# Patient Record
Sex: MALE | Race: OTHER | HISPANIC OR LATINO
[De-identification: names, ages, dates, MRNs, and addresses within clinical notes are randomized per-mention and may not be internally consistent; named-entity substitution may affect disease eponyms.]

---

## 2017-03-07 ENCOUNTER — APPOINTMENT (OUTPATIENT)
Dept: INTERNAL MEDICINE | Facility: CLINIC | Age: 42
End: 2017-03-07

## 2017-03-07 ENCOUNTER — NON-APPOINTMENT (OUTPATIENT)
Age: 42
End: 2017-03-07

## 2017-03-07 VITALS
SYSTOLIC BLOOD PRESSURE: 128 MMHG | HEIGHT: 71 IN | HEART RATE: 104 BPM | WEIGHT: 288 LBS | DIASTOLIC BLOOD PRESSURE: 82 MMHG | BODY MASS INDEX: 40.32 KG/M2 | OXYGEN SATURATION: 98 %

## 2017-03-07 DIAGNOSIS — Z87.891 PERSONAL HISTORY OF NICOTINE DEPENDENCE: ICD-10-CM

## 2017-03-07 DIAGNOSIS — Z78.9 OTHER SPECIFIED HEALTH STATUS: ICD-10-CM

## 2017-03-07 DIAGNOSIS — Z84.1 FAMILY HISTORY OF DISORDERS OF KIDNEY AND URETER: ICD-10-CM

## 2017-03-16 LAB
ALBUMIN SERPL ELPH-MCNC: 4.6 G/DL
ALP BLD-CCNC: 86 U/L
ALT SERPL-CCNC: 26 U/L
ANION GAP SERPL CALC-SCNC: 16 MMOL/L
AST SERPL-CCNC: 22 U/L
BASOPHILS # BLD AUTO: 0.03 K/UL
BASOPHILS NFR BLD AUTO: 0.4 %
BILIRUB SERPL-MCNC: 0.3 MG/DL
BUN SERPL-MCNC: 13 MG/DL
CALCIUM SERPL-MCNC: 9.4 MG/DL
CHLORIDE SERPL-SCNC: 104 MMOL/L
CHOLEST SERPL-MCNC: 271 MG/DL
CHOLEST/HDLC SERPL: 5.9 RATIO
CO2 SERPL-SCNC: 23 MMOL/L
CREAT SERPL-MCNC: 1.21 MG/DL
EOSINOPHIL # BLD AUTO: 0.19 K/UL
EOSINOPHIL NFR BLD AUTO: 2.7 %
GLUCOSE SERPL-MCNC: 104 MG/DL
HBA1C MFR BLD HPLC: 5.5 %
HCT VFR BLD CALC: 48.1 %
HDLC SERPL-MCNC: 46 MG/DL
HGB BLD-MCNC: 16.6 G/DL
HIV1+2 AB SPEC QL IA.RAPID: NONREACTIVE
IMM GRANULOCYTES NFR BLD AUTO: 0.1 %
LDLC SERPL CALC-MCNC: 188 MG/DL
LYMPHOCYTES # BLD AUTO: 3.01 K/UL
LYMPHOCYTES NFR BLD AUTO: 42.6 %
MAN DIFF?: NORMAL
MCHC RBC-ENTMCNC: 29 PG
MCHC RBC-ENTMCNC: 34.5 GM/DL
MCV RBC AUTO: 83.9 FL
MONOCYTES # BLD AUTO: 0.63 K/UL
MONOCYTES NFR BLD AUTO: 8.9 %
NEUTROPHILS # BLD AUTO: 3.2 K/UL
NEUTROPHILS NFR BLD AUTO: 45.3 %
PLATELET # BLD AUTO: 240 K/UL
POTASSIUM SERPL-SCNC: 4.9 MMOL/L
PROT SERPL-MCNC: 7.4 G/DL
RBC # BLD: 5.73 M/UL
RBC # FLD: 13.2 %
SODIUM SERPL-SCNC: 143 MMOL/L
TRIGL SERPL-MCNC: 187 MG/DL
TSH SERPL-ACNC: 2.49 UIU/ML
WBC # FLD AUTO: 7.07 K/UL

## 2017-03-24 ENCOUNTER — OUTPATIENT (OUTPATIENT)
Dept: OUTPATIENT SERVICES | Facility: HOSPITAL | Age: 42
LOS: 1 days | End: 2017-03-24
Payer: COMMERCIAL

## 2017-03-24 ENCOUNTER — APPOINTMENT (OUTPATIENT)
Dept: CV DIAGNOSTICS | Facility: HOSPITAL | Age: 42
End: 2017-03-24

## 2017-03-24 DIAGNOSIS — R07.9 CHEST PAIN, UNSPECIFIED: ICD-10-CM

## 2017-03-24 PROCEDURE — 93017 CV STRESS TEST TRACING ONLY: CPT

## 2017-03-24 PROCEDURE — 93016 CV STRESS TEST SUPVJ ONLY: CPT

## 2017-03-24 PROCEDURE — 93018 CV STRESS TEST I&R ONLY: CPT

## 2019-01-03 ENCOUNTER — APPOINTMENT (OUTPATIENT)
Dept: INTERNAL MEDICINE | Facility: CLINIC | Age: 44
End: 2019-01-03
Payer: COMMERCIAL

## 2019-01-03 ENCOUNTER — FORM ENCOUNTER (OUTPATIENT)
Age: 44
End: 2019-01-03

## 2019-01-03 VITALS
BODY MASS INDEX: 36.26 KG/M2 | SYSTOLIC BLOOD PRESSURE: 130 MMHG | HEART RATE: 79 BPM | OXYGEN SATURATION: 97 % | DIASTOLIC BLOOD PRESSURE: 80 MMHG | TEMPERATURE: 98.8 F | WEIGHT: 259 LBS | HEIGHT: 71 IN

## 2019-01-03 PROCEDURE — 99214 OFFICE O/P EST MOD 30 MIN: CPT

## 2019-01-04 ENCOUNTER — APPOINTMENT (OUTPATIENT)
Dept: ULTRASOUND IMAGING | Facility: CLINIC | Age: 44
End: 2019-01-04
Payer: COMMERCIAL

## 2019-01-04 ENCOUNTER — OTHER (OUTPATIENT)
Age: 44
End: 2019-01-04

## 2019-01-04 ENCOUNTER — OUTPATIENT (OUTPATIENT)
Dept: OUTPATIENT SERVICES | Facility: HOSPITAL | Age: 44
LOS: 1 days | End: 2019-01-04
Payer: COMMERCIAL

## 2019-01-04 DIAGNOSIS — R31.9 HEMATURIA, UNSPECIFIED: ICD-10-CM

## 2019-01-04 LAB
25(OH)D3 SERPL-MCNC: 15.1 NG/ML
ANION GAP SERPL CALC-SCNC: 13 MMOL/L
APPEARANCE: CLEAR
BACTERIA: ABNORMAL
BASOPHILS # BLD AUTO: 0.03 K/UL
BASOPHILS NFR BLD AUTO: 0.4 %
BILIRUBIN URINE: NEGATIVE
BLOOD URINE: ABNORMAL
BUN SERPL-MCNC: 11 MG/DL
C TRACH RRNA SPEC QL NAA+PROBE: NOT DETECTED
CALCIUM SERPL-MCNC: 10.1 MG/DL
CALCIUM SERPL-MCNC: 9.8 MG/DL
CHLORIDE SERPL-SCNC: 105 MMOL/L
CO2 SERPL-SCNC: 23 MMOL/L
COLOR: YELLOW
CREAT SERPL-MCNC: 1.13 MG/DL
EOSINOPHIL # BLD AUTO: 0.14 K/UL
EOSINOPHIL NFR BLD AUTO: 1.8 %
GLUCOSE QUALITATIVE U: NEGATIVE MG/DL
GLUCOSE SERPL-MCNC: 87 MG/DL
HCT VFR BLD CALC: 45.5 %
HGB BLD-MCNC: 16 G/DL
HYALINE CASTS: 2 /LPF
IMM GRANULOCYTES NFR BLD AUTO: 0.5 %
KETONES URINE: NEGATIVE
LEUKOCYTE ESTERASE URINE: ABNORMAL
LYMPHOCYTES # BLD AUTO: 2.6 K/UL
LYMPHOCYTES NFR BLD AUTO: 32.5 %
MAN DIFF?: NORMAL
MCHC RBC-ENTMCNC: 29.2 PG
MCHC RBC-ENTMCNC: 35.2 GM/DL
MCV RBC AUTO: 83 FL
MICROSCOPIC-UA: NORMAL
MONOCYTES # BLD AUTO: 0.63 K/UL
MONOCYTES NFR BLD AUTO: 7.9 %
N GONORRHOEA RRNA SPEC QL NAA+PROBE: NOT DETECTED
NEUTROPHILS # BLD AUTO: 4.56 K/UL
NEUTROPHILS NFR BLD AUTO: 56.9 %
NITRITE URINE: POSITIVE
PARATHYROID HORMONE INTACT: 44 PG/ML
PH URINE: 7
PLATELET # BLD AUTO: 227 K/UL
POTASSIUM SERPL-SCNC: 4.6 MMOL/L
PROTEIN URINE: NEGATIVE MG/DL
RBC # BLD: 5.48 M/UL
RBC # FLD: 13.5 %
RED BLOOD CELLS URINE: 4 /HPF
SODIUM SERPL-SCNC: 141 MMOL/L
SOURCE AMPLIFICATION: NORMAL
SPECIFIC GRAVITY URINE: 1.02
SQUAMOUS EPITHELIAL CELLS: 1 /HPF
UROBILINOGEN URINE: NEGATIVE MG/DL
WBC # FLD AUTO: 8 K/UL
WHITE BLOOD CELLS URINE: 35 /HPF

## 2019-01-04 PROCEDURE — 76770 US EXAM ABDO BACK WALL COMP: CPT | Mod: 26

## 2019-01-04 PROCEDURE — 76770 US EXAM ABDO BACK WALL COMP: CPT

## 2019-01-04 PROCEDURE — 76775 US EXAM ABDO BACK WALL LIM: CPT

## 2019-01-04 PROCEDURE — 76775 US EXAM ABDO BACK WALL LIM: CPT | Mod: 26

## 2019-01-04 NOTE — PHYSICAL EXAM
[No Acute Distress] : no acute distress [Normal Oropharynx] : the oropharynx was normal [Supple] : supple [No Respiratory Distress] : no respiratory distress  [Clear to Auscultation] : lungs were clear to auscultation bilaterally [No Accessory Muscle Use] : no accessory muscle use [Normal Rate] : normal rate  [Regular Rhythm] : with a regular rhythm [Normal S1, S2] : normal S1 and S2 [No Edema] : there was no peripheral edema [Soft] : abdomen soft [Non Tender] : non-tender [No HSM] : no HSM [No CVA Tenderness] : no CVA  tenderness [No Spinal Tenderness] : no spinal tenderness [No Joint Swelling] : no joint swelling [No Rash] : no rash [Normal Gait] : normal gait [Alert and Oriented x3] : oriented to person, place, and time [de-identified] : protuberant [de-identified] : palpable, smooth prostate, brown stool in vault, non tender

## 2019-01-04 NOTE — PLAN
[FreeTextEntry1] : 44 yo man with urinary symptoms and PMH of kidney stones\par \par Treat empiracally for UTI given U/A\par F-up final Cx\par If + will shayy to see Urology\par \par Past Kidney stones\par Sono\par Lytes\par PTH, Vit D\par \par Will return for CPE with PMD

## 2019-01-04 NOTE — HISTORY OF PRESENT ILLNESS
[FreeTextEntry8] : Not seen since 3/2017. Blood in urine, 2 months ago and did not follow-up. For about one week change in urination, change in color, some sediment. No freq, no dysuria. No n/v. Same partner for 7 yrs, woman. Feeling hot but no fever, bodyaches. Mild back pain. Water 50 ounces a day. No pain. Took Aleve and Ibuprofen this week for back pain. \par \par PMH\par obesity\par kidney stones, 10 yrs ago\par 3 children\par \par FH\par sister- kidney stones\par no prostate issues\par \par SH \par \par no recent travel

## 2019-01-07 ENCOUNTER — OTHER (OUTPATIENT)
Age: 44
End: 2019-01-07

## 2019-01-07 LAB — BACTERIA UR CULT: ABNORMAL

## 2019-01-09 ENCOUNTER — APPOINTMENT (OUTPATIENT)
Dept: INTERNAL MEDICINE | Facility: CLINIC | Age: 44
End: 2019-01-09
Payer: COMMERCIAL

## 2019-01-09 VITALS
OXYGEN SATURATION: 98 % | HEIGHT: 71 IN | WEIGHT: 262 LBS | SYSTOLIC BLOOD PRESSURE: 140 MMHG | BODY MASS INDEX: 36.68 KG/M2 | HEART RATE: 75 BPM | DIASTOLIC BLOOD PRESSURE: 85 MMHG

## 2019-01-09 DIAGNOSIS — Z87.898 PERSONAL HISTORY OF OTHER SPECIFIED CONDITIONS: ICD-10-CM

## 2019-01-09 DIAGNOSIS — Z87.448 PERSONAL HISTORY OF OTHER DISEASES OF URINARY SYSTEM: ICD-10-CM

## 2019-01-09 PROCEDURE — 99213 OFFICE O/P EST LOW 20 MIN: CPT

## 2019-01-09 NOTE — ASSESSMENT
[FreeTextEntry1] : \par UTI symptoms resolving, pt feels well with Abx. Advised to complete course and call back if any recurrent symptoms. Return for CPE as he is overdue, repeat labs at next visit. Started on Vit D as well for insufficiency.

## 2019-01-09 NOTE — HISTORY OF PRESENT ILLNESS
[de-identified] : Follow-up of UTI. Saw my colleague last week, found to have Ecoli in UCx. Started on Levaquin and feeling much better now, back to usual self again. Went for renal sono which showed L cyst, otherwise normal. No flank pain or fevers. He reports urine is now back to normal.

## 2019-03-18 ENCOUNTER — APPOINTMENT (OUTPATIENT)
Dept: INTERNAL MEDICINE | Facility: CLINIC | Age: 44
End: 2019-03-18
Payer: COMMERCIAL

## 2019-03-18 PROCEDURE — 99214 OFFICE O/P EST MOD 30 MIN: CPT

## 2019-03-18 NOTE — ASSESSMENT
[FreeTextEntry1] : The patient is a 44-year-old male with history of obesity and vitamin D deficiency who presents with acute back pain\par \par 1.  Acute back pain\par Most likely musculoskeletal\par Continue Advil 2 tabs every 8 hours\par Add Flexeril 10 mg 3 times daily for 1 week 1 to sedation\par If fails to improve call back discussed stretching and ice/warm soaks\par \par #2 obesity\par Counseled on diet and exercise

## 2019-03-18 NOTE — PHYSICAL EXAM
[No Acute Distress] : no acute distress [Well Nourished] : well nourished [Well Developed] : well developed [Well-Appearing] : well-appearing [Normal Voice/Communication] : normal voice/communication [No Respiratory Distress] : no respiratory distress  [Clear to Auscultation] : lungs were clear to auscultation bilaterally [No Accessory Muscle Use] : no accessory muscle use [Normal Rate] : normal rate  [Regular Rhythm] : with a regular rhythm [Normal S1, S2] : normal S1 and S2 [Soft] : abdomen soft [Non Tender] : non-tender [No HSM] : no HSM [No CVA Tenderness] : no CVA  tenderness [No Spinal Tenderness] : no spinal tenderness [de-identified] : Bilateral negative straight leg regular raise, full range of motion of spine without tenderness strength 5 out of 5 in all extremities reflexes intact

## 2019-03-18 NOTE — HISTORY OF PRESENT ILLNESS
[FreeTextEntry8] : \par \par CC: Back pain for 4 days\par \par HPI: The patient is a 44-year-old male with history of obesity and vitamin D deficiency who did heavy lifting 4 days ago and noticed acute onset of severe back pain nonradiating localized to the lumbar section denies weakness, fatigue, rash, changes in bowel or bladder habits

## 2019-03-18 NOTE — REVIEW OF SYSTEMS
[Back Pain] : back pain [Negative] : Neurological [Joint Pain] : no joint pain [Muscle Pain] : no muscle pain [Muscle Weakness] : no muscle weakness [Joint Swelling] : no joint swelling

## 2019-07-09 ENCOUNTER — APPOINTMENT (OUTPATIENT)
Dept: INTERNAL MEDICINE | Facility: CLINIC | Age: 44
End: 2019-07-09
Payer: COMMERCIAL

## 2019-07-09 VITALS
HEART RATE: 90 BPM | HEIGHT: 71 IN | WEIGHT: 253 LBS | SYSTOLIC BLOOD PRESSURE: 110 MMHG | TEMPERATURE: 98.1 F | BODY MASS INDEX: 35.42 KG/M2 | DIASTOLIC BLOOD PRESSURE: 70 MMHG | OXYGEN SATURATION: 98 %

## 2019-07-09 DIAGNOSIS — Z13.39 ENCOUNTER FOR SCREENING EXAM FOR OTHER MENTAL HEALTH AND BEHAVIORAL DISORDERS: ICD-10-CM

## 2019-07-09 DIAGNOSIS — Z13.31 ENCOUNTER FOR SCREENING FOR DEPRESSION: ICD-10-CM

## 2019-07-09 PROCEDURE — 99396 PREV VISIT EST AGE 40-64: CPT

## 2019-07-09 PROCEDURE — G0447 BEHAVIOR COUNSEL OBESITY 15M: CPT

## 2019-07-09 PROCEDURE — G0444 DEPRESSION SCREEN ANNUAL: CPT

## 2019-07-09 PROCEDURE — 99214 OFFICE O/P EST MOD 30 MIN: CPT | Mod: 25

## 2019-07-09 PROCEDURE — G0442 ANNUAL ALCOHOL SCREEN 15 MIN: CPT

## 2019-07-10 PROBLEM — Z13.31 DEPRESSION SCREENING: Status: ACTIVE | Noted: 2019-07-10

## 2019-07-10 PROBLEM — Z13.39 ALCOHOL SCREENING: Status: ACTIVE | Noted: 2019-07-10

## 2019-07-10 RX ORDER — LEVOFLOXACIN 750 MG/1
750 TABLET, FILM COATED ORAL DAILY
Qty: 7 | Refills: 0 | Status: DISCONTINUED | COMMUNITY
Start: 2019-01-04 | End: 2019-07-10

## 2019-07-10 NOTE — PHYSICAL EXAM
[No Acute Distress] : no acute distress [Well Nourished] : well nourished [Well Developed] : well developed [Well-Appearing] : well-appearing [Normal Sclera/Conjunctiva] : normal sclera/conjunctiva [EOMI] : extraocular movements intact [PERRL] : pupils equal round and reactive to light [Normal Outer Ear/Nose] : the outer ears and nose were normal in appearance [Normal Oropharynx] : the oropharynx was normal [Supple] : supple [No Lymphadenopathy] : no lymphadenopathy [No JVD] : no jugular venous distention [No Respiratory Distress] : no respiratory distress  [Thyroid Normal, No Nodules] : the thyroid was normal and there were no nodules present [Clear to Auscultation] : lungs were clear to auscultation bilaterally [Normal Rate] : normal rate  [No Accessory Muscle Use] : no accessory muscle use [Normal S1, S2] : normal S1 and S2 [Regular Rhythm] : with a regular rhythm [No Carotid Bruits] : no carotid bruits [No Abdominal Bruit] : a ~M bruit was not heard ~T in the abdomen [No Murmur] : no murmur heard [No Varicosities] : no varicosities [Pedal Pulses Present] : the pedal pulses are present [No Edema] : there was no peripheral edema [No Palpable Aorta] : no palpable aorta [Soft] : abdomen soft [Non Tender] : non-tender [No Extremity Clubbing/Cyanosis] : no extremity clubbing/cyanosis [Non-distended] : non-distended [No Masses] : no abdominal mass palpated [No HSM] : no HSM [Normal Bowel Sounds] : normal bowel sounds [Normal Posterior Cervical Nodes] : no posterior cervical lymphadenopathy [Testes Mass (___cm)] : there were no testicular masses [Epididymis] : the epididymides were normal [Testes Tenderness] : no tenderness of the testes [No CVA Tenderness] : no CVA  tenderness [Normal Anterior Cervical Nodes] : no anterior cervical lymphadenopathy [No Spinal Tenderness] : no spinal tenderness [No Joint Swelling] : no joint swelling [Grossly Normal Strength/Tone] : grossly normal strength/tone [No Rash] : no rash [Coordination Grossly Intact] : coordination grossly intact [Deep Tendon Reflexes (DTR)] : deep tendon reflexes were 2+ and symmetric [Normal Affect] : the affect was normal [No Focal Deficits] : no focal deficits [Normal Gait] : normal gait [Normal Insight/Judgement] : insight and judgment were intact

## 2019-07-10 NOTE — HISTORY OF PRESENT ILLNESS
[de-identified] : \par Preventive visit: pt is up to date with vaccines; he does not smoke cigarettes and does not misuse alcohol; he feels safe at home and has smoke/CO detectors in the house; he wears seatbelts when in vehicles\par \par Medical Issue 1: Obesity: unstable and worsening with increase in weight; he admits to poor diet and no exercise; he is motivated to lose weight and would like resources to help achieve his weight loss goals; he denies chest pains and shortness of breath\par \par Medical Issue 2: Vitamin D deficiency: unstable and poorly controlled; pt admits to poor compliance with supplementation and is reporting pain in bones and back pain\par \par Medical Issue 3: Acute back pain: new symptom, started 2 months ago while heavy lifting at work; feels pain in cervical spine but without radiation down arms or legs; also denies paresthesias, fevers and chills; taking Tylenol which helps [FreeTextEntry1] : Presents for preventive visit as well as concerns regarding obesity, vitamin D deficiency and acute back pain.

## 2019-07-10 NOTE — HEALTH RISK ASSESSMENT
[] : No [Good] : ~his/her~  mood as  good [No falls in past year] : Patient reported no falls in the past year [No] : No [Language] : denies difficulty with language [Change in mental status noted] : No change in mental status noted [Handling Complex Tasks] : denies difficulty handling complex tasks [Learning/Retaining New Information] : denies difficulty learning/retaining new information [Behavior] : denies difficulty with behavior [Spatial Ability and Orientation] : denies difficulty with spatial ability and orientation [None] : None [Reasoning] : denies difficulty with reasoning [With Family] : lives with family [Employed] : employed [] :  [Sexually Active] : sexually active [Feels Safe at Home] : Feels safe at home [High Risk Behavior] : no high risk behavior [Fully functional (using the telephone, shopping, preparing meals, housekeeping, doing laundry, using] : Fully functional and needs no help or supervision to perform IADLs (using the telephone, shopping, preparing meals, housekeeping, doing laundry, using transportation, managing medications and managing finances) [Fully functional (bathing, dressing, toileting, transferring, walking, feeding)] : Fully functional (bathing, dressing, toileting, transferring, walking, feeding) [Reports normal functional visual acuity (ie: able to read med bottle)] : Reports normal functional visual acuity [Reports changes in hearing] : Reports no changes in hearing [Reports changes in vision] : Reports no changes in vision [Carbon Monoxide Detector] : carbon monoxide detector [Reports changes in dental health] : Reports no changes in dental health [Smoke Detector] : smoke detector [Safety elements used in home] : safety elements used in home [Guns at Home] : no guns at home [Seat Belt] :  uses seat belt [Travel to Developing Areas] : does not  travel to developing areas [Sunscreen] : uses sunscreen [TB Exposure] : is not being exposed to tuberculosis [Caregiver Concerns] : does not have caregiver concerns [Reviewed no changes] : Reviewed no changes [AdvancecareDate] : 07/19

## 2019-07-10 NOTE — ASSESSMENT
[FreeTextEntry1] : \par Preventive visit: pt is up to date with vaccines; praised pt's tobacco-free lifestyle; encouraged use of smoke/CO detectors in the house and use of seatbelts when in vehicles\par \par Medical Issue 1: Obesity counselling: 15 mins, assessed BMI at 30 and above now in obese range; advised weight loss, exercise routine and diet; pt agreed to start exercise program for target weight loss 20 lbs; assisted patient with resources including nutrition referral as needed and arranged for follow-up to monitor weight loss progress over next several months\par \par Medical Issue 2: Vitamin D deficiency: unstable and poorly controlled; pt admits to poor compliance with supplementation and is reporting pain in bones and back pain; check Vit D level via blood draw today\par \par Medical Issue 3: Acute back pain: new symptom, started 2 months ago while heavy lifting at work; suspect MSK etiology, check X-ray to eval for fractures or dislocations\par \par Depression screen performed today, PHQ2=0\par \par Annual alcohol misuse screen, 15 mins, done; negative

## 2019-07-11 ENCOUNTER — FORM ENCOUNTER (OUTPATIENT)
Age: 44
End: 2019-07-11

## 2019-07-12 ENCOUNTER — OUTPATIENT (OUTPATIENT)
Dept: OUTPATIENT SERVICES | Facility: HOSPITAL | Age: 44
LOS: 1 days | End: 2019-07-12
Payer: COMMERCIAL

## 2019-07-12 ENCOUNTER — APPOINTMENT (OUTPATIENT)
Dept: RADIOLOGY | Facility: CLINIC | Age: 44
End: 2019-07-12
Payer: COMMERCIAL

## 2019-07-12 DIAGNOSIS — M54.9 DORSALGIA, UNSPECIFIED: ICD-10-CM

## 2019-07-12 PROCEDURE — 71046 X-RAY EXAM CHEST 2 VIEWS: CPT

## 2019-07-12 PROCEDURE — 71046 X-RAY EXAM CHEST 2 VIEWS: CPT | Mod: 26

## 2019-07-12 PROCEDURE — 72040 X-RAY EXAM NECK SPINE 2-3 VW: CPT | Mod: 26

## 2019-07-12 PROCEDURE — 72040 X-RAY EXAM NECK SPINE 2-3 VW: CPT

## 2019-07-14 LAB
25(OH)D3 SERPL-MCNC: 39.7 NG/ML
ALBUMIN SERPL ELPH-MCNC: 4.9 G/DL
ALP BLD-CCNC: 67 U/L
ALT SERPL-CCNC: 20 U/L
ANION GAP SERPL CALC-SCNC: 18 MMOL/L
AST SERPL-CCNC: 26 U/L
BASOPHILS # BLD AUTO: 0.05 K/UL
BASOPHILS NFR BLD AUTO: 0.6 %
BILIRUB SERPL-MCNC: 0.5 MG/DL
BUN SERPL-MCNC: 14 MG/DL
CALCIUM SERPL-MCNC: 9.8 MG/DL
CHLORIDE SERPL-SCNC: 105 MMOL/L
CHOLEST SERPL-MCNC: 274 MG/DL
CHOLEST/HDLC SERPL: 5.6 RATIO
CO2 SERPL-SCNC: 22 MMOL/L
CREAT SERPL-MCNC: 1.18 MG/DL
EOSINOPHIL # BLD AUTO: 0.06 K/UL
EOSINOPHIL NFR BLD AUTO: 0.7 %
ESTIMATED AVERAGE GLUCOSE: 105 MG/DL
GLUCOSE SERPL-MCNC: 86 MG/DL
HBA1C MFR BLD HPLC: 5.3 %
HCT VFR BLD CALC: 46.5 %
HDLC SERPL-MCNC: 49 MG/DL
HGB BLD-MCNC: 15.6 G/DL
IMM GRANULOCYTES NFR BLD AUTO: 0.1 %
LDLC SERPL CALC-MCNC: 199 MG/DL
LYMPHOCYTES # BLD AUTO: 2.58 K/UL
LYMPHOCYTES NFR BLD AUTO: 31 %
MAN DIFF?: NORMAL
MCHC RBC-ENTMCNC: 29.2 PG
MCHC RBC-ENTMCNC: 33.5 GM/DL
MCV RBC AUTO: 86.9 FL
MONOCYTES # BLD AUTO: 0.54 K/UL
MONOCYTES NFR BLD AUTO: 6.5 %
NEUTROPHILS # BLD AUTO: 5.07 K/UL
NEUTROPHILS NFR BLD AUTO: 61.1 %
PLATELET # BLD AUTO: 245 K/UL
POTASSIUM SERPL-SCNC: 4.8 MMOL/L
PROT SERPL-MCNC: 7.6 G/DL
RBC # BLD: 5.35 M/UL
RBC # FLD: 12.9 %
SODIUM SERPL-SCNC: 145 MMOL/L
TRIGL SERPL-MCNC: 131 MG/DL
TSH SERPL-ACNC: 1.81 UIU/ML
WBC # FLD AUTO: 8.31 K/UL

## 2020-11-23 ENCOUNTER — APPOINTMENT (OUTPATIENT)
Dept: FAMILY MEDICINE | Facility: CLINIC | Age: 45
End: 2020-11-23
Payer: COMMERCIAL

## 2020-11-23 ENCOUNTER — NON-APPOINTMENT (OUTPATIENT)
Age: 45
End: 2020-11-23

## 2020-11-23 VITALS
OXYGEN SATURATION: 97 % | WEIGHT: 267 LBS | BODY MASS INDEX: 37.38 KG/M2 | DIASTOLIC BLOOD PRESSURE: 82 MMHG | HEIGHT: 71 IN | SYSTOLIC BLOOD PRESSURE: 124 MMHG | HEART RATE: 86 BPM | TEMPERATURE: 97.9 F

## 2020-11-23 DIAGNOSIS — Z23 ENCOUNTER FOR IMMUNIZATION: ICD-10-CM

## 2020-11-23 DIAGNOSIS — Z11.59 ENCOUNTER FOR SCREENING FOR OTHER VIRAL DISEASES: ICD-10-CM

## 2020-11-23 DIAGNOSIS — Z11.3 ENCOUNTER FOR SCREENING FOR INFECTIONS WITH A PREDOMINANTLY SEXUAL MODE OF TRANSMISSION: ICD-10-CM

## 2020-11-23 PROCEDURE — 99386 PREV VISIT NEW AGE 40-64: CPT | Mod: 25

## 2020-11-23 PROCEDURE — G0442 ANNUAL ALCOHOL SCREEN 15 MIN: CPT

## 2020-11-23 RX ORDER — ALBUTEROL SULFATE 90 UG/1
108 (90 BASE) AEROSOL, METERED RESPIRATORY (INHALATION) EVERY 6 HOURS
Qty: 1 | Refills: 3 | Status: COMPLETED | COMMUNITY
Start: 2020-04-06 | End: 2020-11-23

## 2020-11-23 RX ORDER — INHALER,ASSIST DEVICE,LG MASK
SPACER (EA) MISCELLANEOUS
Qty: 1 | Refills: 0 | Status: DISCONTINUED | COMMUNITY
Start: 2020-04-06 | End: 2020-11-23

## 2020-11-23 RX ORDER — ERGOCALCIFEROL 1.25 MG/1
1.25 MG CAPSULE ORAL
Qty: 12 | Refills: 1 | Status: COMPLETED | COMMUNITY
Start: 2019-01-04 | End: 2020-11-23

## 2020-11-23 RX ORDER — CYCLOBENZAPRINE HYDROCHLORIDE 10 MG/1
10 TABLET, FILM COATED ORAL 3 TIMES DAILY
Qty: 21 | Refills: 0 | Status: COMPLETED | COMMUNITY
Start: 2019-03-18 | End: 2020-11-23

## 2020-11-23 NOTE — ASSESSMENT
[FreeTextEntry1] : Annual physical: f/u routine labwork\par Screen for STD: f/u labwork\par Low vit D: f/u vit D level\par HLD: encourage diet/wt loss/exercise/low fat diet, f/u lipid panel\par Atypical chest pain: single episode since resolved, constant for 2.5 days unrelated to exertion likely muscular, currently asymptomatic, vital signs stable, no FIELD, EKG shows NSR, will refer to cardio for exercise stress test and possible TTE, advised pt to go to ER if chest pain recurs\par Hx of neck fracture during childhood: pt would like to establish care with orthopedist, will refer to ortho\par RTC 2 weeks

## 2020-11-23 NOTE — COUNSELING
[AUDIT-C Screening administered and reviewed] : AUDIT-C Screening administered and reviewed [Potential consequences of obesity discussed] : Potential consequences of obesity discussed [Benefits of weight loss discussed] : Benefits of weight loss discussed [Encouraged to maintain food diary] : Encouraged to maintain food diary [Encouraged to increase physical activity] : Encouraged to increase physical activity [Encouraged to use exercise tracking device] : Encouraged to use exercise tracking device

## 2020-11-23 NOTE — REVIEW OF SYSTEMS
[Chest Pain] : chest pain [Negative] : Psychiatric [Palpitations] : no palpitations [Claudication] : no  leg claudication [Lower Ext Edema] : no lower extremity edema [Orthopena] : no orthopnea [Paroxysmal Nocturnal Dyspnea] : no paroxysmal nocturnal dyspnea

## 2020-11-23 NOTE — HEALTH RISK ASSESSMENT
[Very Good] : ~his/her~  mood as very good [Yes] : Yes [Monthly or less (1 pt)] : Monthly or less (1 point) [1 or 2 (0 pts)] : 1 or 2 (0 points) [Never (0 pts)] : Never (0 points) [No] : In the past 12 months have you used drugs other than those required for medical reasons? No [No falls in past year] : Patient reported no falls in the past year [0] : 2) Feeling down, depressed, or hopeless: Not at all (0) [HIV Test offered] : HIV Test offered [Hepatitis C test offered] : Hepatitis C test offered [With Family] : lives with family [Employed] : employed [] :  [Sexually Active] : sexually active [Feels Safe at Home] : Feels safe at home [Fully functional (bathing, dressing, toileting, transferring, walking, feeding)] : Fully functional (bathing, dressing, toileting, transferring, walking, feeding) [Fully functional (using the telephone, shopping, preparing meals, housekeeping, doing laundry, using] : Fully functional and needs no help or supervision to perform IADLs (using the telephone, shopping, preparing meals, housekeeping, doing laundry, using transportation, managing medications and managing finances) [] : No [Audit-CScore] : 1 [de-identified] : needs improvement [de-identified] : needs improvement [[id number]] : 0 [High Risk Behavior] : no high risk behavior [Reports changes in hearing] : Reports no changes in hearing [Reports changes in vision] : Reports no changes in vision [Reports changes in dental health] : Reports no changes in dental health

## 2020-11-23 NOTE — HISTORY OF PRESENT ILLNESS
[FreeTextEntry1] : CPE [de-identified] : 46 yo male with pmhx of HLD, low vit D presents for annual physical. pt says that last weekend he had pain in his chest for the first time. It was 7/10 in severity, left side of chest, pressure, nonradiating, constant for 2.5 days and then resolved on its own. nothing made it better or worse. it was not related to exertion. he says it only happened that time. He had episodes of chest pain that were different in 2017 for which he saw a cardiologist and had a negative stress test. Denies fever, chills, cp, palpitations, sob, n/v, heat/cold intolerance, dizziness, diaphoresis, or calf pain.

## 2020-12-03 LAB
25(OH)D3 SERPL-MCNC: 28.6 NG/ML
ALBUMIN SERPL ELPH-MCNC: 4.8 G/DL
ALP BLD-CCNC: 87 U/L
ALT SERPL-CCNC: 22 U/L
ANION GAP SERPL CALC-SCNC: 12 MMOL/L
APPEARANCE: CLEAR
AST SERPL-CCNC: 25 U/L
BACTERIA: ABNORMAL
BASOPHILS # BLD AUTO: 0.05 K/UL
BASOPHILS NFR BLD AUTO: 0.6 %
BILIRUB SERPL-MCNC: 0.5 MG/DL
BILIRUBIN URINE: NEGATIVE
BLOOD URINE: ABNORMAL
BUN SERPL-MCNC: 16 MG/DL
C TRACH RRNA SPEC QL NAA+PROBE: NOT DETECTED
CALCIUM SERPL-MCNC: 9.9 MG/DL
CHLORIDE SERPL-SCNC: 103 MMOL/L
CHOLEST SERPL-MCNC: 302 MG/DL
CO2 SERPL-SCNC: 24 MMOL/L
COLOR: YELLOW
CREAT SERPL-MCNC: 1.06 MG/DL
EOSINOPHIL # BLD AUTO: 0.09 K/UL
EOSINOPHIL NFR BLD AUTO: 1 %
ESTIMATED AVERAGE GLUCOSE: 111 MG/DL
GLUCOSE QUALITATIVE U: NEGATIVE
GLUCOSE SERPL-MCNC: 82 MG/DL
HBA1C MFR BLD HPLC: 5.5 %
HBV CORE IGG+IGM SER QL: REACTIVE
HBV CORE IGM SER QL: NONREACTIVE
HBV SURFACE AB SER QL: REACTIVE
HBV SURFACE AG SER QL: NONREACTIVE
HCT VFR BLD CALC: 47.6 %
HCV AB SER QL: NONREACTIVE
HCV S/CO RATIO: 0.13 S/CO
HDLC SERPL-MCNC: 48 MG/DL
HGB BLD-MCNC: 16 G/DL
HIV1+2 AB SPEC QL IA.RAPID: NONREACTIVE
HYALINE CASTS: 3 /LPF
IMM GRANULOCYTES NFR BLD AUTO: 0.2 %
KETONES URINE: NEGATIVE
LDLC SERPL CALC-MCNC: 203 MG/DL
LEUKOCYTE ESTERASE URINE: ABNORMAL
LYMPHOCYTES # BLD AUTO: 3.15 K/UL
LYMPHOCYTES NFR BLD AUTO: 36.2 %
MAN DIFF?: NORMAL
MCHC RBC-ENTMCNC: 29.3 PG
MCHC RBC-ENTMCNC: 33.6 GM/DL
MCV RBC AUTO: 87 FL
MICROSCOPIC-UA: NORMAL
MONOCYTES # BLD AUTO: 0.46 K/UL
MONOCYTES NFR BLD AUTO: 5.3 %
N GONORRHOEA RRNA SPEC QL NAA+PROBE: NOT DETECTED
NEUTROPHILS # BLD AUTO: 4.94 K/UL
NEUTROPHILS NFR BLD AUTO: 56.7 %
NITRITE URINE: POSITIVE
NONHDLC SERPL-MCNC: 254 MG/DL
PH URINE: 6
PLATELET # BLD AUTO: 251 K/UL
POTASSIUM SERPL-SCNC: 4.6 MMOL/L
PROT SERPL-MCNC: 7.4 G/DL
PROTEIN URINE: NORMAL
RBC # BLD: 5.47 M/UL
RBC # FLD: 13 %
RED BLOOD CELLS URINE: 1 /HPF
SARS-COV-2 IGG SERPL IA-ACNC: 0.09 INDEX
SARS-COV-2 IGG SERPL QL IA: NEGATIVE
SODIUM SERPL-SCNC: 139 MMOL/L
SOURCE AMPLIFICATION: NORMAL
SPECIFIC GRAVITY URINE: 1.03
SQUAMOUS EPITHELIAL CELLS: 1 /HPF
T PALLIDUM AB SER QL IA: NEGATIVE
TRIGL SERPL-MCNC: 254 MG/DL
TSH SERPL-ACNC: 2 UIU/ML
UROBILINOGEN URINE: NORMAL
WBC # FLD AUTO: 8.71 K/UL
WHITE BLOOD CELLS URINE: 39 /HPF

## 2020-12-08 ENCOUNTER — APPOINTMENT (OUTPATIENT)
Dept: CARDIOLOGY | Facility: CLINIC | Age: 45
End: 2020-12-08
Payer: COMMERCIAL

## 2020-12-08 VITALS
OXYGEN SATURATION: 96 % | HEIGHT: 71 IN | SYSTOLIC BLOOD PRESSURE: 130 MMHG | DIASTOLIC BLOOD PRESSURE: 70 MMHG | BODY MASS INDEX: 37.38 KG/M2 | WEIGHT: 267 LBS | HEART RATE: 68 BPM

## 2020-12-08 PROCEDURE — 99072 ADDL SUPL MATRL&STAF TM PHE: CPT

## 2020-12-08 PROCEDURE — 99244 OFF/OP CNSLTJ NEW/EST MOD 40: CPT

## 2020-12-15 ENCOUNTER — APPOINTMENT (OUTPATIENT)
Dept: FAMILY MEDICINE | Facility: CLINIC | Age: 45
End: 2020-12-15
Payer: COMMERCIAL

## 2020-12-15 VITALS
OXYGEN SATURATION: 98 % | DIASTOLIC BLOOD PRESSURE: 78 MMHG | HEIGHT: 71 IN | BODY MASS INDEX: 37.24 KG/M2 | HEART RATE: 65 BPM | WEIGHT: 266 LBS | TEMPERATURE: 97.8 F | SYSTOLIC BLOOD PRESSURE: 122 MMHG

## 2020-12-15 PROCEDURE — 81003 URINALYSIS AUTO W/O SCOPE: CPT | Mod: QW

## 2020-12-15 PROCEDURE — 99213 OFFICE O/P EST LOW 20 MIN: CPT

## 2020-12-15 PROCEDURE — 99072 ADDL SUPL MATRL&STAF TM PHE: CPT

## 2020-12-15 NOTE — HISTORY OF PRESENT ILLNESS
[FreeTextEntry1] : follow up on hematuria  [de-identified] : 44 yo male presents for follow up of hematuria. pt reports that since that visit he has noticed strange smell to his urine, increased frequency. It is associated with mild right flank discomfort x 1 week. He has a history of kidney stones however says this discomfort feels different. Denies fever, chills, cp, palpitations, sob, n/v, heat/cold intolerance, gross hematuria, melena, hematochezia or calf pain.

## 2020-12-15 NOTE — ASSESSMENT
[FreeTextEntry1] : Acute cystitis with hematuria: no cva tenderness, no fever/chills, appears well, f/u UA/UCx, start levaquin 750 mg po qdaily x 7 days\par RTC 2 weeks

## 2020-12-15 NOTE — DISCUSSION/SUMMARY
[FreeTextEntry1] : Pt is a 44 y/o M PMH HLD, BMI 37 with CP, SOB\par Will check transthoracic echocardiogram to evaluate left ventricular function and assess for any structural abnormalities\par check CCTA to eval for CAD\par Advised pt to go to the nearest ED if symptoms persist or worsen\par Pt will return in 4 mnths or sooner as needed but I encouraged communication via phone or portal if necessary. \par The described plan was discussed with the pt.  All questions and concerns were addressed to the best of my knowledge.

## 2020-12-15 NOTE — HISTORY OF PRESENT ILLNESS
[FreeTextEntry1] : Pt is a 44 y/o M who is referred here today by their PCP for evaluation.  Pt has PMH HLD, BMI 37.  About 2.5 weeks ago he started having CP radiating to LUE which lasted about 1.5 days.  Since then he has had minor episodes but not as strong as the first time.   At times he gets SOB along with palpitations.  He denies diaphoresis, dizziness, syncope, LE edema, PND, orthopnea. \par \par PMH: HLD, BMI 37\par Smoking status: never\par social ETOH\par no drug use\par Current exercise: none\par Daily water intake:\par Daily caffeine intake:\par OTC medications: \par Family hx: parents HTN/HLD/DM\par Previous cardiac testing:\par Previous hospitalizations:\par

## 2020-12-17 ENCOUNTER — OUTPATIENT (OUTPATIENT)
Dept: OUTPATIENT SERVICES | Facility: HOSPITAL | Age: 45
LOS: 1 days | End: 2020-12-17
Payer: COMMERCIAL

## 2020-12-17 ENCOUNTER — APPOINTMENT (OUTPATIENT)
Dept: ULTRASOUND IMAGING | Facility: CLINIC | Age: 45
End: 2020-12-17
Payer: COMMERCIAL

## 2020-12-17 DIAGNOSIS — N30.01 ACUTE CYSTITIS WITH HEMATURIA: ICD-10-CM

## 2020-12-17 PROCEDURE — 76775 US EXAM ABDO BACK WALL LIM: CPT

## 2020-12-17 PROCEDURE — 76775 US EXAM ABDO BACK WALL LIM: CPT | Mod: 26

## 2020-12-20 LAB
APPEARANCE: CLEAR
BACTERIA UR CULT: ABNORMAL
BACTERIA: ABNORMAL
BILIRUBIN URINE: NEGATIVE
BLOOD URINE: NEGATIVE
COLOR: YELLOW
GLUCOSE QUALITATIVE U: NEGATIVE
HYALINE CASTS: 0 /LPF
KETONES URINE: NEGATIVE
LEUKOCYTE ESTERASE URINE: NEGATIVE
MICROSCOPIC-UA: NORMAL
NITRITE URINE: NEGATIVE
PH URINE: 5.5
PROTEIN URINE: NORMAL
RED BLOOD CELLS URINE: 0 /HPF
SPECIFIC GRAVITY URINE: 1.02
SQUAMOUS EPITHELIAL CELLS: 0 /HPF
UROBILINOGEN URINE: NORMAL
WHITE BLOOD CELLS URINE: 3 /HPF

## 2021-01-05 ENCOUNTER — APPOINTMENT (OUTPATIENT)
Dept: ORTHOPEDIC SURGERY | Facility: CLINIC | Age: 46
End: 2021-01-05
Payer: COMMERCIAL

## 2021-01-05 VITALS
HEIGHT: 71 IN | HEART RATE: 64 BPM | DIASTOLIC BLOOD PRESSURE: 93 MMHG | SYSTOLIC BLOOD PRESSURE: 137 MMHG | BODY MASS INDEX: 37.1 KG/M2 | WEIGHT: 265 LBS

## 2021-01-05 PROCEDURE — 99205 OFFICE O/P NEW HI 60 MIN: CPT

## 2021-01-05 PROCEDURE — 99072 ADDL SUPL MATRL&STAF TM PHE: CPT

## 2021-01-05 PROCEDURE — 72040 X-RAY EXAM NECK SPINE 2-3 VW: CPT

## 2021-01-05 NOTE — DISCUSSION/SUMMARY
[de-identified] : A cervical MRI has been ordered and is medically necessary due to persistent pain, LE hyperreflexia, LUE radiculopathy, history of an incomplete spinal cord injury, and to further assess if there is cervical myelopathy. MRI will help guide treatment plan, possible surgical intervention vs injection therapy with pain management. The patient will follow up after the MRI results have been obtained. Flexion-Extension images at next clinical visit.\par \par Patient with multiple medical comorbidity increasing the risk of perioperative and postoperative complications as well as diminished spine outcomes as per the current medical literature. These include but are not limited to obesity, anxiety/depression, cardiac illness, kidney disease, peripheral vascular disease, history of cancer, COPD, dysmetabolic syndrome including but not limited to diabetes, hyperlipidemia, hypertension. Patient is being referred back to primary care provider for medical optimization, as well as other appropriate specialists as needed for optimization prior to spine surgery. \par Based upon chronic incomplete spinal cord injury and suspected chronic cervical myelopathy at this patient be a complex patient with regard to recovery.

## 2021-01-05 NOTE — PHYSICAL EXAM
[Obese] : obese [Poor Appearance] : well-appearing [Acute Distress] : not in acute distress [de-identified] : CONSTITUTIONAL: Patient is a very pleasant individual who is well-nourished and appears stated age. \par PSYCHIATRIC: Alert and oriented times three and in no apparent distress, and participates with orthopedic evaluation well.\par HEAD: Atraumatic and nonsyndromic in appearance.\par EENT: No thyromegaly, EOMI.\par RESPIRATORY: Respiratory rate is regular, not dyspneic on examination.\par LYMPHATICS: There is no cervical or axillary lymphadenopathy.\par INTEGUMENTARY: Skin is clean, dry, and intact about the bilateral upper extremities and cervical spine. \par VASCULAR: There is brisk capillary refill about the bilateral upper extremities and radial pulses are 2/4. \par NEUROLOGIC: Positive L'hirmitte, negative Spurling’s sign. Deep tendon reflexes are well-maintained at +2/4 of the bilateral upper extremities and are symmetric. Radiating pain and paresthesia to the LUE, profound LE hyperreflexia and two beats sustained clonus of the BL LE.\par MUSCULOSKELETAL: There is no visible muscular atrophy. The patient ambulates in a non-myelopathic manner. Normal secondary orthopaedic exam of bilateral shoulders, elbows and hands. Elbow flexion and extension, wrist extension, finger flexion and abduction are well maintained.  [de-identified] : Xray of a cervical spine taken 01/05/2021  demonstrates well maintained cervical lordosis with a auto-fusion between C6-C7.  \par

## 2021-01-05 NOTE — ADDENDUM
[FreeTextEntry1] : Documented by Kam Ybarra acting as a scribe for Dr. Rich De La Torre on 01/05/2021. All medical record entries made by the Scribe were at my, Dr. Rich De La Torre, direction and personally dictated by me on 01/05/2021 . I have reviewed the chart and agree that the record accurately reflects my personal performance of the history, physical exam, assessment and plan. I have also personally directed, reviewed, and agreed with the chart.

## 2021-01-05 NOTE — HISTORY OF PRESENT ILLNESS
[de-identified] : 45 year old M presents for an initial evaluation of worsening paresthesia in his LUE  that gets worse when he lays down. He has complaints of  weakness in his left hand as well.  He does not complain of balance issues. JACKLYN questionnaire positive due to this LUE  weakness.  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 This all occurred in the DR. \par \par  [Ataxia] : no ataxia [Incontinence] : no incontinence [Loss of Dexterity] : good dexterity [Urinary Ret.] : no urinary retention

## 2021-01-15 ENCOUNTER — APPOINTMENT (OUTPATIENT)
Dept: MRI IMAGING | Facility: CLINIC | Age: 46
End: 2021-01-15
Payer: COMMERCIAL

## 2021-01-15 ENCOUNTER — OUTPATIENT (OUTPATIENT)
Dept: OUTPATIENT SERVICES | Facility: HOSPITAL | Age: 46
LOS: 1 days | End: 2021-01-15
Payer: COMMERCIAL

## 2021-01-15 DIAGNOSIS — Z00.8 ENCOUNTER FOR OTHER GENERAL EXAMINATION: ICD-10-CM

## 2021-01-15 DIAGNOSIS — R29.2 ABNORMAL REFLEX: ICD-10-CM

## 2021-01-15 PROCEDURE — 72141 MRI NECK SPINE W/O DYE: CPT | Mod: 26

## 2021-01-15 PROCEDURE — 72141 MRI NECK SPINE W/O DYE: CPT

## 2021-01-28 ENCOUNTER — APPOINTMENT (OUTPATIENT)
Dept: ORTHOPEDIC SURGERY | Facility: CLINIC | Age: 46
End: 2021-01-28
Payer: COMMERCIAL

## 2021-01-28 VITALS
DIASTOLIC BLOOD PRESSURE: 82 MMHG | BODY MASS INDEX: 37.1 KG/M2 | HEIGHT: 71 IN | WEIGHT: 265 LBS | HEART RATE: 74 BPM | SYSTOLIC BLOOD PRESSURE: 141 MMHG

## 2021-01-28 DIAGNOSIS — R29.2 ABNORMAL REFLEX: ICD-10-CM

## 2021-01-28 DIAGNOSIS — Z87.440 PERSONAL HISTORY OF URINARY (TRACT) INFECTIONS: ICD-10-CM

## 2021-01-28 DIAGNOSIS — Z87.448 PERSONAL HISTORY OF OTHER DISEASES OF URINARY SYSTEM: ICD-10-CM

## 2021-01-28 DIAGNOSIS — Z83.3 FAMILY HISTORY OF DIABETES MELLITUS: ICD-10-CM

## 2021-01-28 PROCEDURE — 99072 ADDL SUPL MATRL&STAF TM PHE: CPT

## 2021-01-28 PROCEDURE — 99215 OFFICE O/P EST HI 40 MIN: CPT

## 2021-02-03 ENCOUNTER — APPOINTMENT (OUTPATIENT)
Dept: CARDIOLOGY | Facility: CLINIC | Age: 46
End: 2021-02-03
Payer: COMMERCIAL

## 2021-02-03 PROCEDURE — 99072 ADDL SUPL MATRL&STAF TM PHE: CPT

## 2021-02-03 PROCEDURE — 93306 TTE W/DOPPLER COMPLETE: CPT

## 2021-02-05 ENCOUNTER — APPOINTMENT (OUTPATIENT)
Dept: CT IMAGING | Facility: CLINIC | Age: 46
End: 2021-02-05
Payer: COMMERCIAL

## 2021-02-05 ENCOUNTER — NON-APPOINTMENT (OUTPATIENT)
Age: 46
End: 2021-02-05

## 2021-02-05 ENCOUNTER — OUTPATIENT (OUTPATIENT)
Dept: OUTPATIENT SERVICES | Facility: HOSPITAL | Age: 46
LOS: 1 days | End: 2021-02-05
Payer: COMMERCIAL

## 2021-02-05 DIAGNOSIS — Z00.8 ENCOUNTER FOR OTHER GENERAL EXAMINATION: ICD-10-CM

## 2021-02-05 DIAGNOSIS — R29.2 ABNORMAL REFLEX: ICD-10-CM

## 2021-02-05 PROCEDURE — 72125 CT NECK SPINE W/O DYE: CPT

## 2021-02-05 PROCEDURE — 72125 CT NECK SPINE W/O DYE: CPT | Mod: 26

## 2021-02-05 NOTE — HISTORY OF PRESENT ILLNESS
[de-identified] : 46 year old M Presents for follow up evaluation of  worsening paresthesia in his LUE  that gets worse when he lays down. He has complaints of  weakness in his left hand as well. JACKLYN questionnaire positive due to this LUE  weakness. 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 This all occurred in the DR. His gait and strength continue to decline. Currently gainfully employed as a . \par \par  [Ataxia] : no ataxia [Incontinence] : no incontinence [Loss of Dexterity] : good dexterity [Urinary Ret.] : no urinary retention

## 2021-02-05 NOTE — ADDENDUM
[FreeTextEntry1] : Documented by Kam Ybarra acting as a scribe for Dr. Rich De La Torre on 01/28/2021. All medical record entries made by the Scribe were at my, Dr. Rich De La Torre, direction and personally dictated by me on 01/28/2021 . I have reviewed the chart and agree that the record accurately reflects my personal performance of the history, physical exam, assessment and plan. I have also personally directed, reviewed, and agreed with the chart.

## 2021-02-05 NOTE — PHYSICAL EXAM
[Obese] : obese [Poor Appearance] : well-appearing [Acute Distress] : not in acute distress [de-identified] : CONSTITUTIONAL: Patient is a very pleasant individual who is well-nourished and appears stated age. \par PSYCHIATRIC: Alert and oriented times three and in no apparent distress, and participates with orthopedic evaluation well.\par HEAD: Atraumatic and nonsyndromic in appearance.\par EENT: No thyromegaly, EOMI.\par RESPIRATORY: Respiratory rate is regular, not dyspneic on examination.\par LYMPHATICS: There is no cervical or axillary lymphadenopathy.\par INTEGUMENTARY: Skin is clean, dry, and intact about the bilateral upper extremities and cervical spine. \par VASCULAR: There is brisk capillary refill about the bilateral upper extremities and radial pulses are 2/4. \par NEUROLOGIC: Positive L'hirmitte, negative Spurling’s sign. Positive Lan's and Moris signs, with cervical flexion his neurological signs decline, extension improves these complaints.  Deep tendon reflexes are well-maintained at +2/4 of the bilateral upper extremities and are symmetric. Radiating pain and paresthesia to the LUE, profound LE hyperreflexia and two beats sustained clonus of the BL LE.\par MUSCULOSKELETAL: There is no visible muscular atrophy. The patient ambulates in a non-myelopathic manner. Normal secondary orthopaedic exam of bilateral shoulders, elbows and hands. Elbow flexion and extension, wrist extension, finger flexion and abduction are well maintained.  [de-identified] : Xray of a cervical spine taken 01/05/2021  demonstrates well maintained cervical lordosis with a auto-fusion between C6-C7. \par \par MRI of the cervical spine taken at [facility] on 01/15/2021 demonstrates chronic bone deformity at C6 and C7 there is also cord atrophy posteriorly and significant impingement at the C6-C7 junction. \par

## 2021-02-05 NOTE — DISCUSSION/SUMMARY
[de-identified] : A CT scan has been ordered and is medically necessary due to worsening myelopathy, failure of conservative measures, and to assess the size of screws required for surgical intervention. CT scan will help guide treatment plan, possible surgical intervention vs injection therapy with pain management. The patient will follow up after the CT scan results have been obtained. \par \par We clearly discussed disability possibility as he is a  in [facility], due his progressive cervical myelopathy and this surgery being a large reconstruct with a chance of neurological issues, paralysis and inability to return to work due to pain have been discussed. \par \par A long discussion was had with the patient regarding Cervical surgical plan of a posterior and anterior cervical fusion and 2 level corpectomy at C6 and C7 as well as placement of corpectomy cage at C5-T1, posterior cervical laminectomy and fusion C4-T2.  Anatomic models, Xrays, CT scans/MRI’s were utilized to provide a firm understanding of their surgical plan. Patient is aware that surgery is elective in nature and he choosing to proceed with surgery. Risks, benefits, alternatives were discussed and all questions, comments and concerns were encouraged and answered to the patient's satisfaction. The statistical probability of improvement was discussed at length as well as post surgical course. Literature from [employer] society was provided to the patient regarding the specific type of surgery as well as a 5 page written surgical consent which the patient will need to sign and return to the office prior to surgical date. Consent forms highlight specific complications related to the complex nature of spinal surgery.\par  \par Risks of cervical surgery include: dysphagia/difficulty swallowing, Dysphonia/altered voice, adjacent segment disease (which will require more surgery in the future), vascular compromise and stroke, and persistent pain.\par  \par Benefits of cervical surgery include Improved neurologic function and pain score\par  \par We also discussed with the patient complications of incisions directly related to obesity, diabetes, previous wound complications or post-surgical wound infections, smoking, neuropathy, and chronic anticoagulation. This risk has been specifically discussed and the patient will discuss modifiable risk factors to be optimized prior to surgical management. A multimodality approach of primary care physician, and medicine subspecialists will be utilized to optimize medical risk factors.\par  \par If patient is a smoker, discontinuation of smoking was advised and must be accomplished 6-8 weeks prior to surgery date. Patient was advised that help with quitting smoking is available through [facility] Smoker's Quit Line and phone number/website was provided, or patient can ask assistance from primary care provider. Elective surgery will not be performed unless patient complies with this policy. \par \par ADDENDUM 2/5/21 I spoke to patient who's insurance company is denying his decompressive surgery for myelopathy, left upper extremity and lower extremity weakness. We will be doing a peer to peer asap and have let patient know if symptoms progress whatsoever he should go to ED.  I have ordered light physical therapy for upper extremity strengthening, avoid cervical extension,per insurance guidelines which will most certainly not correct spinal cord injury/myelopathy.

## 2021-02-09 ENCOUNTER — APPOINTMENT (OUTPATIENT)
Dept: ORTHOPEDIC SURGERY | Facility: CLINIC | Age: 46
End: 2021-02-09
Payer: COMMERCIAL

## 2021-02-09 VITALS
HEART RATE: 77 BPM | HEIGHT: 71 IN | WEIGHT: 265 LBS | DIASTOLIC BLOOD PRESSURE: 82 MMHG | SYSTOLIC BLOOD PRESSURE: 138 MMHG | BODY MASS INDEX: 37.1 KG/M2

## 2021-02-09 DIAGNOSIS — Z82.49 FAMILY HISTORY OF ISCHEMIC HEART DISEASE AND OTHER DISEASES OF THE CIRCULATORY SYSTEM: ICD-10-CM

## 2021-02-09 PROCEDURE — 99072 ADDL SUPL MATRL&STAF TM PHE: CPT

## 2021-02-09 PROCEDURE — 99215 OFFICE O/P EST HI 40 MIN: CPT

## 2021-02-09 NOTE — ADDENDUM
[FreeTextEntry1] : Documented by Kam Ybarra acting as a scribe for Dr. Rich De La Torre on 02/09/2021. All medical record entries made by the Scribe were at my, Dr. Rich De La Torre, direction and personally dictated by me on 02/09/2021 . I have reviewed the chart and agree that the record accurately reflects my personal performance of the history, physical exam, assessment and plan. I have also personally directed, reviewed, and agreed with the chart.

## 2021-02-09 NOTE — DISCUSSION/SUMMARY
[de-identified] : Patient with multiple medical comorbidity increasing the risk of perioperative and postoperative complications as well as diminished spine outcomes as per the current medical literature.  These include but are not limited to obesity, anxiety/depression, cardiac illness, kidney disease, peripheral vascular disease, history of cancer, COPD, dysmetabolic syndrome including but not limited to diabetes, hyperlipidemia, hypertension.  Patient is being referred back to primary care provider for medical optimization, as well as other appropriate specialists as needed for optimization prior to spine surgery. \par \par Patient is also considered complex due to an incomplete spinal cord injury. Patient is aware of his chronic spinal cord injury prior to reconstruction and that we can not reverse the damage, he is aware this surgery is to attempt to persevere the integrity of his spinal cord after being draped over a kyphotic 35 ° angle. \par \par We clearly discussed disability possibility as he is a  in [facility], due his progressive cervical myelopathy and this surgery being a large reconstruct with a chance of neurological issues, paralysis and inability to return to work due to pain have been discussed. 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 \par \par A long discussion was had with the patient regarding Cervical surgical plan of a posterior and anterior cervical fusion and 2 level corpectomy at C6 and C7 as well as placement of corpectomy cage at C5-T1, posterior cervical laminectomy and fusion C4-T2.  Anatomic models, Xrays, CT scans/MRI’s were utilized to provide a firm understanding of their surgical plan. Patient is aware that surgery is elective in nature and he choosing to proceed with surgery. Risks, benefits, alternatives were discussed and all questions, comments and concerns were encouraged and answered to the patient's satisfaction. The statistical probability of improvement was discussed at length as well as post surgical course. Literature from [employer] was provided to the patient regarding the specific type of surgery as well as a 5 page written surgical consent which the patient will need to sign and return to the office prior to surgical date. Consent forms highlight specific complications related to the complex nature of spinal surgery.\par  \par Risks of cervical surgery include: dysphagia/difficulty swallowing, Dysphonia/altered voice, adjacent segment disease (which will require more surgery in the future), vascular compromise and stroke, and persistent pain.\par  \par Benefits of cervical surgery include Improved neurologic function and pain score\par  \par We also discussed with the patient complications of incisions directly related to obesity, diabetes, previous wound complications or post-surgical wound infections, smoking, neuropathy, and chronic anticoagulation. This risk has been specifically discussed and the patient will discuss modifiable risk factors to be optimized prior to surgical management. A multimodality approach of primary care physician, and medicine subspecialists will be utilized to optimize medical risk factors.\par  \par If patient is a smoker, discontinuation of smoking was advised and must be accomplished 6-8 weeks prior to surgery date. Patient was advised that help with quitting smoking is available through [facility] Smoker's Quit Line and phone number/website was provided, or patient can ask assistance from primary care provider. Elective surgery will not be performed unless patient complies with this policy. \par \par ADDENDUM 2/5/21 I spoke to patient who's insurance company is denying his decompressive surgery for myelopathy, left upper extremity and lower extremity weakness. We will be doing a peer to peer asap and have let patient know if symptoms progress whatsoever he should go to ED.  I have ordered light physical therapy for upper extremity strengthening, avoid cervical extension,per insurance guidelines which will most certainly not correct spinal cord injury/myelopathy.

## 2021-02-09 NOTE — PHYSICAL EXAM
[Obese] : obese [Poor Appearance] : well-appearing [Acute Distress] : not in acute distress [de-identified] : CONSTITUTIONAL: Patient is a very pleasant individual who is well-nourished and appears stated age. \par PSYCHIATRIC: Alert and oriented times three and in no apparent distress, and participates with orthopedic evaluation well.\par HEAD: Atraumatic and nonsyndromic in appearance.\par EENT: No thyromegaly, EOMI.\par RESPIRATORY: Respiratory rate is regular, not dyspneic on examination.\par LYMPHATICS: There is no cervical or axillary lymphadenopathy.\par INTEGUMENTARY: Skin is clean, dry, and intact about the bilateral upper extremities and cervical spine. \par VASCULAR: There is brisk capillary refill about the bilateral upper extremities and radial pulses are 2/4. \par NEUROLOGIC: Positive L'hirmitte, negative Spurling’s sign. Positive Lan's and Moris signs, with cervical flexion his neurological signs decline, extension improves these complaints.  Deep tendon reflexes are well-maintained at +2/4 of the bilateral upper extremities and are symmetric. Radiating pain and paresthesia to the LUE, profound LE hyperreflexia and two beats sustained clonus of the BL LE.\par MUSCULOSKELETAL: There is no visible muscular atrophy. The patient ambulates in a non-myelopathic manner. Normal secondary orthopaedic exam of bilateral shoulders, elbows and hands. Elbow flexion and extension, wrist extension, finger flexion and abduction are well maintained.  [de-identified] : CAT scan of the cervical spine taken at [facility] on 01- demonstrates a deformity at C6-C7, there is a focal kyphotic deformity measuring 35 °. \par \par Previous MRI of the cervical spine demonstrates cord atrophy. and significant draping of the cord over the fx deformity

## 2021-02-16 ENCOUNTER — OUTPATIENT (OUTPATIENT)
Dept: OUTPATIENT SERVICES | Facility: HOSPITAL | Age: 46
LOS: 1 days | End: 2021-02-16
Payer: COMMERCIAL

## 2021-02-16 ENCOUNTER — TRANSCRIPTION ENCOUNTER (OUTPATIENT)
Age: 46
End: 2021-02-16

## 2021-02-16 VITALS
RESPIRATION RATE: 18 BRPM | TEMPERATURE: 97 F | HEART RATE: 80 BPM | SYSTOLIC BLOOD PRESSURE: 100 MMHG | HEIGHT: 71 IN | WEIGHT: 273.37 LBS | DIASTOLIC BLOOD PRESSURE: 66 MMHG

## 2021-02-16 DIAGNOSIS — Z01.818 ENCOUNTER FOR OTHER PREPROCEDURAL EXAMINATION: ICD-10-CM

## 2021-02-16 DIAGNOSIS — G95.9 DISEASE OF SPINAL CORD, UNSPECIFIED: ICD-10-CM

## 2021-02-16 DIAGNOSIS — Z87.81 PERSONAL HISTORY OF (HEALED) TRAUMATIC FRACTURE: Chronic | ICD-10-CM

## 2021-02-16 DIAGNOSIS — Z29.9 ENCOUNTER FOR PROPHYLACTIC MEASURES, UNSPECIFIED: ICD-10-CM

## 2021-02-16 DIAGNOSIS — Z71.89 OTHER SPECIFIED COUNSELING: ICD-10-CM

## 2021-02-16 LAB
A1C WITH ESTIMATED AVERAGE GLUCOSE RESULT: 5.3 % — SIGNIFICANT CHANGE UP (ref 4–5.6)
ALBUMIN SERPL ELPH-MCNC: 4.7 G/DL — SIGNIFICANT CHANGE UP (ref 3.3–5.2)
ALP SERPL-CCNC: 93 U/L — SIGNIFICANT CHANGE UP (ref 40–120)
ALT FLD-CCNC: 34 U/L — SIGNIFICANT CHANGE UP
ANION GAP SERPL CALC-SCNC: 11 MMOL/L — SIGNIFICANT CHANGE UP (ref 5–17)
APTT BLD: 32 SEC — SIGNIFICANT CHANGE UP (ref 27.5–35.5)
AST SERPL-CCNC: 28 U/L — SIGNIFICANT CHANGE UP
BASOPHILS # BLD AUTO: 0.05 K/UL — SIGNIFICANT CHANGE UP (ref 0–0.2)
BASOPHILS NFR BLD AUTO: 0.7 % — SIGNIFICANT CHANGE UP (ref 0–2)
BILIRUB SERPL-MCNC: 0.4 MG/DL — SIGNIFICANT CHANGE UP (ref 0.4–2)
BLD GP AB SCN SERPL QL: SIGNIFICANT CHANGE UP
BUN SERPL-MCNC: 16 MG/DL — SIGNIFICANT CHANGE UP (ref 8–20)
CALCIUM SERPL-MCNC: 9.4 MG/DL — SIGNIFICANT CHANGE UP (ref 8.6–10.2)
CHLORIDE SERPL-SCNC: 101 MMOL/L — SIGNIFICANT CHANGE UP (ref 98–107)
CO2 SERPL-SCNC: 25 MMOL/L — SIGNIFICANT CHANGE UP (ref 22–29)
CREAT SERPL-MCNC: 0.9 MG/DL — SIGNIFICANT CHANGE UP (ref 0.5–1.3)
EOSINOPHIL # BLD AUTO: 0.13 K/UL — SIGNIFICANT CHANGE UP (ref 0–0.5)
EOSINOPHIL NFR BLD AUTO: 1.8 % — SIGNIFICANT CHANGE UP (ref 0–6)
ESTIMATED AVERAGE GLUCOSE: 105 MG/DL — SIGNIFICANT CHANGE UP (ref 68–114)
GLUCOSE SERPL-MCNC: 87 MG/DL — SIGNIFICANT CHANGE UP (ref 70–99)
HCT VFR BLD CALC: 49.3 % — SIGNIFICANT CHANGE UP (ref 39–50)
HGB BLD-MCNC: 16.8 G/DL — SIGNIFICANT CHANGE UP (ref 13–17)
IMM GRANULOCYTES NFR BLD AUTO: 0.3 % — SIGNIFICANT CHANGE UP (ref 0–1.5)
INR BLD: 1.05 RATIO — SIGNIFICANT CHANGE UP (ref 0.88–1.16)
LYMPHOCYTES # BLD AUTO: 2.75 K/UL — SIGNIFICANT CHANGE UP (ref 1–3.3)
LYMPHOCYTES # BLD AUTO: 38.5 % — SIGNIFICANT CHANGE UP (ref 13–44)
MCHC RBC-ENTMCNC: 28.9 PG — SIGNIFICANT CHANGE UP (ref 27–34)
MCHC RBC-ENTMCNC: 34.1 GM/DL — SIGNIFICANT CHANGE UP (ref 32–36)
MCV RBC AUTO: 84.9 FL — SIGNIFICANT CHANGE UP (ref 80–100)
MONOCYTES # BLD AUTO: 0.56 K/UL — SIGNIFICANT CHANGE UP (ref 0–0.9)
MONOCYTES NFR BLD AUTO: 7.8 % — SIGNIFICANT CHANGE UP (ref 2–14)
MRSA PCR RESULT.: SIGNIFICANT CHANGE UP
NEUTROPHILS # BLD AUTO: 3.63 K/UL — SIGNIFICANT CHANGE UP (ref 1.8–7.4)
NEUTROPHILS NFR BLD AUTO: 50.9 % — SIGNIFICANT CHANGE UP (ref 43–77)
PLATELET # BLD AUTO: 222 K/UL — SIGNIFICANT CHANGE UP (ref 150–400)
POTASSIUM SERPL-MCNC: 4.4 MMOL/L — SIGNIFICANT CHANGE UP (ref 3.5–5.3)
POTASSIUM SERPL-SCNC: 4.4 MMOL/L — SIGNIFICANT CHANGE UP (ref 3.5–5.3)
PROT SERPL-MCNC: 7.6 G/DL — SIGNIFICANT CHANGE UP (ref 6.6–8.7)
PROTHROM AB SERPL-ACNC: 12.1 SEC — SIGNIFICANT CHANGE UP (ref 10.6–13.6)
RBC # BLD: 5.81 M/UL — HIGH (ref 4.2–5.8)
RBC # FLD: 12.5 % — SIGNIFICANT CHANGE UP (ref 10.3–14.5)
S AUREUS DNA NOSE QL NAA+PROBE: SIGNIFICANT CHANGE UP
SODIUM SERPL-SCNC: 136 MMOL/L — SIGNIFICANT CHANGE UP (ref 135–145)
WBC # BLD: 7.14 K/UL — SIGNIFICANT CHANGE UP (ref 3.8–10.5)
WBC # FLD AUTO: 7.14 K/UL — SIGNIFICANT CHANGE UP (ref 3.8–10.5)

## 2021-02-16 PROCEDURE — G0463: CPT

## 2021-02-16 PROCEDURE — 93010 ELECTROCARDIOGRAM REPORT: CPT

## 2021-02-16 PROCEDURE — 93005 ELECTROCARDIOGRAM TRACING: CPT

## 2021-02-16 NOTE — H&P PST ADULT - RADIOLOGY RESULTS AND INTERPRETATION
IMPRESSION:  1. Chronic wedge deformity of C6 and C7 is again seen with osseous fusion of the vertebral bodies and facet joints, consistent with the past history of trauma. This results in focal kyphosis and splaying of the spinous processes.  2. Mild central canal stenosis is present at C6-C7 and suggested at C7-T1.  3. Mild right C6-C7 neural foraminal stenosis is present and there is moderate to severe left C7-T1 neural foraminal stenosis.        IMPRESSION:  1. Chronic wedge deformity of C6 and C7 is again seen with osseous fusion of the vertebral bodies and facet joints, consistent with the past history of trauma. This results in focal kyphosis and splaying of the spinous processes.  2. Mild central canal stenosis is present at C6-C7 and suggested at C7-T1.  3. Mild right C6-C7 neural foraminal stenosis is present and there is moderate to severe left C7-T1 neural foraminal stenosis.

## 2021-02-16 NOTE — H&P PST ADULT - NSANTHOSAYNRD_GEN_A_CORE
No. JIA screening performed.  STOP BANG Legend: 0-2 = LOW Risk; 3-4 = INTERMEDIATE Risk; 5-8 = HIGH Risk

## 2021-02-16 NOTE — H&P PST ADULT - ASSESSMENT
OPIOID RISK TOOL    RENETTA EACH BOX THAT APPLIES AND ADD TOTALS AT THE END    FAMILY HISTORY OF SUBSTANCE ABUSE                 FEMALE         MALE                                                Alcohol                             [  ]1 pt          [  ]3pts                                               Illegal Drugs                     [  ]2 pts        [  ]3pts                                               Rx Drugs                           [  ]4 pts        [  ]4 pts    PERSONAL HISTORY OF SUBSTANCE ABUSE                                                                                          Alcohol                             [  ]3 pts       [  ]3 pts                                               Illegal Drugs                     [  ]4 pts        [  ]4 pts                                               Rx Drugs                           [  ]5 pts        [  ]5 pts    AGE BETWEEN 16-45 YEARS                                      [  ]1 pt         [  ]1 pt    HISTORY OF PREADOLESCENT   SEXUAL ABUSE                                                             [  ]3 pts        [  ]0pts    PSYCHOLOGICAL DISEASE                     ADD, OCD, Bipolar, Schizophrenia        [  ]2 pts         [  ]2 pts                      Depression                                               [  ]1 pt           [  ]1 pt           SCORING TOTAL   (add numbers and type here)              (**0*)                                     A score of 3 or lower indicated LOW risk for future opioid abuse  A score of 4 to 7 indicated moderate risk for future opioid abuse  A score of 8 or higher indicates a high risk for opioid abuse    CAPRINI SCORE [CLOT]    AGE RELATED RISK FACTORS                                                       MOBILITY RELATED FACTORS  [ x] Age 41-60 years                                            (1 Point)                  [ ] Bed rest                                                        (1 Point)  [ ] Age: 61-74 years                                           (2 Points)                 [ ] Plaster cast                                                   (2 Points)  [ ] Age= 75 years                                              (3 Points)                 [ ] Bed bound for more than 72 hours                 (2 Points)    DISEASE RELATED RISK FACTORS                                               GENDER SPECIFIC FACTORS  [ ] Edema in the lower extremities                       (1 Point)                  [ ] Pregnancy                                                     (1 Point)  [ ] Varicose veins                                               (1 Point)                  [ ] Post-partum < 6 weeks                                   (1 Point)             [ x] BMI > 25 Kg/m2                                            (1 Point)                  [ ] Hormonal therapy  or oral contraception          (1 Point)                 [ ] Sepsis (in the previous month)                        (1 Point)                  [ ] History of pregnancy complications                 (1 point)  [ ] Pneumonia or serious lung disease                                               [ ] Unexplained or recurrent                     (1 Point)           (in the previous month)                               (1 Point)  [ ] Abnormal pulmonary function test                     (1 Point)                 SURGERY RELATED RISK FACTORS  [ ] Acute myocardial infarction                              (1 Point)                 [ ]  Section                                             (1 Point)  [ ] Congestive heart failure (in the previous month)  (1 Point)               [ ] Minor surgery                                                  (1 Point)   [ ] Inflammatory bowel disease                             (1 Point)                 [ ] Arthroscopic surgery                                        (2 Points)  [ ] Central venous access                                      (2 Points)                [ x] General surgery lasting more than 45 minutes   (2 Points)       [ ] Stroke (in the previous month)                          (5 Points)               [ ] Elective arthroplasty                                         (5 Points)                                                                                                                                               HEMATOLOGY RELATED FACTORS                                                 TRAUMA RELATED RISK FACTORS  [ ] Prior episodes of VTE                                     (3 Points)                [ ] Fracture of the hip, pelvis, or leg                       (5 Points)  [ ] Positive family history for VTE                         (3 Points)                 [ ] Acute spinal cord injury (in the previous month)  (5 Points)  [ ] Prothrombin 69752 A                                     (3 Points)                 [ ] Paralysis  (less than 1 month)                             (5 Points)  [ ] Factor V Leiden                                             (3 Points)                  [ ] Multiple Trauma within 1 month                        (5 Points)  [ ] Lupus anticoagulants                                     (3 Points)                                                           [ ] Anticardiolipin antibodies                               (3 Points)                                                       [ ] High homocysteine in the blood                      (3 Points)                                             [ ] Other congenital or acquired thrombophilia      (3 Points)                                                [ ] Heparin induced thrombocytopenia                  (3 Points)                                          Total Score [    4      ]    Caprini Score 0 - 2:  Low Risk, No VTE Prophylaxis required for most patients, encourage ambulation  Caprini Score 3 - 6:  At Risk, pharmacologic VTE prophylaxis is indicated for most patients (in the absence of a contraindication)  Caprini Score Greater than or = 7:  High Risk, pharmacologic VTE prophylaxis is indicated for most patients (in the absence of a contraindication)      Patient is a 47 y/o male aox3 . Patient c/o neck and back pain with numbness for many years which has worsen  in the last 6 months . Patient states when he was 15 he lived [place] the [place] and was practicing gymnastics while doing a triple front flip he fell on his head causing a cervical fracture C6,C7, due to insurance issue he was unable to get have a repair  . Present day Patient c/o numbness and tingling to his left arm and left leg . Patient pian level ranges from 4/10 to 7/10 with no real relief from pain form rest or medication . Patient went to his PCP and was referred to Dr De La Torre , patient was sent for a MRI as per patient his finding of a "incomplete cervical fracture' . Patient presents to [facility] for evaluation for a corpectomy C6,C7, corpectomy cage and plate C5-C6,C6-C7,C7-T1 on 3/8/21  Patient was educated on preoperative preparation with written and verbal instruction . Patient is going for medical clearance with DR Graham [phone].  Patient will review medications with PCP. Patient was educated on aspirin and aspirin products NSAIDS ,vitamins and herbals that increase the risk of bleeding and need to be stopped five days before procedure  . Patient was also educated on covid testing and covid prevention ,social distancing and wearing a mask.    OPIOID RISK TOOL    RENETTA EACH BOX THAT APPLIES AND ADD TOTALS AT THE END    FAMILY HISTORY OF SUBSTANCE ABUSE                 FEMALE         MALE                                                Alcohol                             [  ]1 pt          [  ]3pts                                               Illegal Drugs                     [  ]2 pts        [  ]3pts                                               Rx Drugs                           [  ]4 pts        [  ]4 pts    PERSONAL HISTORY OF SUBSTANCE ABUSE                                                                                          Alcohol                             [  ]3 pts       [  ]3 pts                                               Illegal Drugs                     [  ]4 pts        [  ]4 pts                                               Rx Drugs                           [  ]5 pts        [  ]5 pts    AGE BETWEEN 16-45 YEARS                                      [  ]1 pt         [  ]1 pt    HISTORY OF PREADOLESCENT   SEXUAL ABUSE                                                             [  ]3 pts        [  ]0pts    PSYCHOLOGICAL DISEASE                     ADD, OCD, Bipolar, Schizophrenia        [  ]2 pts         [  ]2 pts                      Depression                                               [  ]1 pt           [  ]1 pt           SCORING TOTAL   (add numbers and type here)              (**0*)                                     A score of 3 or lower indicated LOW risk for future opioid abuse  A score of 4 to 7 indicated moderate risk for future opioid abuse  A score of 8 or higher indicates a high risk for opioid abuse    CAPRINI SCORE [CLOT]    AGE RELATED RISK FACTORS                                                       MOBILITY RELATED FACTORS  [ x] Age 41-60 years                                            (1 Point)                  [ ] Bed rest                                                        (1 Point)  [ ] Age: 61-74 years                                           (2 Points)                 [ ] Plaster cast                                                   (2 Points)  [ ] Age= 75 years                                              (3 Points)                 [ ] Bed bound for more than 72 hours                 (2 Points)    DISEASE RELATED RISK FACTORS                                               GENDER SPECIFIC FACTORS  [ ] Edema in the lower extremities                       (1 Point)                  [ ] Pregnancy                                                     (1 Point)  [ ] Varicose veins                                               (1 Point)                  [ ] Post-partum < 6 weeks                                   (1 Point)             [ x] BMI > 25 Kg/m2                                            (1 Point)                  [ ] Hormonal therapy  or oral contraception          (1 Point)                 [ ] Sepsis (in the previous month)                        (1 Point)                  [ ] History of pregnancy complications                 (1 point)  [ ] Pneumonia or serious lung disease                                               [ ] Unexplained or recurrent                     (1 Point)           (in the previous month)                               (1 Point)  [ ] Abnormal pulmonary function test                     (1 Point)                 SURGERY RELATED RISK FACTORS  [ ] Acute myocardial infarction                              (1 Point)                 [ ]  Section                                             (1 Point)  [ ] Congestive heart failure (in the previous month)  (1 Point)               [ ] Minor surgery                                                  (1 Point)   [ ] Inflammatory bowel disease                             (1 Point)                 [ ] Arthroscopic surgery                                        (2 Points)  [ ] Central venous access                                      (2 Points)                [ x] General surgery lasting more than 45 minutes   (2 Points)       [ ] Stroke (in the previous month)                          (5 Points)               [ ] Elective arthroplasty                                         (5 Points)                                                                                                                                               HEMATOLOGY RELATED FACTORS                                                 TRAUMA RELATED RISK FACTORS  [ ] Prior episodes of VTE                                     (3 Points)                [ ] Fracture of the hip, pelvis, or leg                       (5 Points)  [ ] Positive family history for VTE                         (3 Points)                 [ ] Acute spinal cord injury (in the previous month)  (5 Points)  [ ] Prothrombin 95104 A                                     (3 Points)                 [ ] Paralysis  (less than 1 month)                             (5 Points)  [ ] Factor V Leiden                                             (3 Points)                  [ ] Multiple Trauma within 1 month                        (5 Points)  [ ] Lupus anticoagulants                                     (3 Points)                                                           [ ] Anticardiolipin antibodies                               (3 Points)                                                       [ ] High homocysteine in the blood                      (3 Points)                                             [ ] Other congenital or acquired thrombophilia      (3 Points)                                                [ ] Heparin induced thrombocytopenia                  (3 Points)                                          Total Score [    4      ]    Caprini Score 0 - 2:  Low Risk, No VTE Prophylaxis required for most patients, encourage ambulation  Caprini Score 3 - 6:  At Risk, pharmacologic VTE prophylaxis is indicated for most patients (in the absence of a contraindication)  Caprini Score Greater than or = 7:  High Risk, pharmacologic VTE prophylaxis is indicated for most patients (in the absence of a contraindication)  Patient is a 47 y/o male aox3 . Patient c/o neck and back pain with numbness for many years patient states has worsen in the last 6 months .Patient states when he was15 he lived [place] and was [place]. While he was doing a triple front flip he fell on his head causing a cervical fracture C6,C7,. Patient states due to insurance issue he was unable to get have a repair . Present day, Patient c/o numbness and tingling to his left arm and left leg . Patient pain level ranges from 4/10 to 7/10 with no real relief from pain form rest or with medication . Patient went to his PCP and was referred to Dr De La Torre , patient was sent for a MRI as per patient his finding of a incomplete cervical fracture . Patient presents to [facility] for evaluation for a corpectomy C6,C7, corpectomy cage and plate C5-C6,C6-C7,C7-T1 on 3/8/21    Patient was educated on preoperative preparation with written and verbal instruction . Patient is going for medical clearance with DR Graham [phone].  Patient will review medications with PCP. Patient was educated on aspirin and aspirin products NSAIDS ,vitamins and herbals that increase the risk of bleeding and need to be stopped five days before procedure  . Patient was also educated on covid testing and covid prevention ,social distancing and wearing a mask.    OPIOID RISK TOOL    RENETTA EACH BOX THAT APPLIES AND ADD TOTALS AT THE END    FAMILY HISTORY OF SUBSTANCE ABUSE                 FEMALE         MALE                                                Alcohol                             [  ]1 pt          [  ]3pts                                               Illegal Drugs                     [  ]2 pts        [  ]3pts                                               Rx Drugs                           [  ]4 pts        [  ]4 pts    PERSONAL HISTORY OF SUBSTANCE ABUSE                                                                                          Alcohol                             [  ]3 pts       [  ]3 pts                                               Illegal Drugs                     [  ]4 pts        [  ]4 pts                                               Rx Drugs                           [  ]5 pts        [  ]5 pts    AGE BETWEEN 16-45 YEARS                                      [  ]1 pt         [  ]1 pt    HISTORY OF PREADOLESCENT   SEXUAL ABUSE                                                             [  ]3 pts        [  ]0pts    PSYCHOLOGICAL DISEASE                     ADD, OCD, Bipolar, Schizophrenia        [  ]2 pts         [  ]2 pts                      Depression                                               [  ]1 pt           [  ]1 pt           SCORING TOTAL   (add numbers and type here)              (**0*)                                     A score of 3 or lower indicated LOW risk for future opioid abuse  A score of 4 to 7 indicated moderate risk for future opioid abuse  A score of 8 or higher indicates a high risk for opioid abuse    CAPRINI SCORE [CLOT]    AGE RELATED RISK FACTORS                                                       MOBILITY RELATED FACTORS  [ x] Age 41-60 years                                            (1 Point)                  [ ] Bed rest                                                        (1 Point)  [ ] Age: 61-74 years                                           (2 Points)                 [ ] Plaster cast                                                   (2 Points)  [ ] Age= 75 years                                              (3 Points)                 [ ] Bed bound for more than 72 hours                 (2 Points)    DISEASE RELATED RISK FACTORS                                               GENDER SPECIFIC FACTORS  [ ] Edema in the lower extremities                       (1 Point)                  [ ] Pregnancy                                                     (1 Point)  [ ] Varicose veins                                               (1 Point)                  [ ] Post-partum < 6 weeks                                   (1 Point)             [ x] BMI > 25 Kg/m2                                            (1 Point)                  [ ] Hormonal therapy  or oral contraception          (1 Point)                 [ ] Sepsis (in the previous month)                        (1 Point)                  [ ] History of pregnancy complications                 (1 point)  [ ] Pneumonia or serious lung disease                                               [ ] Unexplained or recurrent                     (1 Point)           (in the previous month)                               (1 Point)  [ ] Abnormal pulmonary function test                     (1 Point)                 SURGERY RELATED RISK FACTORS  [ ] Acute myocardial infarction                              (1 Point)                 [ ]  Section                                             (1 Point)  [ ] Congestive heart failure (in the previous month)  (1 Point)               [ ] Minor surgery                                                  (1 Point)   [ ] Inflammatory bowel disease                             (1 Point)                 [ ] Arthroscopic surgery                                        (2 Points)  [ ] Central venous access                                      (2 Points)                [ x] General surgery lasting more than 45 minutes   (2 Points)       [ ] Stroke (in the previous month)                          (5 Points)               [ ] Elective arthroplasty                                         (5 Points)                                                                                                                                               HEMATOLOGY RELATED FACTORS                                                 TRAUMA RELATED RISK FACTORS  [ ] Prior episodes of VTE                                     (3 Points)                [ ] Fracture of the hip, pelvis, or leg                       (5 Points)  [ ] Positive family history for VTE                         (3 Points)                 [ ] Acute spinal cord injury (in the previous month)  (5 Points)  [ ] Prothrombin 19179 A                                     (3 Points)                 [ ] Paralysis  (less than 1 month)                             (5 Points)  [ ] Factor V Leiden                                             (3 Points)                  [ ] Multiple Trauma within 1 month                        (5 Points)  [ ] Lupus anticoagulants                                     (3 Points)                                                           [ ] Anticardiolipin antibodies                               (3 Points)                                                       [ ] High homocysteine in the blood                      (3 Points)                                             [ ] Other congenital or acquired thrombophilia      (3 Points)                                                [ ] Heparin induced thrombocytopenia                  (3 Points)                                          Total Score [    4      ]    Caprini Score 0 - 2:  Low Risk, No VTE Prophylaxis required for most patients, encourage ambulation  Caprini Score 3 - 6:  At Risk, pharmacologic VTE prophylaxis is indicated for most patients (in the absence of a contraindication)  Caprini Score Greater than or = 7:  High Risk, pharmacologic VTE prophylaxis is indicated for most patients (in the absence of a contraindication)  Patient is a 47 y/o male aox3 . Patient c/o neck and back pain with numbness for many years patient states has worsen in the last 6 months .Patient states when he was15 he lived [place] the [place] and was [place] gymnastics. While he was doing a triple front flip he fell on his head causing a cervical fracture C6,C7,. Patient states due to insurance issue he was unable to have a repair . Present day, Patient c/o numbness and tingling to his left arm and left leg . Patient pain level ranges from 4/10 to 7/10 with no real relief from pain form rest or with medication . Patient went to his PCP and was referred to Dr De La Torre , patient was sent for a MRI as per patient his finding of a incomplete cervical fracture . Patient presents to [facility] for evaluation for a corpectomy C6,C7, corpectomy cage and plate C5-C6,C6-C7,C7-T1 on 3/8/21    Patient was educated on preoperative preparation with written and verbal instruction . Patient is going for medical clearance with DR Graham [phone].  Patient will review medications with PCP. Patient was educated on aspirin and aspirin products NSAIDS ,vitamins and herbals that increase the risk of bleeding and need to be stopped five days before procedure  . Patient was also educated on covid testing and covid prevention ,social distancing and wearing a mask.

## 2021-02-16 NOTE — H&P PST ADULT - HISTORY OF PRESENT ILLNESS
History of Present Illness   45 year old M presents for an initial evaluation of worsening paresthesia in his LUE that gets worse when he lays down. He has complaints of  weakness in his left hand as well. He does not complain of balance issues. JACKLYN questionnaire positive due to this LUE  weakness. 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 This all occurred in the       (pre op dx: disease if the spinal cord )  Patient is a 47 y/o male aox3 . Patient c/o neck and back pain with numbness for many years which has worsen  in the last 6 months . Patient states when he was 15 he lived [place] and was practicing gymnastics when doing a triple front flip he fell on his head causing a cervical fracture C6,C7, due to insurance issue he was unable to get it fixed . Patient c/o numbness and tingling to his left arm and left leg . Patient pian level ranges from 4/10 to 7/10 with no real relief from pain form rest or medication . Patient went to his PCP and was referred to Dr De La Torre , patient was sent for a MRI as per patient his finding of a incomplete cervical fracture . Patient presents to PST for evaluation for a  corpectomy C6,C7, corpectomy cage and plate C5-C6,C6-C7,C7-T1 on 3/8/21     (pre op dx: disease if the spinal cord )  Patient is a 47 y/o male aox3 . Patient c/o neck and back pain with numbness for many years which has worsen  in the last 6 months . Patient states when he was 15 he lived [place] and was [place] gymnastics while doing a triple front flip he fell on his head causing a cervical fracture C6,C7, due to insurance issue he was unable to get have a repair  . present day Patient c/o numbness and tingling to his left arm and left leg . Patient pian level ranges from 4/10 to 7/10 with no real relief from pain form rest or medication . Patient went to his PCP and was referred to Dr De La Torre , patient was sent for a MRI as per patient his finding of a incomplete cervical fracture . Patient presents to PST for evaluation for a corpectomy C6,C7, corpectomy cage and plate C5-C6,C6-C7,C7-T1 on 3/8/21     (pre op dx: disease if the spinal cord )  Patient is a 47 y/o male aox3 . Patient c/o neck and back pain with numbness for many years patient states has worsen in the last 6 months .Patient states when he was15 he lived in the [place] and was practicing gymnastics. While he was doing a triple front flip he fell on his head causing a cervical fracture C6,C7,. Patient states due to insurance issue he was unable to get have a repair . Present day, Patient c/o numbness and tingling to his left arm and left leg . Patient pain level ranges from 4/10 to 7/10 with no real relief from pain form rest or with medication . Patient went to his PCP and was referred to Dr De La Torre , patient was sent for a MRI as per patient his finding of a incomplete cervical fracture . Patient presents to PST for evaluation for a corpectomy C6,C7, corpectomy cage and plate C5-C6,C6-C7,C7-T1 on 3/8/21     (pre op dx: disease if the spinal cord )  Patient is a 45 y/o male aox3 . Patient c/o neck and back pain with numbness for many years patient states has worsen in the last 6 months .Patient states when he was15 he lived in the [place] and was practicing gymnastics. While he was doing a triple front flip he fell on his head causing a cervical fracture C6,C7,. Patient states due to insurance issue he was unable to have a repair . Present day, Patient c/o numbness and tingling to his left arm and left leg . Patient pain level ranges from 4/10 to 7/10 with no real relief from pain form rest or with medication . Patient went to his PCP and was referred to Dr De La Torre , patient was sent for a MRI as per patient his finding of a incomplete cervical fracture . Patient presents to PST for evaluation for a corpectomy C6,C7, corpectomy cage and plate C5-C6,C6-C7,C7-T1 on 3/8/21

## 2021-02-16 NOTE — H&P PST ADULT - NSHPATTENDINGPLANDISCUSS_GEN_ALL_CORE
patient on multiple occasions. Patient is suffering from progressive cervical myelopathy cervical deformity We've discussed the front back procedure including a C6-C7 corpectomy in order to address his anterior column pathology. As well supplementing with a posterior instrumented fusion patient is aware of worsening neurologic compromise during surgical procedure revision surgeryincomplete resolution ofsigns and symptoms and based upon the high-risk nature of this case based upon his preop imaging patient is clearly aware of the potential for paralysis loss of gainful employment and disability patient wishes to proceed with surgery based upon progressive cervical myelopathy

## 2021-02-16 NOTE — PATIENT PROFILE ADULT - NSPROHMSYMPCOND_GEN_A_NUR
pre-op wash, MRSA/MSSA , Covid testing & pain management reviewed. Pt states he received spine booklet & denies any questions at this time

## 2021-02-16 NOTE — H&P PST ADULT - NSICDXPROBLEM_GEN_ALL_CORE_FT
PROBLEM DIAGNOSES  Problem: Disease of spinal cord, unspecified  Assessment and Plan: pt is having a corpectomy C6-C7, corpectomy cage plate C5-c6 c6-c7-T1 with Dr De La Torre on 3/8/21     Problem: Educated about COVID-19 virus infection  Assessment and Plan: pt educated on covid testing as well as precations pt is going fro medical clearance     Problem: Need for prophylactic measure  Assessment and Plan: caprini score is 4 modrate VTE risk SCD's ordered surgical team to assess the need for pharm proph

## 2021-02-17 ENCOUNTER — TRANSCRIPTION ENCOUNTER (OUTPATIENT)
Age: 46
End: 2021-02-17

## 2021-02-18 ENCOUNTER — APPOINTMENT (OUTPATIENT)
Dept: FAMILY MEDICINE | Facility: CLINIC | Age: 46
End: 2021-02-18
Payer: COMMERCIAL

## 2021-02-25 ENCOUNTER — APPOINTMENT (OUTPATIENT)
Dept: FAMILY MEDICINE | Facility: CLINIC | Age: 46
End: 2021-02-25
Payer: COMMERCIAL

## 2021-02-25 VITALS
OXYGEN SATURATION: 97 % | HEART RATE: 85 BPM | DIASTOLIC BLOOD PRESSURE: 82 MMHG | HEIGHT: 71 IN | SYSTOLIC BLOOD PRESSURE: 132 MMHG | BODY MASS INDEX: 35.84 KG/M2 | TEMPERATURE: 97.8 F | WEIGHT: 256 LBS

## 2021-02-25 VITALS — DIASTOLIC BLOOD PRESSURE: 80 MMHG | SYSTOLIC BLOOD PRESSURE: 126 MMHG

## 2021-02-25 DIAGNOSIS — Z01.818 ENCOUNTER FOR OTHER PREPROCEDURAL EXAMINATION: ICD-10-CM

## 2021-02-25 PROCEDURE — 99213 OFFICE O/P EST LOW 20 MIN: CPT | Mod: 25

## 2021-02-25 PROCEDURE — 99072 ADDL SUPL MATRL&STAF TM PHE: CPT

## 2021-02-25 PROCEDURE — 36415 COLL VENOUS BLD VENIPUNCTURE: CPT

## 2021-02-25 RX ORDER — LEVOFLOXACIN 750 MG/1
750 TABLET, FILM COATED ORAL DAILY
Qty: 7 | Refills: 0 | Status: DISCONTINUED | COMMUNITY
Start: 2020-12-15 | End: 2021-02-25

## 2021-03-01 LAB
APPEARANCE: CLEAR
BACTERIA UR CULT: NORMAL
BACTERIA: NEGATIVE
BILIRUBIN URINE: NEGATIVE
BLOOD URINE: NEGATIVE
CALCIUM OXALATE CRYSTALS: ABNORMAL
COLOR: YELLOW
GLUCOSE QUALITATIVE U: NEGATIVE
HYALINE CASTS: 0 /LPF
KETONES URINE: NEGATIVE
LEUKOCYTE ESTERASE URINE: NEGATIVE
MICROSCOPIC-UA: NORMAL
NITRITE URINE: NEGATIVE
PH URINE: 6
PROTEIN URINE: NORMAL
RED BLOOD CELLS URINE: 1 /HPF
SPECIFIC GRAVITY URINE: 1.03
SQUAMOUS EPITHELIAL CELLS: 1 /HPF
T4 FREE SERPL-MCNC: 1.2 NG/DL
TSH SERPL-ACNC: 1.78 UIU/ML
UROBILINOGEN URINE: NORMAL
WHITE BLOOD CELLS URINE: 1 /HPF

## 2021-03-01 NOTE — ASSESSMENT
[High Risk Surgery - Intraperitoneal, Intrathoracic or Supringuinal Vascular Procedures] : High Risk Surgery - Intraperitoneal, Intrathoracic or Supringuinal Vascular Procedures - No (0) [Ischemic Heart Disease] : Ischemic Heart Disease - No (0) [Congestive Heart Failure] : Congestive Heart Failure - No (0) [Prior Cerebrovascular Accident or TIA] : Prior Cerebrovascular Accident or TIA - No (0) [Creatinine >= 2mg/dL (1 Point)] : Creatinine >= 2mg/dL - No (0) [Insulin-dependent Diabetic (1 Point)] : Insulin-dependent Diabetic - No (0) [0] : 0 , RCRI Class: I, Risk of Post-Op Cardiac Complications: 3.9%, 95% CI for Risk Estimate: 2.8% - 5.4% [Patient Optimized for Surgery] : Patient optimized for surgery [No Further Testing Recommended] : no further testing recommended [FreeTextEntry4] : 45 yo male with pmhx of HLD medically optimized for Corpectomy C6-C7/Corpectomy cage and plate C5- T1 with Dr. De La Torre on 3/8/2021.

## 2021-03-01 NOTE — HISTORY OF PRESENT ILLNESS
[No Pertinent Cardiac History] : no history of aortic stenosis, atrial fibrillation, coronary artery disease, recent myocardial infarction, or implantable device/pacemaker [No Pertinent Pulmonary History] : no history of asthma, COPD, sleep apnea, or smoking [No Adverse Anesthesia Reaction] : no adverse anesthesia reaction in self or family member [(Patient denies any chest pain, claudication, dyspnea on exertion, orthopnea, palpitations or syncope)] : Patient denies any chest pain, claudication, dyspnea on exertion, orthopnea, palpitations or syncope [Excellent (>10 METs)] : Excellent (>10 METs) [Chronic Anticoagulation] : no chronic anticoagulation [Chronic Kidney Disease] : no chronic kidney disease [Diabetes] : no diabetes [FreeTextEntry1] : Corpectomy C6-C7/ Corpectomy cage and plate C5- T1 [FreeTextEntry2] : 03/08/2021 [FreeTextEntry3] : Dr. Molina  [FreeTextEntry4] : 45 yo male with pmhx of HLD presents for pre-op exam for Corpectomy C6-C7/Corpectomy cage and plate C5- T1 with Dr. De La Torre on 3/8/2021 due to cervical myelopathy with radiculopathy. No acute complaints. Denies fever, chills, cp, palpitations, sob, nv, heat/cold intolerance, dizziness, melena, hematochezia, muscle weakness, loss of sensation, bowel/bladder incontinence or calf pain.\par  [FreeTextEntry7] : EKG shows NSR.

## 2021-03-05 ENCOUNTER — APPOINTMENT (OUTPATIENT)
Dept: DISASTER EMERGENCY | Facility: CLINIC | Age: 46
End: 2021-03-05

## 2021-03-06 LAB — SARS-COV-2 N GENE NPH QL NAA+PROBE: NOT DETECTED

## 2021-03-07 ENCOUNTER — TRANSCRIPTION ENCOUNTER (OUTPATIENT)
Age: 46
End: 2021-03-07

## 2021-03-08 ENCOUNTER — APPOINTMENT (OUTPATIENT)
Dept: ORTHOPEDIC SURGERY | Facility: HOSPITAL | Age: 46
End: 2021-03-08

## 2021-03-08 ENCOUNTER — TRANSCRIPTION ENCOUNTER (OUTPATIENT)
Age: 46
End: 2021-03-08

## 2021-03-08 ENCOUNTER — INPATIENT (INPATIENT)
Facility: HOSPITAL | Age: 46
LOS: 1 days | Discharge: HOME | DRG: 460 | End: 2021-03-10
Attending: ORTHOPAEDIC SURGERY | Admitting: ORTHOPAEDIC SURGERY
Payer: COMMERCIAL

## 2021-03-08 ENCOUNTER — APPOINTMENT (OUTPATIENT)
Dept: NEUROSURGERY | Facility: HOSPITAL | Age: 46
End: 2021-03-08
Payer: COMMERCIAL

## 2021-03-08 VITALS
OXYGEN SATURATION: 97 % | DIASTOLIC BLOOD PRESSURE: 74 MMHG | HEART RATE: 71 BPM | TEMPERATURE: 98 F | HEIGHT: 71 IN | WEIGHT: 273.59 LBS | SYSTOLIC BLOOD PRESSURE: 118 MMHG | RESPIRATION RATE: 18 BRPM

## 2021-03-08 DIAGNOSIS — Z87.81 PERSONAL HISTORY OF (HEALED) TRAUMATIC FRACTURE: Chronic | ICD-10-CM

## 2021-03-08 DIAGNOSIS — G95.9 DISEASE OF SPINAL CORD, UNSPECIFIED: ICD-10-CM

## 2021-03-08 LAB
ALLERGY+IMMUNOLOGY DIAG STUDY NOTE: SIGNIFICANT CHANGE UP
ALLERGY+IMMUNOLOGY DIAG STUDY NOTE: SIGNIFICANT CHANGE UP
ANION GAP SERPL CALC-SCNC: 15 MMOL/L — SIGNIFICANT CHANGE UP (ref 5–17)
BASOPHILS # BLD AUTO: 0.03 K/UL — SIGNIFICANT CHANGE UP (ref 0–0.2)
BASOPHILS NFR BLD AUTO: 0.1 % — SIGNIFICANT CHANGE UP (ref 0–2)
BLD GP AB SCN SERPL QL: SIGNIFICANT CHANGE UP
BLD GP AB SCN SERPL QL: SIGNIFICANT CHANGE UP
BUN SERPL-MCNC: 17 MG/DL — SIGNIFICANT CHANGE UP (ref 8–20)
CALCIUM SERPL-MCNC: 8.6 MG/DL — SIGNIFICANT CHANGE UP (ref 8.6–10.2)
CHLORIDE SERPL-SCNC: 103 MMOL/L — SIGNIFICANT CHANGE UP (ref 98–107)
CO2 SERPL-SCNC: 17 MMOL/L — LOW (ref 22–29)
CREAT SERPL-MCNC: 1.62 MG/DL — HIGH (ref 0.5–1.3)
DIR ANTIGLOB POLYSPECIFIC INTERPRETATION: SIGNIFICANT CHANGE UP
EOSINOPHIL # BLD AUTO: 0 K/UL — SIGNIFICANT CHANGE UP (ref 0–0.5)
EOSINOPHIL NFR BLD AUTO: 0 % — SIGNIFICANT CHANGE UP (ref 0–6)
GAS PNL BLDA: SIGNIFICANT CHANGE UP
GLUCOSE SERPL-MCNC: 154 MG/DL — HIGH (ref 70–99)
HCT VFR BLD CALC: 40.6 % — SIGNIFICANT CHANGE UP (ref 39–50)
HGB BLD-MCNC: 14.2 G/DL — SIGNIFICANT CHANGE UP (ref 13–17)
IMM GRANULOCYTES NFR BLD AUTO: 0.5 % — SIGNIFICANT CHANGE UP (ref 0–1.5)
LYMPHOCYTES # BLD AUTO: 0.87 K/UL — LOW (ref 1–3.3)
LYMPHOCYTES # BLD AUTO: 4.3 % — LOW (ref 13–44)
MAGNESIUM SERPL-MCNC: 1.7 MG/DL — LOW (ref 1.8–2.6)
MCHC RBC-ENTMCNC: 29.3 PG — SIGNIFICANT CHANGE UP (ref 27–34)
MCHC RBC-ENTMCNC: 35 GM/DL — SIGNIFICANT CHANGE UP (ref 32–36)
MCV RBC AUTO: 83.7 FL — SIGNIFICANT CHANGE UP (ref 80–100)
MONOCYTES # BLD AUTO: 0.62 K/UL — SIGNIFICANT CHANGE UP (ref 0–0.9)
MONOCYTES NFR BLD AUTO: 3.1 % — SIGNIFICANT CHANGE UP (ref 2–14)
NEUTROPHILS # BLD AUTO: 18.52 K/UL — HIGH (ref 1.8–7.4)
NEUTROPHILS NFR BLD AUTO: 92 % — HIGH (ref 43–77)
PHOSPHATE SERPL-MCNC: 1.7 MG/DL — LOW (ref 2.4–4.7)
PLATELET # BLD AUTO: 212 K/UL — SIGNIFICANT CHANGE UP (ref 150–400)
POTASSIUM SERPL-MCNC: 4.7 MMOL/L — SIGNIFICANT CHANGE UP (ref 3.5–5.3)
POTASSIUM SERPL-SCNC: 4.7 MMOL/L — SIGNIFICANT CHANGE UP (ref 3.5–5.3)
RBC # BLD: 4.85 M/UL — SIGNIFICANT CHANGE UP (ref 4.2–5.8)
RBC # FLD: 12.3 % — SIGNIFICANT CHANGE UP (ref 10.3–14.5)
SODIUM SERPL-SCNC: 135 MMOL/L — SIGNIFICANT CHANGE UP (ref 135–145)
WBC # BLD: 20.15 K/UL — HIGH (ref 3.8–10.5)
WBC # FLD AUTO: 20.15 K/UL — HIGH (ref 3.8–10.5)

## 2021-03-08 PROCEDURE — 22842 INSERT SPINE FIXATION DEVICE: CPT | Mod: 80

## 2021-03-08 PROCEDURE — 22600 ARTHRD PST TQ 1NTRSPC CRV: CPT | Mod: 62

## 2021-03-08 PROCEDURE — 99222 1ST HOSP IP/OBS MODERATE 55: CPT

## 2021-03-08 PROCEDURE — 63082 REMOVE VERTEBRAL BODY ADD-ON: CPT | Mod: 62

## 2021-03-08 PROCEDURE — 22859 INSJ BIOMECHANICAL DEVICE: CPT

## 2021-03-08 PROCEDURE — 22614 ARTHRD PST TQ 1NTRSPC EA ADD: CPT | Mod: 62

## 2021-03-08 PROCEDURE — 63081 REMOVE VERT BODY DCMPRN CRVL: CPT | Mod: 62

## 2021-03-08 PROCEDURE — 22846 INSERT SPINE FIXATION DEVICE: CPT | Mod: 59

## 2021-03-08 PROCEDURE — 22859 INSJ BIOMECHANICAL DEVICE: CPT | Mod: 80

## 2021-03-08 PROCEDURE — 22846 INSERT SPINE FIXATION DEVICE: CPT | Mod: 80,59

## 2021-03-08 PROCEDURE — 22842 INSERT SPINE FIXATION DEVICE: CPT

## 2021-03-08 PROCEDURE — 86077 PHYS BLOOD BANK SERV XMATCH: CPT

## 2021-03-08 RX ORDER — ONDANSETRON 8 MG/1
4 TABLET, FILM COATED ORAL EVERY 6 HOURS
Refills: 0 | Status: DISCONTINUED | OUTPATIENT
Start: 2021-03-08 | End: 2021-03-10

## 2021-03-08 RX ORDER — FENTANYL CITRATE 50 UG/ML
25 INJECTION INTRAVENOUS
Refills: 0 | Status: DISCONTINUED | OUTPATIENT
Start: 2021-03-08 | End: 2021-03-09

## 2021-03-08 RX ORDER — ACETAMINOPHEN 500 MG
975 TABLET ORAL EVERY 6 HOURS
Refills: 0 | Status: DISCONTINUED | OUTPATIENT
Start: 2021-03-08 | End: 2021-03-10

## 2021-03-08 RX ORDER — SODIUM CHLORIDE 9 MG/ML
1000 INJECTION, SOLUTION INTRAVENOUS
Refills: 0 | Status: DISCONTINUED | OUTPATIENT
Start: 2021-03-08 | End: 2021-03-08

## 2021-03-08 RX ORDER — METHOCARBAMOL 500 MG/1
750 TABLET, FILM COATED ORAL THREE TIMES A DAY
Refills: 0 | Status: DISCONTINUED | OUTPATIENT
Start: 2021-03-08 | End: 2021-03-10

## 2021-03-08 RX ORDER — DEXAMETHASONE 0.5 MG/5ML
8 ELIXIR ORAL ONCE
Refills: 0 | Status: DISCONTINUED | OUTPATIENT
Start: 2021-03-08 | End: 2021-03-09

## 2021-03-08 RX ORDER — ATORVASTATIN CALCIUM 80 MG/1
20 TABLET, FILM COATED ORAL AT BEDTIME
Refills: 0 | Status: DISCONTINUED | OUTPATIENT
Start: 2021-03-08 | End: 2021-03-10

## 2021-03-08 RX ORDER — DIAZEPAM 5 MG
2 TABLET ORAL EVERY 8 HOURS
Refills: 0 | Status: DISCONTINUED | OUTPATIENT
Start: 2021-03-08 | End: 2021-03-10

## 2021-03-08 RX ORDER — MORPHINE SULFATE 50 MG/1
2 CAPSULE, EXTENDED RELEASE ORAL EVERY 4 HOURS
Refills: 0 | Status: DISCONTINUED | OUTPATIENT
Start: 2021-03-08 | End: 2021-03-10

## 2021-03-08 RX ORDER — DIAZEPAM 5 MG
5 TABLET ORAL EVERY 12 HOURS
Refills: 0 | Status: DISCONTINUED | OUTPATIENT
Start: 2021-03-08 | End: 2021-03-08

## 2021-03-08 RX ORDER — ONDANSETRON 8 MG/1
4 TABLET, FILM COATED ORAL ONCE
Refills: 0 | Status: DISCONTINUED | OUTPATIENT
Start: 2021-03-08 | End: 2021-03-10

## 2021-03-08 RX ORDER — VANCOMYCIN HCL 1 G
1000 VIAL (EA) INTRAVENOUS
Refills: 0 | Status: COMPLETED | OUTPATIENT
Start: 2021-03-08 | End: 2021-03-08

## 2021-03-08 RX ORDER — PANTOPRAZOLE SODIUM 20 MG/1
40 TABLET, DELAYED RELEASE ORAL
Refills: 0 | Status: DISCONTINUED | OUTPATIENT
Start: 2021-03-08 | End: 2021-03-10

## 2021-03-08 RX ORDER — VANCOMYCIN HCL 1 G
1750 VIAL (EA) INTRAVENOUS ONCE
Refills: 0 | Status: DISCONTINUED | OUTPATIENT
Start: 2021-03-08 | End: 2021-03-08

## 2021-03-08 RX ORDER — CELECOXIB 200 MG/1
200 CAPSULE ORAL ONCE
Refills: 0 | Status: COMPLETED | OUTPATIENT
Start: 2021-03-08 | End: 2021-03-08

## 2021-03-08 RX ORDER — ASPIRIN/CALCIUM CARB/MAGNESIUM 324 MG
325 TABLET ORAL DAILY
Refills: 0 | Status: DISCONTINUED | OUTPATIENT
Start: 2021-03-09 | End: 2021-03-10

## 2021-03-08 RX ORDER — HYDROMORPHONE HYDROCHLORIDE 2 MG/ML
1 INJECTION INTRAMUSCULAR; INTRAVENOUS; SUBCUTANEOUS
Refills: 0 | Status: DISCONTINUED | OUTPATIENT
Start: 2021-03-08 | End: 2021-03-09

## 2021-03-08 RX ORDER — SODIUM CHLORIDE 9 MG/ML
1000 INJECTION, SOLUTION INTRAVENOUS
Refills: 0 | Status: DISCONTINUED | OUTPATIENT
Start: 2021-03-08 | End: 2021-03-09

## 2021-03-08 RX ORDER — OXYCODONE HYDROCHLORIDE 5 MG/1
10 TABLET ORAL
Refills: 0 | Status: DISCONTINUED | OUTPATIENT
Start: 2021-03-08 | End: 2021-03-10

## 2021-03-08 RX ORDER — OXYCODONE HYDROCHLORIDE 5 MG/1
5 TABLET ORAL
Refills: 0 | Status: DISCONTINUED | OUTPATIENT
Start: 2021-03-08 | End: 2021-03-10

## 2021-03-08 RX ORDER — SODIUM CHLORIDE 9 MG/ML
3 INJECTION INTRAMUSCULAR; INTRAVENOUS; SUBCUTANEOUS EVERY 8 HOURS
Refills: 0 | Status: DISCONTINUED | OUTPATIENT
Start: 2021-03-08 | End: 2021-03-08

## 2021-03-08 RX ORDER — BACITRACIN ZINC 500 UNIT/G
1 OINTMENT IN PACKET (EA) TOPICAL
Refills: 0 | Status: DISCONTINUED | OUTPATIENT
Start: 2021-03-08 | End: 2021-03-10

## 2021-03-08 RX ORDER — ASPIRIN/CALCIUM CARB/MAGNESIUM 324 MG
325 TABLET ORAL DAILY
Refills: 0 | Status: DISCONTINUED | OUTPATIENT
Start: 2021-03-08 | End: 2021-03-08

## 2021-03-08 RX ORDER — ACETAMINOPHEN 500 MG
975 TABLET ORAL ONCE
Refills: 0 | Status: COMPLETED | OUTPATIENT
Start: 2021-03-08 | End: 2021-03-08

## 2021-03-08 RX ORDER — CELECOXIB 200 MG/1
200 CAPSULE ORAL DAILY
Refills: 0 | Status: DISCONTINUED | OUTPATIENT
Start: 2021-03-09 | End: 2021-03-10

## 2021-03-08 RX ORDER — DEXAMETHASONE 0.5 MG/5ML
4 ELIXIR ORAL
Refills: 0 | Status: COMPLETED | OUTPATIENT
Start: 2021-03-08 | End: 2021-03-09

## 2021-03-08 RX ORDER — VANCOMYCIN HCL 1 G
1500 VIAL (EA) INTRAVENOUS ONCE
Refills: 0 | Status: COMPLETED | OUTPATIENT
Start: 2021-03-08 | End: 2021-03-08

## 2021-03-08 RX ORDER — APREPITANT 80 MG/1
40 CAPSULE ORAL ONCE
Refills: 0 | Status: COMPLETED | OUTPATIENT
Start: 2021-03-08 | End: 2021-03-08

## 2021-03-08 RX ADMIN — Medication 975 MILLIGRAM(S): at 17:49

## 2021-03-08 RX ADMIN — Medication 250 MILLIGRAM(S): at 21:55

## 2021-03-08 RX ADMIN — Medication 4 MILLIGRAM(S): at 23:03

## 2021-03-08 RX ADMIN — Medication 975 MILLIGRAM(S): at 06:53

## 2021-03-08 RX ADMIN — SODIUM CHLORIDE 125 MILLILITER(S): 9 INJECTION, SOLUTION INTRAVENOUS at 16:29

## 2021-03-08 RX ADMIN — OXYCODONE HYDROCHLORIDE 10 MILLIGRAM(S): 5 TABLET ORAL at 17:49

## 2021-03-08 RX ADMIN — Medication 975 MILLIGRAM(S): at 23:06

## 2021-03-08 RX ADMIN — CELECOXIB 200 MILLIGRAM(S): 200 CAPSULE ORAL at 06:53

## 2021-03-08 RX ADMIN — APREPITANT 40 MILLIGRAM(S): 80 CAPSULE ORAL at 06:52

## 2021-03-08 RX ADMIN — HYDROMORPHONE HYDROCHLORIDE 1 MILLIGRAM(S): 2 INJECTION INTRAMUSCULAR; INTRAVENOUS; SUBCUTANEOUS at 17:00

## 2021-03-08 RX ADMIN — METHOCARBAMOL 750 MILLIGRAM(S): 500 TABLET, FILM COATED ORAL at 22:39

## 2021-03-08 RX ADMIN — Medication 300 MILLIGRAM(S): at 06:53

## 2021-03-08 RX ADMIN — OXYCODONE HYDROCHLORIDE 10 MILLIGRAM(S): 5 TABLET ORAL at 22:39

## 2021-03-08 RX ADMIN — Medication 100 MILLIGRAM(S): at 23:03

## 2021-03-08 RX ADMIN — SODIUM CHLORIDE 100 MILLILITER(S): 9 INJECTION, SOLUTION INTRAVENOUS at 16:38

## 2021-03-08 RX ADMIN — Medication 4 MILLIGRAM(S): at 17:47

## 2021-03-08 RX ADMIN — OXYCODONE HYDROCHLORIDE 10 MILLIGRAM(S): 5 TABLET ORAL at 23:11

## 2021-03-08 RX ADMIN — Medication 1 APPLICATION(S): at 17:48

## 2021-03-08 RX ADMIN — OXYCODONE HYDROCHLORIDE 10 MILLIGRAM(S): 5 TABLET ORAL at 17:44

## 2021-03-08 RX ADMIN — ATORVASTATIN CALCIUM 20 MILLIGRAM(S): 80 TABLET, FILM COATED ORAL at 22:40

## 2021-03-08 RX ADMIN — Medication 975 MILLIGRAM(S): at 17:47

## 2021-03-08 RX ADMIN — HYDROMORPHONE HYDROCHLORIDE 1 MILLIGRAM(S): 2 INJECTION INTRAMUSCULAR; INTRAVENOUS; SUBCUTANEOUS at 16:30

## 2021-03-08 NOTE — PROGRESS NOTE ADULT - SUBJECTIVE AND OBJECTIVE BOX
BRAYDON DIPLAN    [id number]  PAST MEDICAL & SURGICAL HISTORY:  No pertinent past medical history    H/O cervical fracture  FAMILY HISTORY:  Family history of diabetes mellitus (DM)    STATUS POST: C6, C7 corpectomy and anterior plate placement C5-T1, Posterior cervical laminectomy and fusion C4-T2 for cervical stenosis with myelopathy                    SUBJECTIVE: Patient seen and examined [    ]     Pain (0-10):     OBJECTIVE:     Vital Signs Last 24 Hrs  T(C): 36.4 (08 Mar 2021 06:13), Max: 36.4 (08 Mar 2021 06:13)  T(F): 97.5 (08 Mar 2021 06:13), Max: 97.5 (08 Mar 2021 06:13)  HR: 71 (08 Mar 2021 06:13) (71 - 71)  BP: 118/74 (08 Mar 2021 06:13) (118/74 - 118/74)  BP(mean): --  RR: 18 (08 Mar 2021 06:13) (18 - 18)  SpO2: 97% (08 Mar 2021 06:13) (97% - 97%)    Constitutional: Alert, responsive, in no acute distress.  No dysphonia, no dyspnea.  mild dysphagia as expected.     Abdominal: soft and supple non distended    Lymphatics: no pretibial pitting edema    Spine:          Dressing: [x ] clean/dry/intact anterior cervical and posterior cervical [x ] Other:  Bustos point sites x 3 of scalp-         Drains:  present posterior cervical HV, anterior cervical kiran         Sensation:          Upper extremity                      ax        mc           m          u          r                                                         R          +           +             +           +          +                                               L           +           +             +           +          +         Lower extremity                      sp         dp         saph       salmon         tibial                                                R          +           +             +           +          +                                               L           +           +             +           +          +                Motor exam:        Upper extremity                Bi(c5)  WE(c6)  EE(c7)   FF(c8)                                                R         5/5        5/5        5/5       5/5                                               L          5/5        5/5        5/5       5/5        Lower extremeity          HF(l2)   KE(l3)    TA(l4)   EHL(l5)  GS(s1)                                                 R        5/5        5/5        5/5       5/5         5/5                                               L         5/5        5/5       5/5       5/5          5/5                                                         Vascular:  [ x] warm well perfused; capillary refill <3 seconds                                                   RADIOLOGY & ADDITIONAL STUDIES:      A/P :  46y Male S/P C6, C7 corpectomy and plate placement, posterior cervical laminectomy and fusion as above           -    Pain control- multimodal.  Avoid NSAIDS x 6 weeks as they may block fusion.  - patient may appreciate a soft diet at first, progress as tolerated   -    DVT ppx: [x ]SCDs[x ] Pharmacolgic   MG once daily start tomorrow and x 28 days post op  -    Periop abx:  Clinda and  Vanco  -    Check AM labs    -    Monitor Drain Output     -    Resume home meds as appropriate  -    Physical Therapy/OT  -  Cervical collar with OOB, never to be worn in bed,  mandatory only when riding in a motor vehicle x 28 days post op. .  Remove for hygeine and eating.  -    Weight bearing status: WBAT [x ]   -  Admit to ICU overnight to maintain MAP at or above 85 mm HG, Step down to 2 Bracket in the morning back to orthopedics 3/9/21, Dr Villatoro will follow          -    Dispo: Home most likely 48-72 hours        -  Patient with slowly progressive myelopathy- Maintain MAP at or above 85 mm Hg in ICU overnight, depomedrol 6 mg qid x 4 doses then medrol terry.    - For Bustos point areas of scalp, bacitracin to areas BID x 5 days BRAYDON DIPLAN    [id number]  PAST MEDICAL & SURGICAL HISTORY:  No pertinent past medical history    H/O cervical fracture  FAMILY HISTORY:  Family history of diabetes mellitus (DM)    STATUS POST: C6, C7 corpectomy and anterior plate placement C5-T1, Posterior cervical fusion C4-T2 for cervical stenosis with myelopathy                    SUBJECTIVE: Patient seen and examined [    ]     Pain (0-10):     OBJECTIVE:     Vital Signs Last 24 Hrs  T(C): 36.4 (08 Mar 2021 06:13), Max: 36.4 (08 Mar 2021 06:13)  T(F): 97.5 (08 Mar 2021 06:13), Max: 97.5 (08 Mar 2021 06:13)  HR: 71 (08 Mar 2021 06:13) (71 - 71)  BP: 118/74 (08 Mar 2021 06:13) (118/74 - 118/74)  BP(mean): --  RR: 18 (08 Mar 2021 06:13) (18 - 18)  SpO2: 97% (08 Mar 2021 06:13) (97% - 97%)    Constitutional: Alert, responsive, in no acute distress.  No dysphonia, no dyspnea.  mild dysphagia as expected.     Abdominal: soft and supple non distended    Lymphatics: no pretibial pitting edema    Spine:          Dressing: [x ] clean/dry/intact anterior cervical and posterior cervical [x ] Other:  Bustos point sites x 3 of scalp-         Drains:  present posterior cervical HV, anterior cervical kiran         Sensation:          Upper extremity                      ax        mc           m          u          r                                                         R          +           +             +           +          +                                               L           +           +             +           +          +         Lower extremity                      sp         dp         saph       salmon         tibial                                                R          +           +             +           +          +                                               L           +           +             +           +          +                Motor exam:        Upper extremity                Bi(c5)  WE(c6)  EE(c7)   FF(c8)                                                R         5/5        5/5        5/5       5/5                                               L          5/5        5/5        5/5       5/5        Lower extremeity          HF(l2)   KE(l3)    TA(l4)   EHL(l5)  GS(s1)                                                 R        5/5        5/5        5/5       5/5         5/5                                               L         5/5        5/5       5/5       5/5          5/5                                                         Vascular:  [ x] warm well perfused; capillary refill <3 seconds                                                   RADIOLOGY & ADDITIONAL STUDIES:      A/P :  46y Male S/P C6, C7 corpectomy and plate placement, posterior cervical fusion as above           -    Pain control- multimodal.  Avoid NSAIDS x 6 weeks as they may block fusion.  - patient may appreciate a soft diet at first, progress as tolerated   -    DVT ppx: [x ]SCDs[x ] Pharmacolgic   MG once daily start tomorrow and x 28 days post op  -    Periop abx:  Clinda and  Vanco  -    Check AM labs    -    Monitor Drain Output     -    Resume home meds as appropriate  -    Physical Therapy/OT  -  Cervical collar with OOB, never to be worn in bed,  mandatory only when riding in a motor vehicle x 28 days post op. .  Remove for hygeine and eating.  -    Weight bearing status: WBAT [x ]   -  Admit to ICU overnight to maintain MAP at or above 85 mm HG, Step down to 2 Bracket in the morning back to orthopedics 3/9/21, Dr Villatoro will follow          -    Dispo: Home most likely 48-72 hours        -  Patient with slowly progressive myelopathy- Maintain MAP at or above 85 mm Hg in ICU overnight, depomedrol 6 mg qid x 4 doses then medrol terry.    - For Bustos point areas of scalp, bacitracin to areas BID x 5 days BRAYDON DIPLAN    [id number]  PAST MEDICAL & SURGICAL HISTORY:  No pertinent past medical history    H/O cervical fracture  FAMILY HISTORY:  Family history of diabetes mellitus (DM)    STATUS POST: C6, C7 corpectomy and anterior plate placement C5-T1, Posterior cervical fusion C4-T2 for cervical stenosis with myelopathy                    SUBJECTIVE: Patient seen and examined [    ]     Pain (0-10):     OBJECTIVE:     Vital Signs Last 24 Hrs  T(C): 36.4 (08 Mar 2021 06:13), Max: 36.4 (08 Mar 2021 06:13)  T(F): 97.5 (08 Mar 2021 06:13), Max: 97.5 (08 Mar 2021 06:13)  HR: 71 (08 Mar 2021 06:13) (71 - 71)  BP: 118/74 (08 Mar 2021 06:13) (118/74 - 118/74)  BP(mean): --  RR: 18 (08 Mar 2021 06:13) (18 - 18)  SpO2: 97% (08 Mar 2021 06:13) (97% - 97%)    Constitutional: Alert, responsive, in no acute distress.  No dysphonia, no dyspnea.  mild dysphagia as expected.     Abdominal: soft and supple non distended    Lymphatics: no pretibial pitting edema    Spine:          Dressing: [x ] clean/dry/intact anterior cervical and posterior cervical [x ] Other:  Bustos point sites x 3 of scalp-         Drains:  present anterior cervical kiran         Sensation:          Upper extremity and Lower extremity    unable to evaluate, patient is still sedated s/p procedure                Motor exam:        Upper extremity and Lower extremeity  unable to evaluate as patient is still sedated s/p procedure  Vascular:  [ x] warm well perfused; capillary refill <3 seconds     A/P :  46y Male S/P C6, C7 corpectomy and plate placement, posterior cervical fusion as above           -    Pain control- multimodal.  Avoid NSAIDS x 6 weeks as they may block fusion.  - patient may appreciate a soft diet at first, progress as tolerated   -    DVT ppx: [x ]SCDs[x ] Pharmacolgic   MG once daily start tomorrow and x 28 days post op  -    Periop abx:  Clinda and  Vanco  -    Check AM labs    -    Monitor Drain Output   - when patient steps down from ICU consider /prn O2, Patient with sleep apnea symptoms post surgical   -    Resume home meds as appropriate  -    Physical Therapy/OT  -  Cervical collar with OOB, never to be worn in bed,  mandatory only when riding in a motor vehicle x 28 days post op. .  Remove for hygeine and eating.  -    Weight bearing status: WBAT [x ]   -  Admit to ICU overnight to maintain MAP at or above 85 mm HG, Step down to 2 Bracket in the morning back to orthopedics 3/9/21, Dr Villatoro will follow          -    Dispo: Home most likely 48-72 hours        -  Patient with slowly progressive myelopathy- Maintain MAP at or above 85 mm Hg in ICU overnight, depomedrol 6 mg qid x 4 doses then medrol terry.    - For Bustos point areas of scalp, bacitracin to areas BID x 5 days, will discontinue staples from scalp in office or POD 7- 10

## 2021-03-08 NOTE — DISCHARGE NOTE PROVIDER - CARE PROVIDER_API CALL
Rich De La Torre (DO)  Orthopaedic Surgery  [address] [place] [address], [address]  [place] [address]  Phone: [phone]  Fax: [phone]  Follow Up Time:

## 2021-03-08 NOTE — BRIEF OPERATIVE NOTE - OPERATION/FINDINGS
I assisted all aspects of operative positioning for anterior and posterior aspects of case.  I obtained fluoroscopic imaging confirming the appropriate visualization of levels.  I participated in operative time out. I cleansed the operative area with Betadine and RN then prepped with DuraPrep. I participated in draping with blue drapes and ioban and then ensured that drapes were appropriately placed. I assisted with maintenance of hemostasis with Surgi-Erick, Ray-Enrico, intermittent irrigation, consistent suction.  I verified that screws were placed, tightened and torqued per Emili protocol. Copious irrigation was then used, final hemostasis was performed with FloSeal, Ray-Enrico, and bipolar cautery. Fluoroscopic imaging confirmed appropriate placement of implants. I confirmed with operating surgeon and neuro monitoring that final neuro monitoring was stable. 10 round ANGELO drain was placed anterior, 10 flat hv posterior. and  Closure was performed platysma with 2-0 vicryl, superficial fascia with 3-0 vicryl and skin with 3-0 PROLINE anterior, 0-vicryl, then 2-0 vicryl then monocryl, dermabond, steri, mepelex posterior.
severe stenosis and kyphosis

## 2021-03-08 NOTE — CONSULT NOTE ADULT - SUBJECTIVE AND OBJECTIVE BOX
HPI:    (pre op dx: disease if the spinal cord )  Patient is a 45 y/o male aox3 . Patient c/o neck and back pain with numbness for many years patient states has worsen in the last 6 months .Patient states when he was15 [place] lived [place]. While he was doing a triple front flip he fell on his head causing a cervical fracture C6,C7,. Patient states due to insurance issue he was unable to have a repair . Present day, Patient c/o numbness and tingling to his left arm and left leg . Patient pain level ranges from 4/10 to 7/10 with no real relief from pain form rest or with medication . Patient went to his PCP and was referred to Dr De La Torre , patient was sent for a MRI as per patient his finding of a incomplete cervical fracture . Patient presents to PST for evaluation for a corpectomy C6,C7, corpectomy cage and plate C5-C6,C6-C7,C7-T1 on 3/8/21   (16 Feb 2021 09:53)      PAST MEDICAL & SURGICAL HISTORY:  No pertinent past medical history    H/O cervical fracture      acetaminophen   Tablet .. 975 milliGRAM(s) Oral every 6 hours  aluminum hydroxide/magnesium hydroxide/simethicone Suspension 30 milliLiter(s) Oral every 12 hours PRN  atorvastatin 20 milliGRAM(s) Oral at bedtime  BACItracin   Ointment 1 Application(s) Topical two times a day  clindamycin IVPB 900 milliGRAM(s) IV Intermittent <User Schedule>  dexAMETHasone  Injectable 4 milliGRAM(s) IV Push four times a day  dexAMETHasone  IVPB 8 milliGRAM(s) IV Intermittent once PRN  dextrose 5% + sodium chloride 0.45%. 1000 milliLiter(s) IV Continuous <Continuous>  diazepam    Tablet 2 milliGRAM(s) Oral every 8 hours PRN  fentaNYL    Injectable 25 MICROGram(s) IV Push every 5 minutes PRN  HYDROmorphone  Injectable 1 milliGRAM(s) IV Push every 10 minutes PRN  methocarbamol 750 milliGRAM(s) Oral three times a day  morphine  - Injectable 2 milliGRAM(s) IV Push every 4 hours PRN  ondansetron Injectable 4 milliGRAM(s) IV Push once PRN  ondansetron Injectable 4 milliGRAM(s) IV Push every 6 hours PRN  oxyCODONE    IR 5 milliGRAM(s) Oral every 3 hours PRN  oxyCODONE    IR 10 milliGRAM(s) Oral every 3 hours PRN  pantoprazole    Tablet 40 milliGRAM(s) Oral before breakfast  vancomycin  IVPB 1000 milliGRAM(s) IV Intermittent <User Schedule>    MEDICATIONS  (STANDING):  acetaminophen   Tablet .. 975 milliGRAM(s) Oral every 6 hours  atorvastatin 20 milliGRAM(s) Oral at bedtime  BACItracin   Ointment 1 Application(s) Topical two times a day  clindamycin IVPB 900 milliGRAM(s) IV Intermittent <User Schedule>  dexAMETHasone  Injectable 4 milliGRAM(s) IV Push four times a day  dextrose 5% + sodium chloride 0.45%. 1000 milliLiter(s) (100 mL/Hr) IV Continuous <Continuous>  methocarbamol 750 milliGRAM(s) Oral three times a day  pantoprazole    Tablet 40 milliGRAM(s) Oral before breakfast  vancomycin  IVPB 1000 milliGRAM(s) IV Intermittent <User Schedule>    MEDICATIONS  (PRN):  aluminum hydroxide/magnesium hydroxide/simethicone Suspension 30 milliLiter(s) Oral every 12 hours PRN Indigestion  dexAMETHasone  IVPB 8 milliGRAM(s) IV Intermittent once PRN Nausea and/or Vomiting  diazepam    Tablet 2 milliGRAM(s) Oral every 8 hours PRN muscle spasm refractory to methocarbamol  fentaNYL    Injectable 25 MICROGram(s) IV Push every 5 minutes PRN Moderate Pain (4 - 6)  HYDROmorphone  Injectable 1 milliGRAM(s) IV Push every 10 minutes PRN Severe Pain (7 - 10)  morphine  - Injectable 2 milliGRAM(s) IV Push every 4 hours PRN severe pain not controlled with current meds  ondansetron Injectable 4 milliGRAM(s) IV Push once PRN Nausea and/or Vomiting  ondansetron Injectable 4 milliGRAM(s) IV Push every 6 hours PRN Nausea  oxyCODONE    IR 5 milliGRAM(s) Oral every 3 hours PRN Moderate Pain (4 - 6)  oxyCODONE    IR 10 milliGRAM(s) Oral every 3 hours PRN Severe Pain (7 - 10)      Allergies    penicillin (Hives)    Intolerances        SOCIAL HISTORY:  No Smoking, Etoh,   FAMILY HISTORY:  Family history of diabetes mellitus (DM)        LABS:                        14.2   20.15 )-----------( 212      ( 08 Mar 2021 17:21 )             40.6     03-08    135  |  103  |  17.0  ----------------------------<  154<H>  4.7   |  17.0<L>  |  1.62<H>    Ca    8.6      08 Mar 2021 17:21  Phos  1.7     03-08  Mg     1.7     03-08      ROS  - Headache  + Neck Stiffness, mild pain  - Chest Pain  - SOB  - Abd pain  - Pelvic Pain  - Leg Pain  - Head Ache    Vital Signs Last 24 Hrs  T(C): 37 (08 Mar 2021 16:05), Max: 37 (08 Mar 2021 16:05)  T(F): 98.6 (08 Mar 2021 16:05), Max: 98.6 (08 Mar 2021 16:05)  HR: 99 (08 Mar 2021 18:00) (71 - 103)  BP: 138/88 (08 Mar 2021 18:00) (118/74 - 138/88)  BP(mean): 100 (08 Mar 2021 18:00) (92 - 121)  RR: 16 (08 Mar 2021 18:00) (16 - 24)  SpO2: 96% (08 Mar 2021 18:00) (96% - 100%)    HEENT: PEARLA  Neck: Supple Incision clean   Cardio: S1 S2 No Murmur distant heart sounds   Pulm: CTA No Rales or Ronchi  Abd: Soft NT ND BS+  Rectal - refused  Ext: No DCT 5/5 power upper extremity sensation intact   Skin: No Rash  Neuro: Awake Pleasant    Acute Renal Failure - agree with hydration,  may hold Celebrex until creatinine stabilizes   Hyperglycemia - stress induces, decadron given  Mild HTN - would keep elevated until creatine improves    HPI:    (pre op dx: disease if the spinal cord )  Patient is a 45 y/o male aox3 . Patient c/o neck and back pain with numbness for many years patient states has worsen in the last 6 months .Patient states when he was15 he lived [place] the [place] and was [place]astics. While he was doing a triple front flip he fell on his head causing a cervical fracture C6,C7,. Patient states due to insurance issue he was unable to have a repair . Present day, Patient c/o numbness and tingling to his left arm and left leg . Patient pain level ranges from 4/10 to 7/10 with no real relief from pain form rest or with medication . Patient went to his PCP and was referred to Dr De La Torre , patient was sent for a MRI as per patient his finding of a incomplete cervical fracture . Patient presents to PST for evaluation for a corpectomy C6,C7, corpectomy cage and plate C5-C6,C6-C7,C7-T1 on 3/8/21   (16 Feb 2021 09:53)      PAST MEDICAL & SURGICAL HISTORY:  No pertinent past medical history    H/O cervical fracture      acetaminophen   Tablet .. 975 milliGRAM(s) Oral every 6 hours  aluminum hydroxide/magnesium hydroxide/simethicone Suspension 30 milliLiter(s) Oral every 12 hours PRN  atorvastatin 20 milliGRAM(s) Oral at bedtime  BACItracin   Ointment 1 Application(s) Topical two times a day  clindamycin IVPB 900 milliGRAM(s) IV Intermittent <User Schedule>  dexAMETHasone  Injectable 4 milliGRAM(s) IV Push four times a day  dexAMETHasone  IVPB 8 milliGRAM(s) IV Intermittent once PRN  dextrose 5% + sodium chloride 0.45%. 1000 milliLiter(s) IV Continuous <Continuous>  diazepam    Tablet 2 milliGRAM(s) Oral every 8 hours PRN  fentaNYL    Injectable 25 MICROGram(s) IV Push every 5 minutes PRN  HYDROmorphone  Injectable 1 milliGRAM(s) IV Push every 10 minutes PRN  methocarbamol 750 milliGRAM(s) Oral three times a day  morphine  - Injectable 2 milliGRAM(s) IV Push every 4 hours PRN  ondansetron Injectable 4 milliGRAM(s) IV Push once PRN  ondansetron Injectable 4 milliGRAM(s) IV Push every 6 hours PRN  oxyCODONE    IR 5 milliGRAM(s) Oral every 3 hours PRN  oxyCODONE    IR 10 milliGRAM(s) Oral every 3 hours PRN  pantoprazole    Tablet 40 milliGRAM(s) Oral before breakfast  vancomycin  IVPB 1000 milliGRAM(s) IV Intermittent <User Schedule>    MEDICATIONS  (STANDING):  acetaminophen   Tablet .. 975 milliGRAM(s) Oral every 6 hours  atorvastatin 20 milliGRAM(s) Oral at bedtime  BACItracin   Ointment 1 Application(s) Topical two times a day  clindamycin IVPB 900 milliGRAM(s) IV Intermittent <User Schedule>  dexAMETHasone  Injectable 4 milliGRAM(s) IV Push four times a day  dextrose 5% + sodium chloride 0.45%. 1000 milliLiter(s) (100 mL/Hr) IV Continuous <Continuous>  methocarbamol 750 milliGRAM(s) Oral three times a day  pantoprazole    Tablet 40 milliGRAM(s) Oral before breakfast  vancomycin  IVPB 1000 milliGRAM(s) IV Intermittent <User Schedule>    MEDICATIONS  (PRN):  aluminum hydroxide/magnesium hydroxide/simethicone Suspension 30 milliLiter(s) Oral every 12 hours PRN Indigestion  dexAMETHasone  IVPB 8 milliGRAM(s) IV Intermittent once PRN Nausea and/or Vomiting  diazepam    Tablet 2 milliGRAM(s) Oral every 8 hours PRN muscle spasm refractory to methocarbamol  fentaNYL    Injectable 25 MICROGram(s) IV Push every 5 minutes PRN Moderate Pain (4 - 6)  HYDROmorphone  Injectable 1 milliGRAM(s) IV Push every 10 minutes PRN Severe Pain (7 - 10)  morphine  - Injectable 2 milliGRAM(s) IV Push every 4 hours PRN severe pain not controlled with current meds  ondansetron Injectable 4 milliGRAM(s) IV Push once PRN Nausea and/or Vomiting  ondansetron Injectable 4 milliGRAM(s) IV Push every 6 hours PRN Nausea  oxyCODONE    IR 5 milliGRAM(s) Oral every 3 hours PRN Moderate Pain (4 - 6)  oxyCODONE    IR 10 milliGRAM(s) Oral every 3 hours PRN Severe Pain (7 - 10)      Allergies    penicillin (Hives)    Intolerances        SOCIAL HISTORY:  No Smoking, Etoh,   FAMILY HISTORY:  Family history of diabetes mellitus (DM)        LABS:                        14.2   20.15 )-----------( 212      ( 08 Mar 2021 17:21 )             40.6     03-08    135  |  103  |  17.0  ----------------------------<  154<H>  4.7   |  17.0<L>  |  1.62<H>    Ca    8.6      08 Mar 2021 17:21  Phos  1.7     03-08  Mg     1.7     03-08      ROS  - Headache  + Neck Stiffness, mild pain  - Chest Pain  - SOB  - Abd pain  - Pelvic Pain  - Leg Pain  - Head Ache    Vital Signs Last 24 Hrs  T(C): 37 (08 Mar 2021 16:05), Max: 37 (08 Mar 2021 16:05)  T(F): 98.6 (08 Mar 2021 16:05), Max: 98.6 (08 Mar 2021 16:05)  HR: 99 (08 Mar 2021 18:00) (71 - 103)  BP: 138/88 (08 Mar 2021 18:00) (118/74 - 138/88)  BP(mean): 100 (08 Mar 2021 18:00) (92 - 121)  RR: 16 (08 Mar 2021 18:00) (16 - 24)  SpO2: 96% (08 Mar 2021 18:00) (96% - 100%)    HEENT: PEARLA  Neck: Supple Incision clean   Cardio: S1 S2 No Murmur distant heart sounds   Pulm: CTA No Rales or Ronchi  Abd: Soft NT ND BS+  Rectal - refused  Ext: No DCT 5/5 power upper extremity sensation intact   Skin: No Rash  Neuro: Awake Pleasant    Acute Renal Failure - agree with hydration, preop renal sono normal,  may hold Celebrex until creatinine stabilizes   Hyperglycemia - stress induces, decadron given  Mild HTN - would keep elevated until creatine improves

## 2021-03-08 NOTE — DISCHARGE NOTE PROVIDER - NSDCMRMEDTOKEN_GEN_ALL_CORE_FT
atorvastatin 20 mg oral tablet: 1 tab(s) orally once a day   acetaminophen 325 mg oral tablet: 3 tab(s) orally every 6 hours, As Needed for mild pain  aspirin 325 mg oral delayed release tablet: 1 tab(s) orally once a day  atorvastatin 20 mg oral tablet: 1 tab(s) orally once a day  celecoxib 200 mg oral capsule: 1 cap(s) orally once a day  dexamethasone 4 mg oral tablet: 6 tab(s) x 1 days  5 tab(s) x 1 days  4 tab(s) x 1 days  3 tab(s)  x 1 days  2 tab(s)  x 1 days  1 tab(s)  x 1 days  methocarbamol 750 mg oral tablet: 1 tab(s) orally 3 times a day, As Needed -for muscle spasm   Multiple Vitamins oral tablet: 1 tab(s) orally once a day  oxyCODONE 5 mg oral tablet: 1 tab(s) orally every 3 hours, As needed, Moderate Pain (4 - 6) MDD:four  pantoprazole 40 mg oral delayed release tablet: 1 tab(s) orally once a day (before a meal)

## 2021-03-08 NOTE — DISCHARGE NOTE PROVIDER - HOSPITAL COURSE
Patient was admitted for C6, C7 corpectomy and anterior plate placement C5-T1, Posterior cervical fusion C4-T2 for cervical stenosis with myelopathy on 3/8/21, post operative course was uneventful.  Drain was pulled without incident and dry dressing placed.  PT/OT worked with patient for acute rehabilitation process.  Pain is now adequately controlled and patient is able to go (home)/ (acute rehab), as she can accomplish ADL with help from family member at this time.  Medical comorbidities  were well controlled during hospital stay. Patient was admitted for C6, C7 corpectomy and anterior plate placement C5-T1, Posterior cervical fusion C4-T2 for cervical stenosis with myelopathy on 3/8/21, post operative course was uneventful.  Drain was pulled without incident and dry dressing placed.  PT/OT worked with patient for acute rehabilitation process.  Pain is now adequately controlled and patient is able to go home, as she can accomplish ADL with help from family member at this time.  Medical comorbidities  were well controlled during hospital stay.

## 2021-03-08 NOTE — CONSULT NOTE ADULT - ATTENDING COMMENTS
I have seen and examined the patient.  details per resident note.  Admit to SICU for BP monitoring  and support  will discuss with [facility] surgery timings of DVT chemoprophylaxes

## 2021-03-08 NOTE — CONSULT NOTE ADULT - SUBJECTIVE AND OBJECTIVE BOX
SICU Consultation Note  =====================================================  HPI: 46M, PMH of HLD, known history of cervical spine injury s/p gymnastics accident 15 years ago in the [facility] [place]. 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 Patient taken to the OR today by orthospine team for anterior cervical corpectomy with posterior fusion of C3-T2. Tolerated procedure well, however concern for undiagnosed JIA on intubation. Patient extubated and brought to the SICU post-operatively for hemodynamic monitoring.      Surgery Information  OR time: 6hr      EBL: 300           PAST MEDICAL & SURGICAL HISTORY:  No pertinent past medical history    H/O cervical fracture      Home Meds:   Allergies: penicillin (Hives)    Soc:   Advanced Directives: Presumed Full Code     ROS:    General: Non-Contributory  Skin/Breast: Non-Contributory  Ophthalmologic: Non-Contributory  ENMT: Non-Contributory  Respiratory and Thorax: Non-Contributory  Cardiovascular: Non-Contributory  Gastrointestinal: Non-Contributory  Genitourinary: Non-Contributory  Musculoskeletal: Non-Contributory  Neurological: Non-Contributory  Psychiatric: Non-Contributory  Hematology/Lymphatics: Non-Contributory  Endocrine: Non-Contributory  Allergic/Immunologic: Non-Contributory    CURRENT MEDICATIONS:   --------------------------------------------------------------------------------------  Neurologic Medications  acetaminophen   Tablet .. 975 milliGRAM(s) Oral every 6 hours  diazepam    Tablet 2 milliGRAM(s) Oral every 8 hours PRN muscle spasm refractory to methocarbamol  fentaNYL    Injectable 25 MICROGram(s) IV Push every 5 minutes PRN Moderate Pain (4 - 6)  HYDROmorphone  Injectable 1 milliGRAM(s) IV Push every 10 minutes PRN Severe Pain (7 - 10)  methocarbamol 750 milliGRAM(s) Oral three times a day  morphine  - Injectable 2 milliGRAM(s) IV Push every 4 hours PRN severe pain not controlled with current meds  ondansetron Injectable 4 milliGRAM(s) IV Push once PRN Nausea and/or Vomiting  ondansetron Injectable 4 milliGRAM(s) IV Push every 6 hours PRN Nausea  oxyCODONE    IR 5 milliGRAM(s) Oral every 3 hours PRN Moderate Pain (4 - 6)  oxyCODONE    IR 10 milliGRAM(s) Oral every 3 hours PRN Severe Pain (7 - 10)    Respiratory Medications  -N/a   Cardiovascular Medications  -Atorvastatin for HLD    Gastrointestinal Medications  aluminum hydroxide/magnesium hydroxide/simethicone Suspension 30 milliLiter(s) Oral every 12 hours PRN Indigestion  dextrose 5% + sodium chloride 0.45%. 1000 milliLiter(s) IV Continuous <Continuous>  lactated ringers. 1000 milliLiter(s) IV Continuous <Continuous>    Genitourinary Medications    Hematologic/Oncologic Medications  aspirin enteric coated 325 milliGRAM(s) Oral daily    Antimicrobial/Immunologic Medications  clindamycin IVPB 900 milliGRAM(s) IV Intermittent <User Schedule>  vancomycin  IVPB 1000 milliGRAM(s) IV Intermittent <User Schedule>    Endocrine/Metabolic Medications  atorvastatin 20 milliGRAM(s) Oral at bedtime  dexAMETHasone  Injectable 4 milliGRAM(s) IV Push four times a day  dexAMETHasone  IVPB 8 milliGRAM(s) IV Intermittent once PRN Nausea and/or Vomiting    Topical/Other Medications  BACItracin   Ointment 1 Application(s) Topical two times a day    --------------------------------------------------------------------------------------    VITAL SIGNS, INS/OUTS (last 24 hours):  --------------------------------------------------------------------------------------  ICU Vital Signs Last 24 Hrs  T(C): 36.4 (08 Mar 2021 06:13), Max: 36.4 (08 Mar 2021 06:13)  T(F): 97.5 (08 Mar 2021 06:13), Max: 97.5 (08 Mar 2021 06:13)  HR: 95 (08 Mar 2021 16:30) (71 - 103)  BP: 129/114 (08 Mar 2021 16:15) (118/74 - 137/92)  BP(mean): 121 (08 Mar 2021 16:15) (104 - 121)  ABP: 107/79 (08 Mar 2021 16:30) (107/79 - 133/71)  ABP(mean): 89 (08 Mar 2021 16:30) (89 - 92)  RR: 23 (08 Mar 2021 16:30) (18 - 24)  SpO2: 100% (08 Mar 2021 16:30) (97% - 100%)      EXAM:  General/Neuro  RASS: -1 to +1  GCS: GCS 15  Exam: Normal, NAD, alert, oriented x 3, no focal deficits. PERRLA 2mm    Respiratory  Exam: Lungs low air entry , Normal expansion.   Non-rebreather at 5L, weaning     Cardiovascular  Exam: S1, S2.  Regular rate and rhythm.  Cardiac Rhythm: Normal Sinus Rhythm      GI  Exam: Abdomen soft, Non-tender, Non-distended  Current Diet: Regular       Tubes/Lines/Drains Ni, L-radial A-line   [x] Peripheral IV  [x] Arterial Line: [x] L-radial       Extremities  Exam: Extremities warm, pink, well-perfused.        Derm:  Exam: Good skin turgor, no skin breakdown.      :   Exam: Ni catheter in place.     LABS  --------------------------------------------------------------------------------------  post-op labs pending   --------------------------------------------------------------------------------------

## 2021-03-08 NOTE — DISCHARGE NOTE PROVIDER - NSDCFUADDINST_GEN_ALL_CORE_FT
Follow-up with Dr. De La Torre within 7-10 days. Dressing change daily until dry, then leave dressing in place until office follow-up. Pain medications as indicated and titrated to patient's needs. Patient will begin aspirin 325mg daily for 4 weeks post-operatively for clot prevention. Ambulate with assistance of walker. Contact office if the wound becomes red, opens or discharge increases. Avoid NSAIDS x 6 weeks as they may block fusion. Cervical collar with OOB, never to be worn in bed,  mandatory only when riding in a motor vehicle x 28 days post op. .  Remove for hygeine and eating. Continue medrol dosepak as prescribed. Follow-up with Dr. De La Torre within 7-10 days. Dressings-  leave dressing in place until office follow-up unless they are falling off prior to visit. Pain medications as indicated and titrated to patient's needs. Patient will begin aspirin 325mg daily for 4 weeks post-operatively for clot prevention. Ambulate with assistance of walker. Contact office if the wound becomes red, opens or discharge increases.  Cervical collar when out of bed for comfort, not to be worn in bed,  mandatory only when riding in a motor vehicle x 28 days post op.  Remove for hygeine and eating. Continue medrol dosepak as prescribed.

## 2021-03-08 NOTE — DISCHARGE NOTE PROVIDER - NSDCCPCAREPLAN_GEN_ALL_CORE_FT
PRINCIPAL DISCHARGE DIAGNOSIS  Diagnosis: Disease of spinal cord, unspecified  Assessment and Plan of Treatment:

## 2021-03-08 NOTE — BRIEF OPERATIVE NOTE - NSICDXBRIEFPREOP_GEN_ALL_CORE_FT
PRE-OP DIAGNOSIS:  Myelopathy of cervical spinal cord with cervical radiculopathy 08-Mar-2021 09:11:07  Lili Araiza  
PRE-OP DIAGNOSIS:  Myelopathy of cervical spinal cord with cervical radiculopathy 08-Mar-2021 09:11:07  Lili Araiza

## 2021-03-08 NOTE — CONSULT NOTE ADULT - ASSESSMENT
ASSESSMENT:  46y Male ***    PLAN:   Neurologic:   Respiratory:   Cardiovascular:   Gastrointestinal/Nutrition:   Renal/Genitourinary:   Hematologic:   Infectious Disease:   Lines/Tubes:  Endocrine:   Disposition:     --------------------------------------------------------------------------------------    Critical Care Diagnoses:   ASSESSMENT:  46y Male hx of cervical spine myelopathy, now POD 0 s/p anterior corpectomy with posterior fusion of C3-T2      PLAN:   Neurologic: q4hr neurochecks, anterior cervical spine drain in place, C-collar for comfort   Respiratory:   Cardiovascular: Maintain MAPS >85 for adequate spinal cord perfusion, Atorvastatin 20mg qHS for HLD  Gastrointestinal/Nutrition: Regular diet, Protonix   Renal/Genitourinary: Ni in place, d/c 3/9 and TOV  Hematologic: post-op labs pending  Infectious Disease: Clinda/Vanc per SCIP protocol   Lines/Tubes:  Endocrine:   Disposition:     --------------------------------------------------------------------------------------    Critical Care Diagnoses:   ASSESSMENT:  46y Male hx of cervical spine myelopathy, now POD 0 s/p anterior corpectomy with posterior fusion of C3-T2      PLAN:   Neurologic: GCS 15, q4hr neurochecks, anterior cervical spine drain in place, C-collar for comfort   Respiratory: Supplemental O2 2-3L, possible undiagnosed JIA, maintain SpO2>95%  Cardiovascular: Maintain MAPS >85 for adequate spinal cord perfusion, Atorvastatin 20mg qHS for HLD  Gastrointestinal/Nutrition: Regular diet, Protonix   Renal/Genitourinary: Ni in place, d/c 3/9 and TOV  Hematologic:  daily starting 3/9, post-op labs pending  Infectious Disease: Clinda/Vanc per SCIP protocol   Lines/Tubes: Ni, L-radial [place],PIVsx2  Endocrine: Dexamethasone x3 doses post-op  Disposition: SICU for hemodynamic monitoring     --------------------------------------------------------------------------------------    Critical Care Diagnoses: Hemodynamic monitoring post spinal procedure

## 2021-03-09 LAB
ANION GAP SERPL CALC-SCNC: 14 MMOL/L — SIGNIFICANT CHANGE UP (ref 5–17)
BASOPHILS # BLD AUTO: 0.02 K/UL — SIGNIFICANT CHANGE UP (ref 0–0.2)
BASOPHILS NFR BLD AUTO: 0.1 % — SIGNIFICANT CHANGE UP (ref 0–2)
BUN SERPL-MCNC: 16 MG/DL — SIGNIFICANT CHANGE UP (ref 8–20)
CALCIUM SERPL-MCNC: 8.1 MG/DL — LOW (ref 8.6–10.2)
CHLORIDE SERPL-SCNC: 103 MMOL/L — SIGNIFICANT CHANGE UP (ref 98–107)
CO2 SERPL-SCNC: 21 MMOL/L — LOW (ref 22–29)
CREAT SERPL-MCNC: 1.06 MG/DL — SIGNIFICANT CHANGE UP (ref 0.5–1.3)
EOSINOPHIL # BLD AUTO: 0 K/UL — SIGNIFICANT CHANGE UP (ref 0–0.5)
EOSINOPHIL NFR BLD AUTO: 0 % — SIGNIFICANT CHANGE UP (ref 0–6)
GLUCOSE BLDC GLUCOMTR-MCNC: 125 MG/DL — HIGH (ref 70–99)
GLUCOSE BLDC GLUCOMTR-MCNC: 128 MG/DL — HIGH (ref 70–99)
GLUCOSE BLDC GLUCOMTR-MCNC: 134 MG/DL — HIGH (ref 70–99)
GLUCOSE BLDC GLUCOMTR-MCNC: 135 MG/DL — HIGH (ref 70–99)
GLUCOSE SERPL-MCNC: 135 MG/DL — HIGH (ref 70–99)
HCT VFR BLD CALC: 36.2 % — LOW (ref 39–50)
HGB BLD-MCNC: 12.8 G/DL — LOW (ref 13–17)
IMM GRANULOCYTES NFR BLD AUTO: 0.6 % — SIGNIFICANT CHANGE UP (ref 0–1.5)
LYMPHOCYTES # BLD AUTO: 1.17 K/UL — SIGNIFICANT CHANGE UP (ref 1–3.3)
LYMPHOCYTES # BLD AUTO: 7.8 % — LOW (ref 13–44)
MAGNESIUM SERPL-MCNC: 1.8 MG/DL — SIGNIFICANT CHANGE UP (ref 1.6–2.6)
MCHC RBC-ENTMCNC: 29.6 PG — SIGNIFICANT CHANGE UP (ref 27–34)
MCHC RBC-ENTMCNC: 35.4 GM/DL — SIGNIFICANT CHANGE UP (ref 32–36)
MCV RBC AUTO: 83.6 FL — SIGNIFICANT CHANGE UP (ref 80–100)
MONOCYTES # BLD AUTO: 0.58 K/UL — SIGNIFICANT CHANGE UP (ref 0–0.9)
MONOCYTES NFR BLD AUTO: 3.9 % — SIGNIFICANT CHANGE UP (ref 2–14)
NEUTROPHILS # BLD AUTO: 13.19 K/UL — HIGH (ref 1.8–7.4)
NEUTROPHILS NFR BLD AUTO: 87.6 % — HIGH (ref 43–77)
PHOSPHATE SERPL-MCNC: 2.8 MG/DL — SIGNIFICANT CHANGE UP (ref 2.4–4.7)
PLATELET # BLD AUTO: 186 K/UL — SIGNIFICANT CHANGE UP (ref 150–400)
POTASSIUM SERPL-MCNC: 4.2 MMOL/L — SIGNIFICANT CHANGE UP (ref 3.5–5.3)
POTASSIUM SERPL-SCNC: 4.2 MMOL/L — SIGNIFICANT CHANGE UP (ref 3.5–5.3)
RBC # BLD: 4.33 M/UL — SIGNIFICANT CHANGE UP (ref 4.2–5.8)
RBC # FLD: 12.2 % — SIGNIFICANT CHANGE UP (ref 10.3–14.5)
SODIUM SERPL-SCNC: 138 MMOL/L — SIGNIFICANT CHANGE UP (ref 135–145)
WBC # BLD: 15.05 K/UL — HIGH (ref 3.8–10.5)
WBC # FLD AUTO: 15.05 K/UL — HIGH (ref 3.8–10.5)

## 2021-03-09 PROCEDURE — 99232 SBSQ HOSP IP/OBS MODERATE 35: CPT

## 2021-03-09 RX ORDER — GLUCAGON INJECTION, SOLUTION 0.5 MG/.1ML
1 INJECTION, SOLUTION SUBCUTANEOUS ONCE
Refills: 0 | Status: DISCONTINUED | OUTPATIENT
Start: 2021-03-09 | End: 2021-03-10

## 2021-03-09 RX ORDER — DEXTROSE 50 % IN WATER 50 %
25 SYRINGE (ML) INTRAVENOUS ONCE
Refills: 0 | Status: DISCONTINUED | OUTPATIENT
Start: 2021-03-09 | End: 2021-03-10

## 2021-03-09 RX ORDER — INSULIN LISPRO 100/ML
VIAL (ML) SUBCUTANEOUS
Refills: 0 | Status: DISCONTINUED | OUTPATIENT
Start: 2021-03-09 | End: 2021-03-10

## 2021-03-09 RX ORDER — SODIUM CHLORIDE 9 MG/ML
1000 INJECTION, SOLUTION INTRAVENOUS
Refills: 0 | Status: DISCONTINUED | OUTPATIENT
Start: 2021-03-09 | End: 2021-03-10

## 2021-03-09 RX ORDER — SODIUM,POTASSIUM PHOSPHATES 278-250MG
1 POWDER IN PACKET (EA) ORAL ONCE
Refills: 0 | Status: COMPLETED | OUTPATIENT
Start: 2021-03-09 | End: 2021-03-09

## 2021-03-09 RX ORDER — ATORVASTATIN CALCIUM 80 MG/1
1 TABLET, FILM COATED ORAL
Qty: 0 | Refills: 0 | DISCHARGE

## 2021-03-09 RX ORDER — MAGNESIUM SULFATE 500 MG/ML
2 VIAL (ML) INJECTION ONCE
Refills: 0 | Status: COMPLETED | OUTPATIENT
Start: 2021-03-09 | End: 2021-03-09

## 2021-03-09 RX ORDER — SODIUM CHLORIDE 9 MG/ML
1000 INJECTION, SOLUTION INTRAVENOUS ONCE
Refills: 0 | Status: COMPLETED | OUTPATIENT
Start: 2021-03-09 | End: 2021-03-09

## 2021-03-09 RX ORDER — DEXTROSE 50 % IN WATER 50 %
12.5 SYRINGE (ML) INTRAVENOUS ONCE
Refills: 0 | Status: DISCONTINUED | OUTPATIENT
Start: 2021-03-09 | End: 2021-03-10

## 2021-03-09 RX ORDER — DEXTROSE 50 % IN WATER 50 %
15 SYRINGE (ML) INTRAVENOUS ONCE
Refills: 0 | Status: DISCONTINUED | OUTPATIENT
Start: 2021-03-09 | End: 2021-03-10

## 2021-03-09 RX ADMIN — Medication 975 MILLIGRAM(S): at 06:14

## 2021-03-09 RX ADMIN — Medication 975 MILLIGRAM(S): at 12:34

## 2021-03-09 RX ADMIN — Medication 1 APPLICATION(S): at 05:39

## 2021-03-09 RX ADMIN — SODIUM CHLORIDE 1000 MILLILITER(S): 9 INJECTION, SOLUTION INTRAVENOUS at 00:59

## 2021-03-09 RX ADMIN — Medication 4 MILLIGRAM(S): at 05:38

## 2021-03-09 RX ADMIN — Medication 975 MILLIGRAM(S): at 11:08

## 2021-03-09 RX ADMIN — METHOCARBAMOL 750 MILLIGRAM(S): 500 TABLET, FILM COATED ORAL at 05:39

## 2021-03-09 RX ADMIN — CELECOXIB 200 MILLIGRAM(S): 200 CAPSULE ORAL at 12:34

## 2021-03-09 RX ADMIN — Medication 4 MILLIGRAM(S): at 11:09

## 2021-03-09 RX ADMIN — Medication 975 MILLIGRAM(S): at 05:38

## 2021-03-09 RX ADMIN — Medication 975 MILLIGRAM(S): at 00:01

## 2021-03-09 RX ADMIN — Medication 975 MILLIGRAM(S): at 23:16

## 2021-03-09 RX ADMIN — METHOCARBAMOL 750 MILLIGRAM(S): 500 TABLET, FILM COATED ORAL at 20:52

## 2021-03-09 RX ADMIN — Medication 1 PACKET(S): at 08:50

## 2021-03-09 RX ADMIN — PANTOPRAZOLE SODIUM 40 MILLIGRAM(S): 20 TABLET, DELAYED RELEASE ORAL at 05:38

## 2021-03-09 RX ADMIN — Medication 325 MILLIGRAM(S): at 11:08

## 2021-03-09 RX ADMIN — METHOCARBAMOL 750 MILLIGRAM(S): 500 TABLET, FILM COATED ORAL at 17:15

## 2021-03-09 RX ADMIN — Medication 50 GRAM(S): at 06:48

## 2021-03-09 RX ADMIN — Medication 1 APPLICATION(S): at 17:16

## 2021-03-09 RX ADMIN — CELECOXIB 200 MILLIGRAM(S): 200 CAPSULE ORAL at 11:08

## 2021-03-09 RX ADMIN — Medication 975 MILLIGRAM(S): at 23:56

## 2021-03-09 RX ADMIN — ATORVASTATIN CALCIUM 20 MILLIGRAM(S): 80 TABLET, FILM COATED ORAL at 20:52

## 2021-03-09 RX ADMIN — Medication 975 MILLIGRAM(S): at 17:15

## 2021-03-09 NOTE — OCCUPATIONAL THERAPY INITIAL EVALUATION ADULT - PRECAUTIONS/LIMITATIONS, REHAB EVAL
c-spine precautions; c-collar for comfort only c-spine precautions; c-collar with out of bed for comfort is not mandatory except when being driven in a car x 28 days post-op; remove collar in bed, with hygiene and with eating

## 2021-03-09 NOTE — PHYSICAL THERAPY INITIAL EVALUATION ADULT - PERTINENT HX OF CURRENT PROBLEM, REHAB EVAL
Patient is a 47 y/o male aox3 . Patient c/o neck and back pain with numbness for many years patient states has worsen in the last 6 months .Patient states when he was15 [place] and [place]. While he was doing a triple front flip he fell on his head causing a cervical fracture C6,C7,. now s/p C6, C7 corpectomy and anterior plate place

## 2021-03-09 NOTE — PHYSICAL THERAPY INITIAL EVALUATION ADULT - DID THE PATIENT HAVE SURGERY?
C6, C7 corpectomy and anterior plate placement C5-T1, Posterior cervical fusion C4-T2 for cervical stenosis with myelopathy/yes

## 2021-03-09 NOTE — PHARMACOTHERAPY INTERVENTION NOTE - COMMENTS
Spoke with patient at bedside regarding medication list. Patient reports only on atorvastatin and multivitamin at home

## 2021-03-09 NOTE — CHART NOTE - NSCHARTNOTEFT_GEN_A_CORE
Antibody Interpretation: Cold Autoantibody (CAA)    Patient is a 47 y/o male aox3. Patient c/o neck and back pain with numbness for many years, states has worsened in the last 6 months. Patient states when he was 15 years old, he had trauma to cervical spine (fracture of C6, C7 vertebrae).  Blood sample received on 03/08/21 has a positive antibody screen and autocontrol, and the patient's plasma reacts with the majority of cells tested. 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 Cold autoantibodies are targeted against “self” antigens on the red cell surface, and react best at temperatures well below body temperature (contrast to warm autoantibodies). Fortunately, they are benign in most people. Occasionally, cold autoantibodies can react in warmer temperatures (i.e., they may have a broad “thermal amplitude“) and can destroy red cells. These antibodies can also interfere with blood typing and compatibility testing, causing delays in and/or prevent finding compatible units for transfusion. Please allow sufficient time for pre-transfusion blood bank workup.

## 2021-03-09 NOTE — PROGRESS NOTE ADULT - ATTENDING COMMENTS
Attending statement:  I have personally seen this patient, and formed a face to face diagnostic evaluation on this patient on this date.  I have reviewed the PA, NP and or Medical/PA student and/or Resident documentation and agree with the history, physical exam and plan of care except if noted otherwise.     patient seen and examined on the morning of March 9. He is in the ICU he is doing very well neurologically appears to be improved her ready. Patient be transferred down to the [facility] orthopedic spine unit. Continue her current care regimen such protocols
I have seen and examined the patient during SICU rounds from 930-11a  events noted.    Neuro: awake, no gross deficit, WHEELER, ambulate  CV: HD normal  Pulm: Sat ok  GI: tolerating diet  : urine flow adequate, electrolytes ok  ID: oleksandr op abx.  Heme: h/h stale    Plan:  Adequate evolution, OK to transfer to floor.

## 2021-03-09 NOTE — PHYSICAL THERAPY INITIAL EVALUATION ADULT - ADDITIONAL COMMENTS
pt states he lives with his wife and two young daughters in a split level house with 4 steps to enter then 4 down and 6 up (+rails). independent with all mobility prior to admit. wife home and able to assist. no DME.

## 2021-03-09 NOTE — PROGRESS NOTE ADULT - SUBJECTIVE AND OBJECTIVE BOX
[facility] [id number]    46yMale  Disease of spinal cord    Family history of diabetes mellitus (DM)    Handoff    No pertinent past medical history    Cervical arthritis with myelopathy    Myelopathy of cervical spinal cord with cervical radiculopathy    Cervical corpectomy    Disease of spinal cord, unspecified    Disease of spinal cord, unspecified    Educated about COVID-19 virus infection    Need for prophylactic measure    Cervical corpectomy    H/O cervical fracture    DISEASE OF SPINAL CORD, UNSPEC    [id number]      POST OPERATIVE DAY #:1  STATUS POST: C6, C7 corpectomy and anterior plate placement C5-T1, Posterior cervical fusion C4-T2 for cervical stenosis with myelopathy                      SUBJECTIVE: Patient seen and examined at the bedside. Patient reports pain is controlled with prescribed medication. Patient is participating with PT. Patient denies acute motor or sensory changes. Denies CP, SOB, dizziness, HA.     OBJECTIVE:   Vital Signs Last 24 Hrs  T(C): 37 (09 Mar 2021 08:00), Max: 37.3 (08 Mar 2021 19:15)  T(F): 98.6 (09 Mar 2021 08:00), Max: 99.2 (08 Mar 2021 19:15)  HR: 88 (09 Mar 2021 08:00) (86 - 103)  BP: 114/70 (09 Mar 2021 03:00) (114/70 - 138/88)  BP(mean): 81 (09 Mar 2021 03:00) (81 - 121)  RR: 16 (09 Mar 2021 08:00) (13 - 24)  SpO2: 96% (09 Mar 2021 08:00) (93% - 100%)\par     Constitutional: Alert, awake  Spine:          Anterior Dressing: clean/dry/intact. No drainage or discharge. present anterior cervical kiran, tube patent functioning properly.          Posterior Dressing: clean/dry/intact. No drainage or discharge.         Drains: output-Drain (mL): 50 mL           Sensation:          Upper extremity                ax        mc           m          u          r                                                         R          +           +             +           +          +                                               L           +           +             +           +          +         Lower extremity             sp         dp         saph       salmon         tibial                                                R          +           +             +           +          +                                               L           +           +             +           +          +                     Motor exam:          Upper extremity              Bi(c5)  WE(c6)  EE(c7)   FF(c8)                                                R         5/5        5/5        5/5       5/5                                               L          5/5        5/5        5/5       5/5         Lower extremity          HF(l2)   KE(l3)    TA(l4)   EHL(l5)  GS(s1)                                                 R        5/5        5/5        5/5       5/5         5/5                                               L         5/5        5/5       5/5       5/5          5/5                                                 B/L LE: warm well perfused; capillary refill <3 seconds. BCR. Calves soft NT           LABS:                        12.8   15.05 )-----------( 186      ( 09 Mar 2021 04:25 )             36.2     A/P :  46y Male S/P C6, C7 corpectomy and anterior plate placement C5-T1, Posterior cervical fusion C4-T2 for cervical stenosis with myelopathy     POD#1           -    Pain control- multimodal.  Avoid NSAIDS x 6 weeks as they may block fusion.  -    DVT ppx: [x ]SCDs[x ] Pharmacolgic   MG once daily start tomorrow and x 28 days post op  -    Monitor Drain Output  -    Physical Therapy/OT  -   Cervical collar with OOB, never to be worn in bed,  mandatory only when riding in a motor vehicle x 28 days post op. Remove for hygeine and eating.  -   Weight bearing status: WBAT [x ]   -   Admit to ICU overnight to maintain MAP at or above 85 mm HG, Step down to 2 Bracket in the morning back to orthopedics 3/9/21, Dr Villatoro will follow          -  Dispo: Home most likely 48-72 hours        -  Depo-medrol 6 mg qid x 4 doses then medrol terry.    -  For Bustos point areas of scalp, bacitracin to areas BID x 5 days, will discontinue staples from scalp in office or POD 7- 10

## 2021-03-09 NOTE — OCCUPATIONAL THERAPY INITIAL EVALUATION ADULT - ADDITIONAL COMMENTS
Pt lives in house with 4 GEO and 6 steps inside up to bedroom and bathroom. Bathroom has bathtub with curtains. Pt does not own any DME. Pt is right handed. Pt drives. Pt's wife is able and available to assist upon discharge.

## 2021-03-09 NOTE — OCCUPATIONAL THERAPY INITIAL EVALUATION ADULT - RANGE OF MOTION EXAMINATION, UPPER EXTREMITY
bilateral shoulders assessed to 90 degrees only due to c-spine precautions, bilateral elbows AROM WFL, bilateral gross grasp and digit extension AROM WFL

## 2021-03-09 NOTE — PHYSICAL THERAPY INITIAL EVALUATION ADULT - PREDICTED DURATION OF THERAPY (DAYS/WKS), PT EVAL
pt independent with all mobility without devices at this time. no skilled needs at this time. will not follow.

## 2021-03-09 NOTE — PROGRESS NOTE ADULT - SUBJECTIVE AND OBJECTIVE BOX
HPI:    (pre op dx: disease if the spinal cord )  Patient is a 45 y/o male aox3 . Patient c/o neck and back pain with numbness for many years patient states has worsen in the last 6 months .Patient states when he was15 he [place] and was [place] gymnastics. While he was doing a triple front flip he fell on his head causing a cervical fracture C6,C7,. Patient states due to insurance issue he was unable to have a repair . Present day, Patient c/o numbness and tingling to his left arm and left leg . Patient pain level ranges from 4/10 to 7/10 with no real relief from pain form rest or with medication . Patient went to his PCP and was referred to Dr De La Torre , patient was sent for a MRI as per patient his finding of a incomplete cervical fracture . Patient presents to [facility] for evaluation for a corpectomy C6,C7, corpectomy cage and plate C5-C6,C6-C7,C7-T1 on 3/8/21   (16 Feb 2021 09:53)     Allergies    penicillin (Hives)    Intolerances      No pertinent past medical history      MEDICATIONS  (STANDING):  acetaminophen   Tablet .. 975 milliGRAM(s) Oral every 6 hours  aspirin enteric coated 325 milliGRAM(s) Oral daily  atorvastatin 20 milliGRAM(s) Oral at bedtime  BACItracin   Ointment 1 Application(s) Topical two times a day  celecoxib 200 milliGRAM(s) Oral daily  dextrose 40% Gel 15 Gram(s) Oral once  dextrose 5%. 1000 milliLiter(s) (50 mL/Hr) IV Continuous <Continuous>  dextrose 5%. 1000 milliLiter(s) (100 mL/Hr) IV Continuous <Continuous>  dextrose 50% Injectable 25 Gram(s) IV Push once  dextrose 50% Injectable 12.5 Gram(s) IV Push once  dextrose 50% Injectable 25 Gram(s) IV Push once  glucagon  Injectable 1 milliGRAM(s) IntraMuscular once  insulin lispro (ADMELOG) corrective regimen sliding scale   SubCutaneous Before meals and at bedtime  methocarbamol 750 milliGRAM(s) Oral three times a day  pantoprazole    Tablet 40 milliGRAM(s) Oral before breakfast    MEDICATIONS  (PRN):  aluminum hydroxide/magnesium hydroxide/simethicone Suspension 30 milliLiter(s) Oral every 12 hours PRN Indigestion  diazepam    Tablet 2 milliGRAM(s) Oral every 8 hours PRN muscle spasm refractory to methocarbamol  morphine  - Injectable 2 milliGRAM(s) IV Push every 4 hours PRN severe pain not controlled with current meds  ondansetron Injectable 4 milliGRAM(s) IV Push once PRN Nausea and/or Vomiting  ondansetron Injectable 4 milliGRAM(s) IV Push every 6 hours PRN Nausea  oxyCODONE    IR 5 milliGRAM(s) Oral every 3 hours PRN Moderate Pain (4 - 6)  oxyCODONE    IR 10 milliGRAM(s) Oral every 3 hours PRN Severe Pain (7 - 10)                           12.8   15.05 )-----------( 186      ( 09 Mar 2021 04:25 )             36.2     03-09    138  |  103  |  16.0  ----------------------------<  135<H>  4.2   |  21.0<L>  |  1.06    Ca    8.1<L>      09 Mar 2021 04:25  Phos  2.8     03-09  Mg     1.8     03-09        ;  Vital Signs Last 24 Hrs  T(C): 36.7 (09 Mar 2021 16:00), Max: 37.2 (08 Mar 2021 23:45)  T(F): 98 (09 Mar 2021 16:00), Max: 99 (08 Mar 2021 23:45)  HR: 83 (09 Mar 2021 18:00) (80 - 101)  BP: 141/83 (09 Mar 2021 18:00) (114/70 - 142/73)  BP(mean): 99 (09 Mar 2021 18:00) (81 - 99)  RR: 16 (09 Mar 2021 18:00) (13 - 22)  SpO2: 94% (09 Mar 2021 18:00) (93% - 98%)  CAPILLARY BLOOD GLUCOSE      03-08 @ 07:01  -  03-09 @ 07:00  --------------------------------------------------------  IN: 2500 mL / OUT: 3325 mL / NET: -825 mL    03-09 @ 07:01  -  03-09 @ 19:17  --------------------------------------------------------  IN: 75 mL / OUT: 1325 mL / NET: -1250 mL      Patient feeling better No CP, No SOB, No N/V Mild pain tolerating PO mild throat pain     HEENT: WILMAN  Neck: Supple Incisions clean   Cardio: S1 S2 No Murmur   Pulm: CTA No Rales or Ronchi  Abd: Soft NT ND BS+  Rectal - refused  Ext: No DCT 5/5 power upper extremity sensation intact   Skin: No Rash  Neuro: Awake Pleasant    Acute Renal Failure - improved continue to monitor urine output   Hyperglycemia - stress induces, decadron given  Mild HTN - would keep elevated until creatine improves   Nocturnal Desaturations  - spoke with pt about JIA he will be evaluated as an outpatient

## 2021-03-09 NOTE — CHART NOTE - NSCHARTNOTEFT_GEN_A_CORE
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 975 milliGRAM(s) Oral every 6 hours  aluminum hydroxide/magnesium hydroxide/simethicone Suspension 30 milliLiter(s) Oral every 12 hours PRN  aspirin enteric coated 325 milliGRAM(s) Oral daily  atorvastatin 20 milliGRAM(s) Oral at bedtime  BACItracin   Ointment 1 Application(s) Topical two times a day  celecoxib 200 milliGRAM(s) Oral daily  dexAMETHasone  IVPB 8 milliGRAM(s) IV Intermittent once PRN  dextrose 40% Gel 15 Gram(s) Oral once  dextrose 5%. 1000 milliLiter(s) IV Continuous <Continuous>  dextrose 5%. 1000 milliLiter(s) IV Continuous <Continuous>  dextrose 50% Injectable 25 Gram(s) IV Push once  dextrose 50% Injectable 12.5 Gram(s) IV Push once  dextrose 50% Injectable 25 Gram(s) IV Push once  diazepam    Tablet 2 milliGRAM(s) Oral every 8 hours PRN  glucagon  Injectable 1 milliGRAM(s) IntraMuscular once  insulin lispro (ADMELOG) corrective regimen sliding scale   SubCutaneous Before meals and at bedtime  methocarbamol 750 milliGRAM(s) Oral three times a day  morphine  - Injectable 2 milliGRAM(s) IV Push every 4 hours PRN  ondansetron Injectable 4 milliGRAM(s) IV Push once PRN  ondansetron Injectable 4 milliGRAM(s) IV Push every 6 hours PRN  oxyCODONE    IR 5 milliGRAM(s) Oral every 3 hours PRN  oxyCODONE    IR 10 milliGRAM(s) Oral every 3 hours PRN  pantoprazole    Tablet 40 milliGRAM(s) Oral before breakfast      [ x] I attest I have reviewed and reconciled all medications prior to transfer    IV Fluids        Antibiotics: Anahi-op        I have discussed this case with Dr. De La Torre upon transfer and all questions regarding ICU course were answered.  The following items are to be followed up:    - Transfer to Ortho service for further management  - Medrol dose terry  - Monitor blood glucose  - Drain management as per Ortho  - Continue ASA  - PT/OT/PMR  - Dispo planning

## 2021-03-10 ENCOUNTER — TRANSCRIPTION ENCOUNTER (OUTPATIENT)
Age: 46
End: 2021-03-10

## 2021-03-10 VITALS
TEMPERATURE: 98 F | OXYGEN SATURATION: 98 % | SYSTOLIC BLOOD PRESSURE: 140 MMHG | DIASTOLIC BLOOD PRESSURE: 80 MMHG | RESPIRATION RATE: 18 BRPM | HEART RATE: 82 BPM

## 2021-03-10 LAB
ANION GAP SERPL CALC-SCNC: 13 MMOL/L — SIGNIFICANT CHANGE UP (ref 5–17)
BASOPHILS # BLD AUTO: 0.02 K/UL — SIGNIFICANT CHANGE UP (ref 0–0.2)
BASOPHILS NFR BLD AUTO: 0.1 % — SIGNIFICANT CHANGE UP (ref 0–2)
BUN SERPL-MCNC: 19 MG/DL — SIGNIFICANT CHANGE UP (ref 8–20)
CALCIUM SERPL-MCNC: 8.5 MG/DL — LOW (ref 8.6–10.2)
CHLORIDE SERPL-SCNC: 105 MMOL/L — SIGNIFICANT CHANGE UP (ref 98–107)
CO2 SERPL-SCNC: 22 MMOL/L — SIGNIFICANT CHANGE UP (ref 22–29)
CREAT SERPL-MCNC: 0.88 MG/DL — SIGNIFICANT CHANGE UP (ref 0.5–1.3)
EOSINOPHIL # BLD AUTO: 0 K/UL — SIGNIFICANT CHANGE UP (ref 0–0.5)
EOSINOPHIL NFR BLD AUTO: 0 % — SIGNIFICANT CHANGE UP (ref 0–6)
GLUCOSE BLDC GLUCOMTR-MCNC: 102 MG/DL — HIGH (ref 70–99)
GLUCOSE SERPL-MCNC: 92 MG/DL — SIGNIFICANT CHANGE UP (ref 70–99)
HCT VFR BLD CALC: 36.7 % — LOW (ref 39–50)
HGB BLD-MCNC: 12.7 G/DL — LOW (ref 13–17)
IMM GRANULOCYTES NFR BLD AUTO: 0.6 % — SIGNIFICANT CHANGE UP (ref 0–1.5)
LYMPHOCYTES # BLD AUTO: 1.93 K/UL — SIGNIFICANT CHANGE UP (ref 1–3.3)
LYMPHOCYTES # BLD AUTO: 12.1 % — LOW (ref 13–44)
MCHC RBC-ENTMCNC: 29.7 PG — SIGNIFICANT CHANGE UP (ref 27–34)
MCHC RBC-ENTMCNC: 34.6 GM/DL — SIGNIFICANT CHANGE UP (ref 32–36)
MCV RBC AUTO: 85.7 FL — SIGNIFICANT CHANGE UP (ref 80–100)
MONOCYTES # BLD AUTO: 1.27 K/UL — HIGH (ref 0–0.9)
MONOCYTES NFR BLD AUTO: 8 % — SIGNIFICANT CHANGE UP (ref 2–14)
NEUTROPHILS # BLD AUTO: 12.65 K/UL — HIGH (ref 1.8–7.4)
NEUTROPHILS NFR BLD AUTO: 79.2 % — HIGH (ref 43–77)
PLATELET # BLD AUTO: 162 K/UL — SIGNIFICANT CHANGE UP (ref 150–400)
POTASSIUM SERPL-MCNC: 4.4 MMOL/L — SIGNIFICANT CHANGE UP (ref 3.5–5.3)
POTASSIUM SERPL-SCNC: 4.4 MMOL/L — SIGNIFICANT CHANGE UP (ref 3.5–5.3)
RBC # BLD: 4.28 M/UL — SIGNIFICANT CHANGE UP (ref 4.2–5.8)
RBC # FLD: 12.7 % — SIGNIFICANT CHANGE UP (ref 10.3–14.5)
SODIUM SERPL-SCNC: 140 MMOL/L — SIGNIFICANT CHANGE UP (ref 135–145)
WBC # BLD: 15.97 K/UL — HIGH (ref 3.8–10.5)
WBC # FLD AUTO: 15.97 K/UL — HIGH (ref 3.8–10.5)

## 2021-03-10 PROCEDURE — 85018 HEMOGLOBIN: CPT

## 2021-03-10 PROCEDURE — 97163 PT EVAL HIGH COMPLEX 45 MIN: CPT

## 2021-03-10 PROCEDURE — 83605 ASSAY OF LACTIC ACID: CPT

## 2021-03-10 PROCEDURE — 82962 GLUCOSE BLOOD TEST: CPT

## 2021-03-10 PROCEDURE — 36415 COLL VENOUS BLD VENIPUNCTURE: CPT

## 2021-03-10 PROCEDURE — 86901 BLOOD TYPING SEROLOGIC RH(D): CPT

## 2021-03-10 PROCEDURE — 86850 RBC ANTIBODY SCREEN: CPT

## 2021-03-10 PROCEDURE — 84132 ASSAY OF SERUM POTASSIUM: CPT

## 2021-03-10 PROCEDURE — 85014 HEMATOCRIT: CPT

## 2021-03-10 PROCEDURE — 86922 COMPATIBILITY TEST ANTIGLOB: CPT

## 2021-03-10 PROCEDURE — 83735 ASSAY OF MAGNESIUM: CPT

## 2021-03-10 PROCEDURE — 82947 ASSAY GLUCOSE BLOOD QUANT: CPT

## 2021-03-10 PROCEDURE — 86870 RBC ANTIBODY IDENTIFICATION: CPT

## 2021-03-10 PROCEDURE — 82803 BLOOD GASES ANY COMBINATION: CPT

## 2021-03-10 PROCEDURE — 82435 ASSAY OF BLOOD CHLORIDE: CPT

## 2021-03-10 PROCEDURE — 86880 COOMBS TEST DIRECT: CPT

## 2021-03-10 PROCEDURE — 84295 ASSAY OF SERUM SODIUM: CPT

## 2021-03-10 PROCEDURE — 86900 BLOOD TYPING SEROLOGIC ABO: CPT

## 2021-03-10 PROCEDURE — 84100 ASSAY OF PHOSPHORUS: CPT

## 2021-03-10 PROCEDURE — 97167 OT EVAL HIGH COMPLEX 60 MIN: CPT

## 2021-03-10 PROCEDURE — C1889: CPT

## 2021-03-10 PROCEDURE — 82330 ASSAY OF CALCIUM: CPT

## 2021-03-10 PROCEDURE — C1713: CPT

## 2021-03-10 PROCEDURE — 76000 FLUOROSCOPY <1 HR PHYS/QHP: CPT

## 2021-03-10 PROCEDURE — 85025 COMPLETE CBC W/AUTO DIFF WBC: CPT

## 2021-03-10 PROCEDURE — 80048 BASIC METABOLIC PNL TOTAL CA: CPT

## 2021-03-10 RX ORDER — DEXAMETHASONE 0.5 MG/5ML
1 ELIXIR ORAL
Qty: 21 | Refills: 0
Start: 2021-03-10 | End: 2021-03-15

## 2021-03-10 RX ORDER — ASPIRIN/CALCIUM CARB/MAGNESIUM 324 MG
1 TABLET ORAL
Qty: 30 | Refills: 0
Start: 2021-03-10 | End: 2021-04-08

## 2021-03-10 RX ORDER — METHOCARBAMOL 500 MG/1
1 TABLET, FILM COATED ORAL
Qty: 15 | Refills: 0
Start: 2021-03-10 | End: 2021-03-14

## 2021-03-10 RX ORDER — CELECOXIB 200 MG/1
1 CAPSULE ORAL
Qty: 7 | Refills: 0
Start: 2021-03-10 | End: 2021-03-16

## 2021-03-10 RX ORDER — PANTOPRAZOLE SODIUM 20 MG/1
1 TABLET, DELAYED RELEASE ORAL
Qty: 30 | Refills: 0
Start: 2021-03-10 | End: 2021-04-08

## 2021-03-10 RX ORDER — OXYCODONE HYDROCHLORIDE 5 MG/1
1 TABLET ORAL
Qty: 10 | Refills: 0
Start: 2021-03-10

## 2021-03-10 RX ORDER — ACETAMINOPHEN 500 MG
3 TABLET ORAL
Qty: 0 | Refills: 0 | DISCHARGE
Start: 2021-03-10

## 2021-03-10 RX ADMIN — Medication 1 APPLICATION(S): at 05:31

## 2021-03-10 RX ADMIN — Medication 24 MILLIGRAM(S): at 06:31

## 2021-03-10 RX ADMIN — PANTOPRAZOLE SODIUM 40 MILLIGRAM(S): 20 TABLET, DELAYED RELEASE ORAL at 05:30

## 2021-03-10 RX ADMIN — Medication 975 MILLIGRAM(S): at 05:30

## 2021-03-10 RX ADMIN — Medication 975 MILLIGRAM(S): at 05:41

## 2021-03-10 RX ADMIN — Medication 325 MILLIGRAM(S): at 11:02

## 2021-03-10 RX ADMIN — Medication 975 MILLIGRAM(S): at 11:02

## 2021-03-10 RX ADMIN — CELECOXIB 200 MILLIGRAM(S): 200 CAPSULE ORAL at 11:02

## 2021-03-10 RX ADMIN — METHOCARBAMOL 750 MILLIGRAM(S): 500 TABLET, FILM COATED ORAL at 05:31

## 2021-03-10 NOTE — DISCHARGE NOTE NURSING/CASE MANAGEMENT/SOCIAL WORK - PATIENT PORTAL LINK FT
You can access the FollowMyHealth Patient Portal offered by [facility] by registering at the following website: [email or web address]. By joining [employer]’s FollowMyHealth portal, you will also be able to view your health information using other applications (apps) compatible with our system.

## 2021-03-10 NOTE — PROGRESS NOTE ADULT - SUBJECTIVE AND OBJECTIVE BOX
Drain volume is unchanged from last night. Drain removed. Site cleaned with betadine. no active drainage. No erythema. Dermabond applied to the site. Occlusive dressing applied. No complications.

## 2021-03-11 RX ORDER — CELECOXIB 200 MG/1
200 CAPSULE ORAL DAILY
Qty: 7 | Refills: 0 | Status: DISCONTINUED | COMMUNITY
Start: 2021-03-11 | End: 2021-03-11

## 2021-03-16 ENCOUNTER — APPOINTMENT (OUTPATIENT)
Dept: ORTHOPEDIC SURGERY | Facility: CLINIC | Age: 46
End: 2021-03-16
Payer: COMMERCIAL

## 2021-03-16 PROCEDURE — 72040 X-RAY EXAM NECK SPINE 2-3 VW: CPT

## 2021-03-16 PROCEDURE — 99024 POSTOP FOLLOW-UP VISIT: CPT

## 2021-03-16 NOTE — ADDENDUM
[FreeTextEntry1] : Documented by Kam Ybarra acting as a scribe for Lili Burns  on 08/20/2020. All medical record entries made by the Scribe were at my, Lili Burns , direction and personally dictated by me on 08/20/2020 . I have reviewed the chart and agree that the record accurately reflects my personal performance of the history, physical exam, assessment and plan. I have also personally directed, reviewed, and agreed with the chart.

## 2021-03-16 NOTE — HISTORY OF PRESENT ILLNESS
[___ Weeks Post Op] : [unfilled] weeks post op [Clean/Dry/Intact] : clean, dry and intact [Healed] : healed [Neuro Intact] : an unremarkable neurological exam [Vascular Intact] : ~T peripheral vascular exam normal [Xray (Date:___)] : [unfilled] Xray -  [Doing Well] : is doing well [Excellent Pain Control] : has excellent pain control [No Sign of Infection] : is showing no signs of infection [Chills] : no chills [Fever] : no fever [Erythema] : not erythematous [Discharge] : absent of discharge [Swelling] : not swollen [Dehiscence] : not dehisced [de-identified] : 1 week S/p anterior cervical corpectomy at C6 and C7, with cage and interbody from C5 - T1, posterior cervical fusion from C4 - T2. DOS: 03- [de-identified] : 46 year old M 1 week S/p anterior cervical corpectomy at C6 and C7, with cage and interbody from C5 - T1, posterior cervical fusion from C4 - T2. Patient is very pleased with his post surgery outcome and states his pre operative symptoms, including his myelopathy and UE radiculopathy, are greatly improved. He has been taking Tylenol for pain. \par \par  [de-identified] : constitutional- No acute distress\par neurologic- no LE radiculitis.\par skin- posterior and anterior incision dry clean and intact. The incision was well healed. Skin staple and sutures were removed. Derma bond was applied. \par musculoskeletal - motor strength is 5/5.  [de-identified] : Xray of a cervical spine taken 03/16/2021 demonstrates S/p  anterior cervical corpectomy at C6 and C7, with cage and interbody from C5 - T1, posterior cervical fusion from C4 - T2. Surgical implants appear well positioned. [de-identified] : 1 week S/p anterior cervical corpectomy at C6 and C7 with cage and interbody from C5 - T1, posterior cervical fusion from C4 - T2.  [de-identified] : I informed the patient he can walk unlimited and shower. I informed him he can shower mainly from the front and he should try not to over saturate the posterior incision. I advised the patient to reframe from repetitive bending, lifting, or twisting as well as to reframe from lifting more than 10 - 15 pounds. Driving ability will be evaluated at the one month post operative visit, at this time he should reframe from driving, he should also be wearing his cervical collar while he is in a vehicle. A skin staple was successfully removed. I advised the patient to reframe from taking ibuprofen/ Aleve /Advil as these may reduce the rate of fusion. The patient will follow up in three weeks for repeat clinical assessment.

## 2021-03-27 ENCOUNTER — INPATIENT (INPATIENT)
Facility: HOSPITAL | Age: 46
LOS: 2 days | Discharge: HOME | DRG: 270 | End: 2021-03-30
Attending: HOSPITALIST | Admitting: HOSPITALIST
Payer: COMMERCIAL

## 2021-03-27 VITALS
HEIGHT: 71 IN | WEIGHT: 270.07 LBS | DIASTOLIC BLOOD PRESSURE: 77 MMHG | OXYGEN SATURATION: 90 % | SYSTOLIC BLOOD PRESSURE: 109 MMHG | HEART RATE: 118 BPM | RESPIRATION RATE: 22 BRPM

## 2021-03-27 DIAGNOSIS — I26.99 OTHER PULMONARY EMBOLISM WITHOUT ACUTE COR PULMONALE: ICD-10-CM

## 2021-03-27 DIAGNOSIS — E66.9 OBESITY, UNSPECIFIED: ICD-10-CM

## 2021-03-27 DIAGNOSIS — R09.89 OTHER SPECIFIED SYMPTOMS AND SIGNS INVOLVING THE CIRCULATORY AND RESPIRATORY SYSTEMS: ICD-10-CM

## 2021-03-27 DIAGNOSIS — R09.02 HYPOXEMIA: ICD-10-CM

## 2021-03-27 DIAGNOSIS — Z98.1 ARTHRODESIS STATUS: Chronic | ICD-10-CM

## 2021-03-27 DIAGNOSIS — E78.5 HYPERLIPIDEMIA, UNSPECIFIED: ICD-10-CM

## 2021-03-27 DIAGNOSIS — Z87.81 PERSONAL HISTORY OF (HEALED) TRAUMATIC FRACTURE: Chronic | ICD-10-CM

## 2021-03-27 DIAGNOSIS — Z98.1 ARTHRODESIS STATUS: ICD-10-CM

## 2021-03-27 LAB
ALBUMIN SERPL ELPH-MCNC: 4.1 G/DL — SIGNIFICANT CHANGE UP (ref 3.3–5.2)
ALP SERPL-CCNC: 124 U/L — HIGH (ref 40–120)
ALT FLD-CCNC: 55 U/L — HIGH
ANION GAP SERPL CALC-SCNC: 17 MMOL/L — SIGNIFICANT CHANGE UP (ref 5–17)
APTT BLD: 30.1 SEC — SIGNIFICANT CHANGE UP (ref 27.5–35.5)
AST SERPL-CCNC: 49 U/L — HIGH
BASE EXCESS BLDV CALC-SCNC: 0.1 MMOL/L — SIGNIFICANT CHANGE UP (ref -2–2)
BASOPHILS # BLD AUTO: 0.07 K/UL — SIGNIFICANT CHANGE UP (ref 0–0.2)
BASOPHILS NFR BLD AUTO: 0.4 % — SIGNIFICANT CHANGE UP (ref 0–2)
BILIRUB SERPL-MCNC: 0.5 MG/DL — SIGNIFICANT CHANGE UP (ref 0.4–2)
BLD GP AB SCN SERPL QL: SIGNIFICANT CHANGE UP
BUN SERPL-MCNC: 19 MG/DL — SIGNIFICANT CHANGE UP (ref 8–20)
CA-I SERPL-SCNC: 1.23 MMOL/L — SIGNIFICANT CHANGE UP (ref 1.15–1.33)
CALCIUM SERPL-MCNC: 9.6 MG/DL — SIGNIFICANT CHANGE UP (ref 8.6–10.2)
CHLORIDE BLDV-SCNC: 106 MMOL/L — SIGNIFICANT CHANGE UP (ref 98–107)
CHLORIDE SERPL-SCNC: 102 MMOL/L — SIGNIFICANT CHANGE UP (ref 98–107)
CO2 SERPL-SCNC: 21 MMOL/L — LOW (ref 22–29)
CREAT SERPL-MCNC: 1.04 MG/DL — SIGNIFICANT CHANGE UP (ref 0.5–1.3)
EOSINOPHIL # BLD AUTO: 0.2 K/UL — SIGNIFICANT CHANGE UP (ref 0–0.5)
EOSINOPHIL NFR BLD AUTO: 1.1 % — SIGNIFICANT CHANGE UP (ref 0–6)
GAS PNL BLDA: SIGNIFICANT CHANGE UP
GAS PNL BLDV: 143 MMOL/L — SIGNIFICANT CHANGE UP (ref 135–145)
GAS PNL BLDV: SIGNIFICANT CHANGE UP
GAS PNL BLDV: SIGNIFICANT CHANGE UP
GLUCOSE BLDV-MCNC: 144 MG/DL — HIGH (ref 70–99)
GLUCOSE SERPL-MCNC: 130 MG/DL — HIGH (ref 70–99)
HCO3 BLDV-SCNC: 24 MMOL/L — SIGNIFICANT CHANGE UP (ref 21–29)
HCT VFR BLD CALC: 43.6 % — SIGNIFICANT CHANGE UP (ref 39–50)
HCT VFR BLDA CALC: 45 — SIGNIFICANT CHANGE UP (ref 39–50)
HGB BLD CALC-MCNC: 14.7 G/DL — SIGNIFICANT CHANGE UP (ref 13–17)
HGB BLD-MCNC: 15.2 G/DL — SIGNIFICANT CHANGE UP (ref 13–17)
IMM GRANULOCYTES NFR BLD AUTO: 0.4 % — SIGNIFICANT CHANGE UP (ref 0–1.5)
INR BLD: 1.06 RATIO — SIGNIFICANT CHANGE UP (ref 0.88–1.16)
LACTATE BLDV-MCNC: 1.8 MMOL/L — SIGNIFICANT CHANGE UP (ref 0.5–2)
LYMPHOCYTES # BLD AUTO: 20.3 % — SIGNIFICANT CHANGE UP (ref 13–44)
LYMPHOCYTES # BLD AUTO: 3.8 K/UL — HIGH (ref 1–3.3)
MAGNESIUM SERPL-MCNC: 2 MG/DL — SIGNIFICANT CHANGE UP (ref 1.8–2.6)
MCHC RBC-ENTMCNC: 29.2 PG — SIGNIFICANT CHANGE UP (ref 27–34)
MCHC RBC-ENTMCNC: 34.9 GM/DL — SIGNIFICANT CHANGE UP (ref 32–36)
MCV RBC AUTO: 83.8 FL — SIGNIFICANT CHANGE UP (ref 80–100)
MONOCYTES # BLD AUTO: 1.17 K/UL — HIGH (ref 0–0.9)
MONOCYTES NFR BLD AUTO: 6.3 % — SIGNIFICANT CHANGE UP (ref 2–14)
NEUTROPHILS # BLD AUTO: 13.39 K/UL — HIGH (ref 1.8–7.4)
NEUTROPHILS NFR BLD AUTO: 71.5 % — SIGNIFICANT CHANGE UP (ref 43–77)
NT-PROBNP SERPL-SCNC: 1611 PG/ML — HIGH (ref 0–300)
NT-PROBNP SERPL-SCNC: 2181 PG/ML — HIGH (ref 0–300)
OTHER CELLS CSF MANUAL: 11 ML/DL — LOW (ref 18–22)
PCO2 BLDV: 58 MMHG — HIGH (ref 42–55)
PH BLDV: 7.29 — LOW (ref 7.32–7.43)
PLATELET # BLD AUTO: 304 K/UL — SIGNIFICANT CHANGE UP (ref 150–400)
PO2 BLDV: 34 MMHG — SIGNIFICANT CHANGE UP (ref 25–45)
POTASSIUM BLDV-SCNC: 5.1 MMOL/L — HIGH (ref 3.4–4.5)
POTASSIUM SERPL-MCNC: 3.7 MMOL/L — SIGNIFICANT CHANGE UP (ref 3.5–5.3)
POTASSIUM SERPL-SCNC: 3.7 MMOL/L — SIGNIFICANT CHANGE UP (ref 3.5–5.3)
PROT SERPL-MCNC: 7.6 G/DL — SIGNIFICANT CHANGE UP (ref 6.6–8.7)
PROTHROM AB SERPL-ACNC: 12.3 SEC — SIGNIFICANT CHANGE UP (ref 10.6–13.6)
RBC # BLD: 5.2 M/UL — SIGNIFICANT CHANGE UP (ref 4.2–5.8)
RBC # FLD: 13.2 % — SIGNIFICANT CHANGE UP (ref 10.3–14.5)
SAO2 % BLDV: 56 % — SIGNIFICANT CHANGE UP
SARS-COV-2 RNA SPEC QL NAA+PROBE: SIGNIFICANT CHANGE UP
SODIUM SERPL-SCNC: 140 MMOL/L — SIGNIFICANT CHANGE UP (ref 135–145)
TROPONIN T SERPL-MCNC: 0.03 NG/ML — SIGNIFICANT CHANGE UP (ref 0–0.06)
TROPONIN T SERPL-MCNC: 0.16 NG/ML — HIGH (ref 0–0.06)
WBC # BLD: 18.71 K/UL — HIGH (ref 3.8–10.5)
WBC # FLD AUTO: 18.71 K/UL — HIGH (ref 3.8–10.5)

## 2021-03-27 PROCEDURE — 99243 OFF/OP CNSLTJ NEW/EST LOW 30: CPT

## 2021-03-27 PROCEDURE — 71275 CT ANGIOGRAPHY CHEST: CPT | Mod: 26,MG

## 2021-03-27 PROCEDURE — 93010 ELECTROCARDIOGRAM REPORT: CPT | Mod: 76

## 2021-03-27 PROCEDURE — 99291 CRITICAL CARE FIRST HOUR: CPT

## 2021-03-27 PROCEDURE — 93970 EXTREMITY STUDY: CPT | Mod: 26

## 2021-03-27 PROCEDURE — G1004: CPT

## 2021-03-27 PROCEDURE — 72125 CT NECK SPINE W/O DYE: CPT | Mod: 26,MA

## 2021-03-27 PROCEDURE — 99223 1ST HOSP IP/OBS HIGH 75: CPT

## 2021-03-27 RX ORDER — ASPIRIN/CALCIUM CARB/MAGNESIUM 324 MG
162 TABLET ORAL ONCE
Refills: 0 | Status: COMPLETED | OUTPATIENT
Start: 2021-03-27 | End: 2021-03-27

## 2021-03-27 RX ORDER — HEPARIN SODIUM 5000 [USP'U]/ML
5000 INJECTION INTRAVENOUS; SUBCUTANEOUS EVERY 6 HOURS
Refills: 0 | Status: DISCONTINUED | OUTPATIENT
Start: 2021-03-27 | End: 2021-03-27

## 2021-03-27 RX ORDER — HEPARIN SODIUM 5000 [USP'U]/ML
10000 INJECTION INTRAVENOUS; SUBCUTANEOUS ONCE
Refills: 0 | Status: DISCONTINUED | OUTPATIENT
Start: 2021-03-27 | End: 2021-03-27

## 2021-03-27 RX ORDER — HEPARIN SODIUM 5000 [USP'U]/ML
INJECTION INTRAVENOUS; SUBCUTANEOUS
Qty: 25000 | Refills: 0 | Status: DISCONTINUED | OUTPATIENT
Start: 2021-03-27 | End: 2021-03-27

## 2021-03-27 RX ORDER — ATORVASTATIN CALCIUM 80 MG/1
20 TABLET, FILM COATED ORAL AT BEDTIME
Refills: 0 | Status: DISCONTINUED | OUTPATIENT
Start: 2021-03-27 | End: 2021-03-30

## 2021-03-27 RX ORDER — HEPARIN SODIUM 5000 [USP'U]/ML
10000 INJECTION INTRAVENOUS; SUBCUTANEOUS ONCE
Refills: 0 | Status: COMPLETED | OUTPATIENT
Start: 2021-03-27 | End: 2021-03-27

## 2021-03-27 RX ORDER — HEPARIN SODIUM 5000 [USP'U]/ML
5000 INJECTION INTRAVENOUS; SUBCUTANEOUS EVERY 6 HOURS
Refills: 0 | Status: DISCONTINUED | OUTPATIENT
Start: 2021-03-27 | End: 2021-03-30

## 2021-03-27 RX ORDER — HEPARIN SODIUM 5000 [USP'U]/ML
10000 INJECTION INTRAVENOUS; SUBCUTANEOUS EVERY 6 HOURS
Refills: 0 | Status: DISCONTINUED | OUTPATIENT
Start: 2021-03-27 | End: 2021-03-27

## 2021-03-27 RX ORDER — HEPARIN SODIUM 5000 [USP'U]/ML
10000 INJECTION INTRAVENOUS; SUBCUTANEOUS EVERY 6 HOURS
Refills: 0 | Status: DISCONTINUED | OUTPATIENT
Start: 2021-03-27 | End: 2021-03-30

## 2021-03-27 RX ORDER — HEPARIN SODIUM 5000 [USP'U]/ML
INJECTION INTRAVENOUS; SUBCUTANEOUS
Qty: 25000 | Refills: 0 | Status: DISCONTINUED | OUTPATIENT
Start: 2021-03-27 | End: 2021-03-30

## 2021-03-27 RX ADMIN — HEPARIN SODIUM 2200 UNIT(S)/HR: 5000 INJECTION INTRAVENOUS; SUBCUTANEOUS at 17:53

## 2021-03-27 RX ADMIN — ATORVASTATIN CALCIUM 20 MILLIGRAM(S): 80 TABLET, FILM COATED ORAL at 20:59

## 2021-03-27 RX ADMIN — HEPARIN SODIUM 10000 UNIT(S): 5000 INJECTION INTRAVENOUS; SUBCUTANEOUS at 17:54

## 2021-03-27 RX ADMIN — Medication 162 MILLIGRAM(S): at 17:17

## 2021-03-27 NOTE — CONSULT NOTE ADULT - ASSESSMENT
This is a 47 y/o male with PMH of Obesity, HLD , C6-C7 fractures, OA and cervical radiculopathy s/p a Cervical corpectomy on 3/8/2021 who presents to the hospital with a 2 day hx of sob and tachycardia.  He has been using his Apple watch and observing the his heart rate has been elevated with ambulation with sob. Today he was sitting on  became very sob and was pre syncopal Ems was called.  Presented to the ER  with sinus tachycardia and 02 sat was 90 on oxygen  CT angio revealed Extensive bilateral pulmonary emboli with suggestion of right heart strain. Possible right lower lobe pulmonary infarct  Trop 0.3  [id number] and lactate is pending.   Started on heparin . Echo is pending as well as venous dopplers      PULMONARY EMBOLISM WITH RIGHT HEART STRAIN -> TROP NORMAL  : 1611 pg/mL  iv HEPARIN PER Protocol  Continue supplemental oxygen  keep on continuous pulse ox  Obtain Echo and venous doppler    s[ Cervoca; Corpectomy      This is a 47 y/o male with PMH of Obesity, HLD , C6-C7 fractures, OA and cervical radiculopathy s/p a Cervical corpectomy on 3/8/2021 who presents to the hospital with a 2 day hx of sob and tachycardia.  He has been using his Apple watch and observing the his heart rate has been elevated with ambulation with sob. Today he was sitting on  became very sob and was pre syncopal Ems was called.  Presented to the ER  with sinus tachycardia and 02 sat was 90 on oxygen  CT angio revealed Extensive bilateral pulmonary emboli with suggestion of right heart strain. Possible right lower lobe pulmonary infarct  Trop 0.3  [id number] and lactate is pending.   Started on heparin . Echo is pending as well as venous dopplers    PULMONARY EMBOLISM WITH RIGHT HEART STRAIN -> TROP NORMAL : 1611 pg/mL -> Provoked by recent surgery  iv HEPARIN per Protocol  Continue supplemental oxygen  keep on continuous pulse ox  Obtain Echo and venous doppler    S/P CERVICAL CORPECTOMY  Incision healed  tolerating heparin

## 2021-03-27 NOTE — H&P ADULT - PROBLEM SELECTOR PLAN 5
I did speak to ortho PA on call who will notify orthopedic surgeon Dr. De La Torre about pt's PE and admitted at [facility].

## 2021-03-27 NOTE — ED PROVIDER NOTE - PROGRESS NOTE DETAILS
Given the significant and immediate threats to this patient based on initial presentation, the benefits of emergency contrast-enhanced CT imaging without obtaining GFR/creatinine serum level results or informed consent greatly outweigh the potential risk of harm due to contrast-induced nephropathy. - Fredis Montgomery, PGY-2 Dontrell Cunha, Resident: Pt feels better, currently on 3L NC, tachycardic to 110's sinus tachy. Palpitations resolved. Evaluated by cardiology and MICU, recommendations include admission to telemetry level of care. Spoke to hospitalist agrees with admission. radiologist called regarding +DVT study, pt already being treated for PE, admitting team informed

## 2021-03-27 NOTE — ED ADULT NURSE NOTE - OBJECTIVE STATEMENT
Pt c/o SOB and generalized weakness, pt 2 weeks post op cervical fusion surgery, states developed dyspnea last night and its been getting increasingly worse ever since.

## 2021-03-27 NOTE — CONSULT NOTE ADULT - ATTENDING COMMENTS
49 y/o male with   Intermediate high risk PE   Severely dilated RV with RV dysfunction     Plan   Mechanical Thrombectomy using INARI

## 2021-03-27 NOTE — ED PROVIDER NOTE - CARE PLAN
Principal Discharge DX:	Acute pulmonary embolism, unspecified pulmonary embolism type, unspecified whether acute cor pulmonale present  Secondary Diagnosis:	Hypoxia

## 2021-03-27 NOTE — H&P ADULT - ASSESSMENT
In summary 47 y/o obese male with h/o HLD, obese, s/p anterior and posterior cervical fusion C5-T1 with C6-7 corpectomy/cage with Dr De La Torre on 3/8/21 presents with c/o SOB and FIELD x 1 day. Pt states symptoms started yesterday, he noted his HR on his smart watch was in the 150s since last night and he was having severe SOB with exertion. Pt denies CP but states this am he had a near syncopal episode but never had LOC. no abd. pain, no n/v/d. no fever, no chills, no sig. cough, no hemoptysis, no melena, no hematemesis as per pt. pt. 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 got his 2nd dose of covid-19 vaccine ( pfizer ) about 7 days after his surgery and had some body pains / chills and fever x 1 day and then got better. Pt. also stated that about  4 days ago he had mild discomfort in both his legs x 1 day and then got better. pt. has been evaluated by ICU team in the ER and was not accepted to icu. pt's first trop is 0.03 and 2nd is 0.16. pt. stated that o2 is helping, is on 3 L NC and sat is 98 % range. pt's 2nd trop is positive which is discussed with Dr. Avila who recommends to continue heparin drip and no need to repeat further troponin as long as hemodynamically stable and no cp. pt. will be evaluated by him in the am for further plan for PE or Thrombectomy. pt. will be admitted to step down for extensive B/L PE.  In summary 45 y/o obese male with h/o HLD, obese, s/p anterior and posterior cervical fusion C5-T1 with C6-7 corpectomy/cage with Dr De La Torre on 3/8/21 presents with c/o SOB and FIELD x 1 day. Pt states symptoms started yesterday, he noted his HR on his smart watch was in the 150s since last night and he was having severe SOB with exertion. Pt denies CP but states this am he had a near syncopal episode but never had LOC. no abd. pain, no n/v/d. no fever, no chills, no sig. cough, no hemoptysis, no melena, no hematemesis as per pt. pt. 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 got his 2nd dose of covid-19 vaccine ( pfizer ) about 7 days after his surgery and had some body pains / chills and fever x 1 day and then got better. Pt. also stated that about  4 days ago he had mild discomfort in both his legs x 1 day and then got better. pt. has been evaluated by ICU team in the ER and was not accepted to icu. pt's first trop is 0.03 and 2nd is 0.16. pt. stated that o2 is helping, is on 3 L NC and sat is 98 % range. pt's 2nd trop is positive which is discussed with cardiologist Dr. Avila who recommends to continue heparin drip and no need to repeat further troponin as long as hemodynamically stable and no cp. pt. will be evaluated by him in the am for further plan for PE or Thrombectomy. pt. will be admitted to step down for extensive B/L PE.

## 2021-03-27 NOTE — H&P ADULT - NSHPPHYSICALEXAM_GEN_ALL_CORE
General: Obese male lying in bed not in distress.   HEENT: AT, NC. PERRL. intact EOM. no throat erythema or exudate.   Neck: supple. no JVD. scar over posterior cervical spine area is dry and appears healed well.  Chest: air entry is fair bilaterally, no inter costal retractions.   Heart: S1,S2 , tachy, no heart murmur. no leg edema.   Abdomen: soft. non-tender , obese, + BS.  Ext: no calf tenderness on either side, ROM of all ext. intact, distal pulses 2 +.   Neuro: AAO x3. no focal weakness. no speech disorder, Cns ii to xii intact. m /r/s intact.   Skin: no rash noted, no pallor, no diaphoresis.   psych : normal affect, no agitation, no si/hi.

## 2021-03-27 NOTE — H&P ADULT - HISTORY OF PRESENT ILLNESS
47 y/o obese male with h/o HLD, obese, s/p anterior and posterior cervical fusion C5-T1 with C6-7 corpectomy/cage with Dr De La Torre on 3/8/21 presents with c/o SOB and FIELD x 1 day.   Pt states symptoms started yesterday, he noted his HR on his smart watch was in the 150s since last night and he was having severe SOB with exertion. Pt denies CP but states this am he had a near syncopal episode but never had LOC. no abd. pain, no n/v/d. no fever, no chills, no sig. cough, no hemoptysis, no melena, no hematemesis as per pt. pt. 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 got his 2nd dose of covid-19 vaccine about 7 days after his surgery and had some body pains / chills and fever x 1 day and then got better. Pt. also stated that about 1 week ago he had mild pain in both his legs x 1 day and then got better. pt. has been evaluated by ICU team in the ER and was not accepted to icu. pt's first trop is 0.03 and 2nd is 0.16. pt. stated that o2 is helping, is on 3 L NC and sat is 98 % range. pt's 2nd trop is positive which is discussed with Dr. Murcia who recommends to continue heparin drip and no need to repeat further troponin as long as hemodynamically stable and no cp. pt. will be evaluated by him in the am and further plan for PE / Thrombectomy as per   47 y/o obese male with h/o HLD, obese, s/p anterior and posterior cervical fusion C5-T1 with C6-7 corpectomy/cage with Dr De La Torre on 3/8/21 presents with c/o SOB and FIELD x 1 day. Pt states symptoms started yesterday, he noted his HR on his smart watch was in the 150s since last night and he was having severe SOB with exertion, better at rest. Pt denies CP but states this am he had a near syncopal episode but never had LOC. no abd. pain, no n/v/d. no fever, no chills, no sig. cough, no hemoptysis, no melena, no hematemesis as per pt. pt. 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 got his 2nd dose of covid-19 vaccine ( pfizer ) about 7 days after his surgery and had some body pains / chills and fever x 1 day and then got better. Pt. also stated that about  4 days ago he had mild discomfort in both his legs x 1 day and then got better. pt. has been evaluated by ICU team in the ER and was not accepted to icu. pt's first trop is 0.03 and 2nd is 0.16. pt. stated that o2 is helping, is on 3 L NC and sat is 98 % range. pt's 2nd trop is positive which is discussed with Dr. Avila who recommends to continue heparin drip and no need to repeat further troponin as long as hemodynamically stable and no cp. pt. will be evaluated by him in the am for further plan for PE or Thrombectomy. pt. reports no prior h/o blood clots in him or family.  47 y/o obese male with h/o HLD, obese, s/p anterior and posterior cervical fusion C5-T1 with C6-7 corpectomy/cage with Dr De La Torre on 3/8/21 presents with c/o SOB and FIELD x 1 day. Pt states symptoms started yesterday, he noted his HR on his smart watch was in the 150s since last night and he was having severe SOB with exertion, better at rest. Pt denies CP but states this am he had a near syncopal episode but never had LOC. no abd. pain, no n/v/d. no fever, no chills, no sig. cough, no hemoptysis, no melena, no hematemesis as per pt. pt. 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 got his 2nd dose of covid-19 vaccine ( pfizer ) about 7 days after his surgery and had some body pains / chills and fever x 1 day and then got better. Pt. also stated that about  4 days ago he had mild discomfort in both his legs x 1 day and then got better. pt. has been evaluated by ICU team in the ER and was not accepted to icu. pt's first trop is 0.03 and 2nd is 0.16. pt. stated that o2 is helping, is on 3 L NC and sat is 98 % range. pt's 2nd trop is positive which is discussed with cardiologist Dr. Avila who recommends to continue heparin drip and no need to repeat further troponin as long as hemodynamically stable and no cp. pt. will be evaluated by him in the am for further plan for PE or Thrombectomy. pt. reports no prior h/o blood clots in him or family.

## 2021-03-27 NOTE — H&P ADULT - PROBLEM SELECTOR PLAN 7
pt's lower ext venous doppler came back and showed showed : Occlusive thrombus in the right posterior tibial vein, left posterior tibial vein and left peroneal vein. continue heparin drip.

## 2021-03-27 NOTE — H&P ADULT - PROBLEM SELECTOR PLAN 1
Pt. will be admitted to step down unit, o2 support, close monitoring of hemodynamic status, continue heparin drip. follow echo and B/L lower ext . venous doppler, trigger for PE ? got 2nd dose of pfizer vaccine 7 days after his surgery. discussed with cardiologist dr. Avila and ICu team.  pt. will be evaluated by dr. Avila in the am for further plan for PE or Thrombectomy.

## 2021-03-27 NOTE — ED PROVIDER NOTE - CADM POA PRESS ULCER
Lab results faxed to Dr Salvador's office
Patient tolerated lab draw and port flush well. Therapy plan reviewed with patient. Verbalizes understanding. Declines copy of AVS and any medication information, verbalizes good knowledge of current plan, and has no signs or symptoms to report at this time. Next appointment made.
No

## 2021-03-27 NOTE — ED PROVIDER NOTE - OBJECTIVE STATEMENT
47 y/o M pt with no pmhx, recently post op from cervical fusion surgery approx 2 week prior presenting today with c/o dypsnea x1 day. Pt states that yesterday evening he developed significnat manriquez, was unable to ambulate more than 5-10 steps without developing sob. 2 nights ago was feeling at his baseline. This morning reports that manriquez worsened to the point that he was sob at rest, also developed slight midsternal chest pain. Pt uncomfortable appearing, and diaphoretic on exam. Pt denies any fevers, n/v/d, abdominal pain, dysuria, headache, cough, congestion, sore throat, neck pain, back pain, numbness, tingling, syncope, or other complaint. 45 y/o M pt with no pmhx, recently post op from cervical fusion surgery approx 3 weeks prior presenting today with c/o dypsnea x1 day. Pt states that yesterday evening he developed significnat manriquez, was unable to ambulate more than 5-10 steps without developing sob. 2 nights ago was feeling at his baseline. This morning reports that manriquez worsened to the point that he was sob at rest, also developed slight midsternal chest pain. Pt uncomfortable appearing, and diaphoretic on exam. Pt denies any fevers, n/v/d, abdominal pain, dysuria, headache, cough, congestion, sore throat, neck pain, back pain, numbness, tingling, syncope, or other complaint.

## 2021-03-27 NOTE — CONSULT NOTE ADULT - SUBJECTIVE AND OBJECTIVE BOX
Pt is a 46 YOM h/o HLD, obese, s/p anterior and posterior cervical fusion C5-T1 with C6-7 corpectomy/cage with Dr De La Torre on 3/8/21 presents with c/o SOB and FIELD.  Pt states symptoms started yesterday, he noted his HR on his smart watch was in the 150s since last night and he was having SOB with severe SOB with exertion. Pt has elevated BNP but neg trop thus far. Pt denies CP but states this am he had a near syncopal episode but never had LOC.  In ER pt is comfortable on 3L NC with 02 sat of 90% at present.  His BP is 127/94.  Case d/w Dr Maria and Dr Avila at this time pt is on Heparin gtt which will be continued and pt can go to telemetry.  Pt may be candidate for thrombectomy and Dr Avila will evaluate in am.  At this time pt is oxygenating well and and is hemodynamically stable and does not require ICU level of care.  If pt condition worsens please feel free to reconsult.  Patient is a 46y old  Male who presents with a chief complaint of Pulmonary Embolism (27 Mar 2021 18:45)      PAST MEDICAL & SURGICAL HISTORY:  No pertinent past medical history    H/O cervical fracture        Review of Systems:  CONSTITUTIONAL: No fever, chills, or fatigue  EYES: No eye pain, visual disturbances, or discharge  ENMT:  No difficulty hearing, tinnitus, vertigo; No sinus or throat pain  NECK: No pain or stiffness  RESPIRATORY: pos shortness of breath  CARDIOVASCULAR: No chest pain, palpitations, dizziness, or leg swelling  GASTROINTESTINAL: No abdominal or epigastric pain. No nausea, vomiting, or hematemesis; No diarrhea or constipation. No melena or hematochezia.  GENITOURINARY: No dysuria, frequency, hematuria, or incontinence  NEUROLOGICAL: No headaches, memory loss, loss of strength, numbness, or tremors  SKIN: No itching, burning, rashes, or lesions   MUSCULOSKELETAL: No joint pain or swelling; No muscle, back, or extremity pain  PSYCHIATRIC: No depression, anxiety, mood swings, or difficulty sleeping      Medications:            heparin   Injectable 39131 Unit(s) IV Push every 6 hours PRN  heparin   Injectable 5000 Unit(s) IV Push every 6 hours PRN  heparin  Infusion.  Unit(s)/Hr IV Continuous <Continuous>        atorvastatin 20 milliGRAM(s) Oral at bedtime                  ICU Vital Signs Last 24 Hrs  T(C): 37.2 (27 Mar 2021 19:21), Max: 37.2 (27 Mar 2021 19:21)  T(F): 99 (27 Mar 2021 19:21), Max: 99 (27 Mar 2021 19:21)  HR: 125 (27 Mar 2021 19:11) (118 - 125)  BP: 138/110 (27 Mar 2021 19:11) (109/77 - 138/110)  BP(mean): --  ABP: --  ABP(mean): --  RR: 22 (27 Mar 2021 19:11) (22 - 22)  SpO2: 90% (27 Mar 2021 19:11) (90% - 90%)      ABG - ( 27 Mar 2021 18:26 )  pH, Arterial: 7.42  pH, Blood: x     /  pCO2: 35    /  pO2: 127   / HCO3: 24    / Base Excess: -1.1  /  SaO2: 100                       LABS:                        15.2   18.71 )-----------( 304      ( 27 Mar 2021 16:47 )             43.6     03-27    140  |  102  |  19.0  ----------------------------<  130<H>  3.7   |  21.0<L>  |  1.04    Ca    9.6      27 Mar 2021 16:47  Mg     2.0     03-27    TPro  7.6  /  Alb  4.1  /  TBili  0.5  /  DBili  x   /  AST  49<H>  /  ALT  55<H>  /  AlkPhos  124<H>  03-27      CARDIAC MARKERS ( 27 Mar 2021 19:01 )  x     / 0.16 ng/mL / x     / x     / x      CARDIAC MARKERS ( 27 Mar 2021 16:47 )  x     / 0.03 ng/mL / x     / x     / x          CAPILLARY BLOOD GLUCOSE        PT/INR - ( 27 Mar 2021 16:48 )   PT: 12.3 sec;   INR: 1.06 ratio         PTT - ( 27 Mar 2021 16:48 )  PTT:30.1 sec    CULTURES:      Physical Examination:    General: No acute distress.  Alert, oriented, interactive, nonfocal    HEENT: Pupils equal, reactive to light.  Symmetric.    PULM: diminished at bases bilaterally, no significant sputum production    CVS: sinus tachycardia, no murmurs, rubs, or gallops    ABD: Soft, nondistended, nontender, normoactive bowel sounds, no masses    EXT: No edema, nontender    SKIN: Warm and well perfused, no rashes noted.    RADIOLOGY: images reviewed    CRITICAL CARE TIME SPENT:   
HPI:  This is a 49 y/o male with PMH of Obesity, HLD , C6-C7 fractures, OA and cervical radiculopathy s/p a Cervical corpectomy on 3/8/2021 who presents to the hospital with a 2 day hx of sob and tachycardia.  He has been using his Apple watch and observing the his heart rate has been elevated with ambulation with sob. Today he was sitting on  became very sob and was pre syncopal Ems was called.  Presented to the ER  with sinus tachycardia and 02 sat was 90 on oxygen  CT angio revealed Extensive bilateral pulmonary emboli with suggestion of right heart strain. Possible right lower lobe pulmonary infarct  Trop 0.3  [id number] and lactate is pending.   Started on heparin . Echo is pending as well as venous dopplers    ROS  No fever of chills no weight loss  CV + sob + palpitations no chest discomfort  RESP no cough no mucus production  Gi: no abd pain, no hx of gi bleeding  Neuro  no headache      PMH  Obesity  OA  HLD  H/O cervical fracture    PSH   Cervical corpectomy    ALLERGIES:  PCN      MEDICATIONS  (STANDING):  heparin  Infusion.  Unit(s)/Hr (22 mL/Hr) IV Continuous <Continuous>    MEDICATIONS  (PRN):  heparin   Injectable 04089 Unit(s) IV Push every 6 hours PRN For aPTT less than 40  heparin   Injectable 5000 Unit(s) IV Push every 6 hours PRN For aPTT between 40 - 57    FAMILY HISTORY:  Family history of diabetes mellitus (DM)    SOCIAL HISTORY:  CIGARETTES:  Former smoker d/akanksha 5 years ago  ALCOHOL:  No etoh  Works as a    and has 3 children    Vital Signs Last 24 Hrs  T(C): 36.4 (27 Mar 2021 15:54), Max: 36.4 (27 Mar 2021 15:54)  T(F): 97.5 (27 Mar 2021 15:54), Max: 97.5 (27 Mar 2021 15:54)  HR: 118 (27 Mar 2021 15:18) (118 - 118)  BP: 109/77 (27 Mar 2021 15:18) (109/77 - 109/77)  BP(mean): --  RR: 22 (27 Mar 2021 15:18) (22 - 22)  SpO2: 90% (27 Mar 2021 15:18) (90% - 90%)    Daily Height in cm: 180.34 (27 Mar 2021 15:18)    Daily   I&O's Detail    PHYSICAL EXAM:  Appearance: Normal, well nourished	Lyine in bed on 3 liters  o2 sat 99%but is dyspenic with exertion  HEENT:   Normal oral mucosa, PERRL, EOMI, sclera non-icteric	  + scar ant and post healed no s/s of infections  Lymphatic: No cervical lymphadenopathy  Cardiovascular: Normal S1 S2, No JVD, No cardiac murmurs, No carotid bruits, No peripheral edema  Respiratory: Lungs clear to auscultation	  Psychiatry: A & O x 3, Mood & affect appropriate  Gastrointestinal:  Soft, Non-tender, + BS, no bruits	  Skin: No rashes, No ecchymoses, No cyanosis  Neurologic: Grossly non-focal with full strength in all four extremities  Extremities: Normal range of motion, No clubbing, cyanosis or edema  Vascular: Peripheral pulses palpable 2+ bilaterally      INTERPRETATION OF TELEMETRY:  Sinus tach    ECG:  Sinus tach  non specific st changes    LABS:                        15.2   18.71 )-----------( 304      ( 27 Mar 2021 16:47 )             43.6     03-27    140  |  102  |  19.0  ----------------------------<  130<H>  3.7   |  21.0<L>  |  1.04    Ca    9.6      27 Mar 2021 16:47  Mg     2.0     03-27    TPro  7.6  /  Alb  4.1  /  TBili  0.5  /  DBili  x   /  AST  49<H>  /  ALT  55<H>  /  AlkPhos  124<H>  03-27    CARDIAC MARKERS ( 27 Mar 2021 16:47 )  x     / 0.03 ng/mL / x     / x     / x          PT/INR - ( 27 Mar 2021 16:48 )   PT: 12.3 sec;   INR: 1.06 ratio      PTT - ( 27 Mar 2021 16:48 )  PTT:30.1 sec    I&O's Summary    BNPSerum Pro-Brain Natriuretic Peptide: 1611 pg/mL (03-27 @ 16:47)    RADIOLOGY & ADDITIONAL STUDIES:  FINDINGS:    LUNGS AND AIRWAYS: Patent central airways.   Peripheral are right lower lobe opacity, possible infarct. 3 mm left lower lobe nodule (5:93). Bibasilar mosaic attenuation which may be related to small vessel disease.  PLEURA: No pleural effusion.  MEDIASTINUM AND DAT: No lymphadenopathy.  VESSELS: Pulmonary emboli in distal left lower lobe pulmonary artery and left lower lobe segmental branches as well as lingular branches, distal right main pulmonary artery extending to all lobar branches with segmental branch involvement. Main pulmonary artery dilated to 3.6 cm suggesting pulmonary arterial hypertension.  HEART: Heart size is normal. No pericardial effusion. Suggestion right heart strain withenlargement of the right ventricle and straightening of the interventricular septum.  CHEST WALL AND LOWER NECK: Within normal limits.  VISUALIZED UPPER ABDOMEN: Left renal cyst  BONES: Surgical hardware cervical spine.    IMPRESSION:  Extensive bilateral pulmonary emboli with suggestion of right heart strain.  Possible right lower lobe pulmonary infarct

## 2021-03-27 NOTE — ED PROVIDER NOTE - PHYSICAL EXAMINATION
Gen: mild acute distress, diaphoretic, appears uncomfortable  Head: normocephalic, atraumatic  Lung: CTAB, no respiratory distress, no wheezing, rales, rhonchi, satting 88% on ra, improved to 98% on 2Lnc  CV: normal s1/s2, tachycardic, regular rhythm  Abd: soft, non-tender, non-distended  MSK: No edema, no visible deformities, full range of motion in all 4 extremities  Neuro: No focal neurologic deficits  Skin: No rash   Psych: normal affect

## 2021-03-27 NOTE — ED PROVIDER NOTE - ATTENDING CONTRIBUTION TO CARE
AJM: pt presenting with sob tachycardia and recent post op. concern for PE. stat labs and cta chest ordered. BP stable. + hypoxia responding well to O2. covid test as well. will need cards and micu eval

## 2021-03-27 NOTE — ED PROVIDER NOTE - CLINICAL SUMMARY MEDICAL DECISION MAKING FREE TEXT BOX
Pt with no pmhx, recently surgery 2 weeks prior, presenting with onset of worsening manriquez and chest pain yesterday evening, worsened today. Pt uncomfortable appearing, dyspnenic, tachycardic and hypoxic on exam. Given recent surgery will priority CT for PE eval. WILl check labs, ekg, trop, cxr, reeval.

## 2021-03-27 NOTE — H&P ADULT - PROBLEM SELECTOR PLAN 6
likely reactive, pt. has no fever, no pneumonia on ct chest, neck wound intact , dry and clean no warmth, no erythema, will hold antibiotics at this point, blood cultures ordered, follow am labs and follow clinically.

## 2021-03-27 NOTE — CONSULT NOTE ADULT - PROBLEM SELECTOR RECOMMENDATION 9
Pt currently on Heparin gtt   monitor PTT  Cardio following, Dr Avila will see pt to eval for thrombectomy  At this time pt does not require ICU but if pt condition changes please recall and we will re-evaluate.  Recommend Telemetry with

## 2021-03-28 DIAGNOSIS — I82.409 ACUTE EMBOLISM AND THROMBOSIS OF UNSPECIFIED DEEP VEINS OF UNSPECIFIED LOWER EXTREMITY: ICD-10-CM

## 2021-03-28 DIAGNOSIS — D72.829 ELEVATED WHITE BLOOD CELL COUNT, UNSPECIFIED: ICD-10-CM

## 2021-03-28 LAB
ALBUMIN SERPL ELPH-MCNC: 3.8 G/DL — SIGNIFICANT CHANGE UP (ref 3.3–5.2)
ALP SERPL-CCNC: 107 U/L — SIGNIFICANT CHANGE UP (ref 40–120)
ALT FLD-CCNC: 39 U/L — SIGNIFICANT CHANGE UP
ANION GAP SERPL CALC-SCNC: 14 MMOL/L — SIGNIFICANT CHANGE UP (ref 5–17)
APTT BLD: 103.6 SEC — HIGH (ref 27.5–35.5)
APTT BLD: 116 SEC — HIGH (ref 27.5–35.5)
APTT BLD: 74.2 SEC — HIGH (ref 27.5–35.5)
APTT BLD: 93.9 SEC — HIGH (ref 27.5–35.5)
AST SERPL-CCNC: 28 U/L — SIGNIFICANT CHANGE UP
BASOPHILS # BLD AUTO: 0.05 K/UL — SIGNIFICANT CHANGE UP (ref 0–0.2)
BASOPHILS NFR BLD AUTO: 0.4 % — SIGNIFICANT CHANGE UP (ref 0–2)
BILIRUB SERPL-MCNC: 0.5 MG/DL — SIGNIFICANT CHANGE UP (ref 0.4–2)
BUN SERPL-MCNC: 22 MG/DL — HIGH (ref 8–20)
CALCIUM SERPL-MCNC: 9.1 MG/DL — SIGNIFICANT CHANGE UP (ref 8.6–10.2)
CHLORIDE SERPL-SCNC: 104 MMOL/L — SIGNIFICANT CHANGE UP (ref 98–107)
CO2 SERPL-SCNC: 22 MMOL/L — SIGNIFICANT CHANGE UP (ref 22–29)
COVID-19 SPIKE DOMAIN AB INTERP: POSITIVE
COVID-19 SPIKE DOMAIN ANTIBODY RESULT: >250 U/ML — HIGH
CREAT SERPL-MCNC: 0.9 MG/DL — SIGNIFICANT CHANGE UP (ref 0.5–1.3)
EOSINOPHIL # BLD AUTO: 0.09 K/UL — SIGNIFICANT CHANGE UP (ref 0–0.5)
EOSINOPHIL NFR BLD AUTO: 0.7 % — SIGNIFICANT CHANGE UP (ref 0–6)
GLUCOSE SERPL-MCNC: 111 MG/DL — HIGH (ref 70–99)
HCT VFR BLD CALC: 38.9 % — LOW (ref 39–50)
HGB BLD-MCNC: 13.4 G/DL — SIGNIFICANT CHANGE UP (ref 13–17)
IMM GRANULOCYTES NFR BLD AUTO: 0.4 % — SIGNIFICANT CHANGE UP (ref 0–1.5)
LYMPHOCYTES # BLD AUTO: 2.9 K/UL — SIGNIFICANT CHANGE UP (ref 1–3.3)
LYMPHOCYTES # BLD AUTO: 23.4 % — SIGNIFICANT CHANGE UP (ref 13–44)
MCHC RBC-ENTMCNC: 29.4 PG — SIGNIFICANT CHANGE UP (ref 27–34)
MCHC RBC-ENTMCNC: 34.4 GM/DL — SIGNIFICANT CHANGE UP (ref 32–36)
MCV RBC AUTO: 85.3 FL — SIGNIFICANT CHANGE UP (ref 80–100)
MONOCYTES # BLD AUTO: 0.76 K/UL — SIGNIFICANT CHANGE UP (ref 0–0.9)
MONOCYTES NFR BLD AUTO: 6.1 % — SIGNIFICANT CHANGE UP (ref 2–14)
NEUTROPHILS # BLD AUTO: 8.56 K/UL — HIGH (ref 1.8–7.4)
NEUTROPHILS NFR BLD AUTO: 69 % — SIGNIFICANT CHANGE UP (ref 43–77)
NT-PROBNP SERPL-SCNC: 4640 PG/ML — HIGH (ref 0–300)
PLATELET # BLD AUTO: 242 K/UL — SIGNIFICANT CHANGE UP (ref 150–400)
POTASSIUM SERPL-MCNC: 4.4 MMOL/L — SIGNIFICANT CHANGE UP (ref 3.5–5.3)
POTASSIUM SERPL-SCNC: 4.4 MMOL/L — SIGNIFICANT CHANGE UP (ref 3.5–5.3)
PROT SERPL-MCNC: 7 G/DL — SIGNIFICANT CHANGE UP (ref 6.6–8.7)
RBC # BLD: 4.56 M/UL — SIGNIFICANT CHANGE UP (ref 4.2–5.8)
RBC # FLD: 13.2 % — SIGNIFICANT CHANGE UP (ref 10.3–14.5)
SARS-COV-2 IGG+IGM SERPL QL IA: >250 U/ML — HIGH
SARS-COV-2 IGG+IGM SERPL QL IA: POSITIVE
SODIUM SERPL-SCNC: 140 MMOL/L — SIGNIFICANT CHANGE UP (ref 135–145)
TROPONIN T SERPL-MCNC: 0.05 NG/ML — SIGNIFICANT CHANGE UP (ref 0–0.06)
WBC # BLD: 12.41 K/UL — HIGH (ref 3.8–10.5)
WBC # FLD AUTO: 12.41 K/UL — HIGH (ref 3.8–10.5)

## 2021-03-28 PROCEDURE — 99221 1ST HOSP IP/OBS SF/LOW 40: CPT | Mod: 24

## 2021-03-28 PROCEDURE — 93306 TTE W/DOPPLER COMPLETE: CPT | Mod: 26

## 2021-03-28 PROCEDURE — 99233 SBSQ HOSP IP/OBS HIGH 50: CPT

## 2021-03-28 RX ORDER — INFLUENZA VIRUS VACCINE 15; 15; 15; 15 UG/.5ML; UG/.5ML; UG/.5ML; UG/.5ML
0.5 SUSPENSION INTRAMUSCULAR ONCE
Refills: 0 | Status: DISCONTINUED | OUTPATIENT
Start: 2021-03-28 | End: 2021-03-30

## 2021-03-28 RX ADMIN — HEPARIN SODIUM 1600 UNIT(S)/HR: 5000 INJECTION INTRAVENOUS; SUBCUTANEOUS at 20:00

## 2021-03-28 RX ADMIN — HEPARIN SODIUM 1600 UNIT(S)/HR: 5000 INJECTION INTRAVENOUS; SUBCUTANEOUS at 06:51

## 2021-03-28 RX ADMIN — HEPARIN SODIUM 1900 UNIT(S)/HR: 5000 INJECTION INTRAVENOUS; SUBCUTANEOUS at 00:22

## 2021-03-28 RX ADMIN — ATORVASTATIN CALCIUM 20 MILLIGRAM(S): 80 TABLET, FILM COATED ORAL at 21:36

## 2021-03-28 RX ADMIN — HEPARIN SODIUM 1600 UNIT(S)/HR: 5000 INJECTION INTRAVENOUS; SUBCUTANEOUS at 13:31

## 2021-03-28 NOTE — PROGRESS NOTE ADULT - ASSESSMENT
Attending statement:  I have personally seen this patient, and formed a face to face diagnostic evaluation on this patient on this date.  I have reviewed the PA, NP and or Medical/PA student and/or Resident documentation and agree with the history, physical exam and plan of care except if noted otherwise.

## 2021-03-28 NOTE — PROGRESS NOTE ADULT - SUBJECTIVE AND OBJECTIVE BOX
Internal Medicine Hospitalist Progress Note    follow up for PE with DVT   pt is seen earlier , lying down in the bed , no distress looks comfortable   SOB is better , denies leg pain or CP      chart reviewed     ROS: as above, all remaining ROS are negative.       BACKGROUND:  MEDICATIONS  (STANDING):  atorvastatin 20 milliGRAM(s) Oral at bedtime  heparin  Infusion.  Unit(s)/Hr (22 mL/Hr) IV Continuous <Continuous>    MEDICATIONS  (PRN):  heparin   Injectable 04719 Unit(s) IV Push every 6 hours PRN For aPTT less than 40  heparin   Injectable 5000 Unit(s) IV Push every 6 hours PRN For aPTT between 40 - 57    Allergies    penicillin (Hives)    Intolerances            VITALS:  Vital Signs Last 24 Hrs  T(C): 36.8 (28 Mar 2021 09:10), Max: 37.2 (27 Mar 2021 19:21)  T(F): 98.3 (28 Mar 2021 09:10), Max: 99 (27 Mar 2021 19:21)  HR: 100 (28 Mar 2021 09:10) (100 - 125)  BP: 91/63 (28 Mar 2021 09:10) (91/63 - 138/110)  BP(mean): 81 (28 Mar 2021 04:37) (81 - 90)  RR: 22 (28 Mar 2021 09:10) (22 - 22)  SpO2: 97% (28 Mar 2021 09:10) (90% - 99%) Daily Height in cm: 180.34 (27 Mar 2021 15:18)    Daily   CAPILLARY BLOOD GLUCOSE        I&O's Summary      PHYSICAL EXAM:      Constitutional:    Neck:    Breasts:    Back:    Respiratory:    Cardiovascular:    Gastrointestinal:    Genitourinary:    Rectal:    Extremities:    Vascular:    Neurological:    Skin:    Lymph Nodes:    Musculoskeletal:    Psychiatric:          LABS:                        13.4   12.41 )-----------( 242      ( 28 Mar 2021 06:13 )             38.9     03-28    140  |  104  |  22.0<H>  ----------------------------<  111<H>  4.4   |  22.0  |  0.90    Ca    9.1      28 Mar 2021 06:13  Mg     2.0     03-27    TPro  7.0  /  Alb  3.8  /  TBili  0.5  /  DBili  x   /  AST  28  /  ALT  39  /  AlkPhos  107  03-28    PT/INR - ( 27 Mar 2021 16:48 )   PT: 12.3 sec;   INR: 1.06 ratio         PTT - ( 28 Mar 2021 06:13 )  PTT:116.0 sec    Radiology :           Internal Medicine Hospitalist Progress Note    follow up for PE with DVT   pt is seen earlier , lying down in the bed , no distress looks comfortable   SOB is better , denies leg pain or CP      chart reviewed     ROS: as above, all remaining ROS are negative.       BACKGROUND:  MEDICATIONS  (STANDING):  atorvastatin 20 milliGRAM(s) Oral at bedtime  heparin  Infusion.  Unit(s)/Hr (22 mL/Hr) IV Continuous <Continuous>    MEDICATIONS  (PRN):  heparin   Injectable 41982 Unit(s) IV Push every 6 hours PRN For aPTT less than 40  heparin   Injectable 5000 Unit(s) IV Push every 6 hours PRN For aPTT between 40 - 57    Allergies    penicillin (Hives)    Intolerances            VITALS:  Vital Signs Last 24 Hrs  T(C): 36.8 (28 Mar 2021 09:10), Max: 37.2 (27 Mar 2021 19:21)  T(F): 98.3 (28 Mar 2021 09:10), Max: 99 (27 Mar 2021 19:21)  HR: 100 (28 Mar 2021 09:10) (100 - 125)  BP: 91/63 (28 Mar 2021 09:10) (91/63 - 138/110)  BP(mean): 81 (28 Mar 2021 04:37) (81 - 90)  RR: 22 (28 Mar 2021 09:10) (22 - 22)  SpO2: 97% (28 Mar 2021 09:10) (90% - 99%) Daily Height in cm: 180.34 (27 Mar 2021 15:18)    Daily   CAPILLARY BLOOD GLUCOSE        I&O's Summary      PHYSICAL EXAM:      Constitutional: awake alert , no distress     Neck: supple , no JVD     Respiratory: CTA bilateral     Cardiovascular: regular s1 /.s2     Gastrointestinal: soft no tenderness , BS positive     Extremities: no pretibial edema             LABS:                        13.4   12.41 )-----------( 242      ( 28 Mar 2021 06:13 )             38.9     03-28    140  |  104  |  22.0<H>  ----------------------------<  111<H>  4.4   |  22.0  |  0.90    Ca    9.1      28 Mar 2021 06:13  Mg     2.0     03-27    TPro  7.0  /  Alb  3.8  /  TBili  0.5  /  DBili  x   /  AST  28  /  ALT  39  /  AlkPhos  107  03-28    PT/INR - ( 27 Mar 2021 16:48 )   PT: 12.3 sec;   INR: 1.06 ratio         PTT - ( 28 Mar 2021 06:13 )  PTT:116.0 sec    Radiology :

## 2021-03-28 NOTE — PROGRESS NOTE ADULT - ASSESSMENT
47 YO M with HLD, obesity ,  s/p cervical spine fusion on 3/8 admitted with hypoxia and  PE , lower ext doppler positive DVT       1-Pulmonary embolism with DVT   hypoxia TTE result pending  Interventional cardiology follow up re thrombectomy ?   stable   cont iv heparin     2- S/p spinal fusion   orthopedics consult appreciated

## 2021-03-29 ENCOUNTER — TRANSCRIPTION ENCOUNTER (OUTPATIENT)
Age: 46
End: 2021-03-29

## 2021-03-29 LAB
APTT BLD: 71 SEC — HIGH (ref 27.5–35.5)
BLD GP AB SCN SERPL QL: SIGNIFICANT CHANGE UP
HCT VFR BLD CALC: 40.3 % — SIGNIFICANT CHANGE UP (ref 39–50)
HGB BLD-MCNC: 13.8 G/DL — SIGNIFICANT CHANGE UP (ref 13–17)
MCHC RBC-ENTMCNC: 29.2 PG — SIGNIFICANT CHANGE UP (ref 27–34)
MCHC RBC-ENTMCNC: 34.2 GM/DL — SIGNIFICANT CHANGE UP (ref 32–36)
MCV RBC AUTO: 85.2 FL — SIGNIFICANT CHANGE UP (ref 80–100)
PLATELET # BLD AUTO: 231 K/UL — SIGNIFICANT CHANGE UP (ref 150–400)
RBC # BLD: 4.73 M/UL — SIGNIFICANT CHANGE UP (ref 4.2–5.8)
RBC # FLD: 13.2 % — SIGNIFICANT CHANGE UP (ref 10.3–14.5)
WBC # BLD: 10.52 K/UL — HIGH (ref 3.8–10.5)
WBC # FLD AUTO: 10.52 K/UL — HIGH (ref 3.8–10.5)

## 2021-03-29 PROCEDURE — 37184 PRIM ART M-THRMBC 1ST VSL: CPT | Mod: 50

## 2021-03-29 PROCEDURE — 36014 PLACE CATHETER IN ARTERY: CPT

## 2021-03-29 PROCEDURE — 99232 SBSQ HOSP IP/OBS MODERATE 35: CPT

## 2021-03-29 PROCEDURE — 75743 ARTERY X-RAYS LUNGS: CPT | Mod: 26,59

## 2021-03-29 PROCEDURE — 99152 MOD SED SAME PHYS/QHP 5/>YRS: CPT | Mod: 59

## 2021-03-29 RX ORDER — APIXABAN 2.5 MG/1
10 TABLET, FILM COATED ORAL EVERY 12 HOURS
Refills: 0 | Status: DISCONTINUED | OUTPATIENT
Start: 2021-03-29 | End: 2021-03-30

## 2021-03-29 RX ADMIN — APIXABAN 10 MILLIGRAM(S): 2.5 TABLET, FILM COATED ORAL at 18:22

## 2021-03-29 RX ADMIN — ATORVASTATIN CALCIUM 20 MILLIGRAM(S): 80 TABLET, FILM COATED ORAL at 20:43

## 2021-03-29 NOTE — CHART NOTE - NSCHARTNOTEFT_GEN_A_CORE
Department of Cardiology                                                                  [facility]                                                                        [address]                                                            Telephone: [phone]. Fax:[phone]                                                    Event Note:  Suture removal    47 y/o M with PMH HLD, obesity , s/p cervical spine fusion on 3/8 (anterior posterior cervical reconstruction in a C6-C7)  admitted with hypoxia, SOB and LE pain now s/p mechanical thrombectomy (INARI) with Dr. Oneill.    Figure 8 suture removed from right groin access site.  Manual compression applied to site for 15 minutes. Site benign.  No bleeding, no ecchymosis, no hematoma. Extremity Warm to touch, with palpable distal pulses, and brisk capillary refill.  Patient appears calm, comfortable and in no acute distress.    - Tegaderm with gauze applied to site  - Patient to remain flat for 4 hours post suture removal  - Q15 site checks x 1 hours, q30min x 1, then q1H x 2hours.   - Continue meds as ordered.

## 2021-03-29 NOTE — DISCHARGE NOTE PROVIDER - CARE PROVIDERS DIRECT ADDRESSES
,[email or web address],[email or web address] [email or web address],DirectAddress_Unknown,[email or web address]

## 2021-03-29 NOTE — PROGRESS NOTE ADULT - PROBLEM SELECTOR PLAN 1
RAMONE Mechanical Thrombectomy  -Patient seen and examined   -Labs and EKG reviewed    -Reports reviewed   -on heparin gtt  -Pre-procedure teaching completed with patient, questions answered, risks and benefits explained.    -Informed consent obtained by Dr. Oneill

## 2021-03-29 NOTE — PROGRESS NOTE ADULT - ASSESSMENT
45 y/o M is now s/p RHC / Pulmonary angiography and mechanical thrombectomy (INARI) via right femoral approach with Dr. Oneill.  Right groin not benign. No bleeding, no ecchymosis, no hematoma. Extremity Warm to touch, with palpable distal pulses, and brisk capillary refill.  Patient to remain in CCL recovery area then transfer to [facility].     47 y/o M is now s/p RHC / Pulmonary angiography and mechanical thrombectomy (INARI) via right femoral approach with Dr. Oneill.  Right groin now stable. No bleeding, no ecchymosis, no hematoma. Extremity Warm to touch, with palpable distal pulses, and brisk capillary refill.  Patient to remain in CCL recovery area then transfer to [facility].

## 2021-03-29 NOTE — DISCHARGE NOTE PROVIDER - CARE PROVIDER_API CALL
Venessa Gabriel (DO)  Internal Medicine  [address], [address]  [place] [address]  Phone: [phone]  Fax: [phone]  Follow Up Time:     Linus Meléndez)  Cardiovascular Disease  [address], [address]  [place], [place] [id number]  Phone: [phone]  Fax: [phone]  Follow Up Time:    Venessa Gabriel (DO)  Internal Medicine  [address], [address]  [place] [address]  Phone: [phone]  Fax: [phone]  Follow Up Time:     Linus Meléndez)  Cardiovascular Disease  [address], [address]  [place], [place] [id number]  Phone: [phone]  Fax: [phone]  Follow Up Time:     Rich De La Torre (DO)  Orthopaedic Surgery  [address]  [place] [address]  Phone: [phone]  Fax: [phone]  Follow Up Time: 2 weeks

## 2021-03-29 NOTE — DISCHARGE NOTE PROVIDER - HOSPITAL COURSE
47 y/o obese male with h/o HLD, obese, s/p anterior and posterior cervical fusion C5-T1 with C6-7 corpectomy/cage with Dr De La Torre on 3/8/21 presents with c/o SOB and FIELD x 1 day. Pt states symptoms started yesterday, he noted his HR on his smart watch was in the 150s since night prior to admission and he was experiencing severe SOB with exertion, which improved at rest. Pt denied CP but stated in the morning of admission he had a near syncopal episode but never had LOC. no abd. pain, no n/v/d. no fever, no chills, no sig. cough, no hemoptysis, no melena, no hematemesis as per pt. pt. 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 got his 2nd dose of covid-19 vaccine ( pfizer ) about 7 days after his surgery and had some body pains / chills and fever x 1 day and then got better. Pt. also stated that about  4 days ago prior to admission he had mild discomfort in both his legs x 1 day and then got better. pt. has been evaluated by ICU team in the ER and was not accepted to ICU. pt's first trop is 0.03 and 2nd is 0.16. pt. stated that o2 is helping, is on 3 L NC and sat is 98 % range. pt's 2nd trop is positive which is discussed with cardiologist Dr. Avila who recommends to continue heparin drip and no need to repeat further troponin as long as hemodynamically stable and no cp.  pt. reported no prior h/o blood clots in him or family. CTA Chest was preformed and notable for  Extensive bilateral pulmonary emboli with suggestion of right heart strain. Possible right lower lobe pulmonary infarct.  Follow day, pt for further plan for PE or Thrombectomy. on 3/29/21 pt underwent INARI mechanical thrombectomy with Dr. Oneill.  Pt tolerated procedure well, was transitioned from heparin gtt, to PO Eliquis. It was recommended pt to have repeat NICOLLE in 48hrs s/p procedure.  Ortho was consulted to continued to follow up on pt given s/p anterior and posterior cervical fusion C5-T1 with C6-7 corpectomy/cage 3/8/21.  Throughtout hospitalization pt continued to improve, and was weaned of oxygen and maintaining saturation about 95%.  Repeat TTE was notable for_______. All electrolyte abnormalities were monitored carefully and repleted as necessary during this hospitalization. At the time of discharge patient was hemodynamically stable and amenable to all terms of discharge. The patient has received verbal instructions from myself regarding discharge plans.     Length of Discharge: 45MIN

## 2021-03-29 NOTE — DISCHARGE NOTE PROVIDER - NSDCCPCAREPLAN_GEN_ALL_CORE_FT
PRINCIPAL DISCHARGE DIAGNOSIS  Diagnosis: Acute pulmonary embolism, unspecified pulmonary embolism type, unspecified whether acute cor pulmonale present  Assessment and Plan of Treatment:       SECONDARY DISCHARGE DIAGNOSES  Diagnosis: Hypoxia  Assessment and Plan of Treatment:      PRINCIPAL DISCHARGE DIAGNOSIS  Diagnosis: Acute pulmonary embolism, unspecified pulmonary embolism type, unspecified whether acute cor pulmonale present  Assessment and Plan of Treatment: Please continue to take Elquis 10mg every 12 hours.  Follow up with Cardiology as instructed.  If SOB worsen, please return to ER.      SECONDARY DISCHARGE DIAGNOSES  Diagnosis: Hyperlipidemia, unspecified hyperlipidemia type  Assessment and Plan of Treatment: Continue Atorvastatin at bedtime.  Follow up with PCP, and Cardiology in 1-2 weeks.    Diagnosis: DVT (deep venous thrombosis)  Assessment and Plan of Treatment: Continue Eliquis 10 mg every 12 hours.  Follow up with Cardiology in 1-2 weeks.    Diagnosis: S/P cervical spinal fusion  Assessment and Plan of Treatment: S/P cervical spinal fusion.  Follow up with Orthopedics as instructed.     PRINCIPAL DISCHARGE DIAGNOSIS  Diagnosis: Acute pulmonary embolus  Assessment and Plan of Treatment: with right herart strain , s/p thrombectomy cont blood thinner as prescribied   obtain new prescription at the end of 1 month   use blood thinner total 6 months      SECONDARY DISCHARGE DIAGNOSES  Diagnosis: S/P cervical spinal fusion  Assessment and Plan of Treatment: Follow up with Orthopedics as instructed.    Diagnosis: Hyperlipidemia, unspecified hyperlipidemia type  Assessment and Plan of Treatment: Continue Atorvastatin at bedtime.  Follow up with PCP, and Cardiology in 1-2 weeks.    Diagnosis: DVT (deep venous thrombosis)  Assessment and Plan of Treatment: Continue Eliquis 10 mg every 12 hours.  Follow up with Cardiology in 1-2 weeks.

## 2021-03-29 NOTE — PROGRESS NOTE ADULT - PROBLEM SELECTOR PLAN 1
-ADMIT due to:  Pt is already in-patient - Admit to [facility]  -post cardiac cath orders  -radial or groin precautions  -bedrest x 6 hours post procedure  -labs as ordered  -continue current medical therapy  - Continue heparin gtt (Full Anticoagulation Nomogram)  - Eliquis 10mg to begin tonight at 6pm (continue 10mg BID x 7 days, then 5mg BID daily)  - D/C heparin gtt 2 hours after first Eliquis dose is given  - Management per Hospitalist  - [facility] Cardiology following during hospitalization  - Ortho following during hospitalization (Dr. De La Torre) -ADMIT due to:  Pt is already in-patient - Admit to [facility]  -post cardiac cath orders  -right groin precautions; figure 8 to be removed per Cardiology NP  -bedrest x 6 hours post procedure  -labs as ordered  -continue current medical therapy  - Continue heparin gtt (Full Anticoagulation Nomogram)  - Eliquis 10mg to begin tonight at 6pm (continue 10mg BID x 7 days, then 5mg BID daily)  - D/C heparin gtt 2 hours after first Eliquis dose is given  - Management per Hospitalist  - [facility] Cardiology following during hospitalization  -Follow up outpt with Dr. Gabriel, Cardiologist post discharge  - Ortho following during hospitalization (Dr. De La Torre) -ADMIT due to:    Pt is already in-patient - Admit to [facility]  -post cardiac cath orders  -right groin precautions; figure 8 to be removed per Cardiology NP  -bedrest x 6 hours post procedure  -labs as ordered  -continue current medical therapy  - Repeat Echo in 48 hours  - Continue heparin gtt (Full Anticoagulation Nomogram)  - Eliquis 10mg to begin tonight at 6pm (continue 10mg BID x 7 days, then 5mg BID daily)  - D/C heparin gtt 2 hours after first Eliquis dose is given  - Management per Hospitalist  - [facility] Cardiology following during hospitalization  -Follow up outpt with Dr. Gabriel, Cardiologist post discharge  - Ortho following during hospitalization (Dr. De La Torre)

## 2021-03-29 NOTE — PROGRESS NOTE ADULT - SUBJECTIVE AND OBJECTIVE BOX
Department of Cardiology                                                                  [facility]/[place] [address] [address] [facility]                                                            Telephone: [phone]. Fax:[phone]                                                    Post- Procedure Note: Right Heart Cath/ Pulmonary Angiography/ Mechanical Thrombectomy (INARI)    Narrative:  45 y/o Male is now s/p RHC/Pulmonary angiography and  Mechanical Thrombectomy (INARI) via right femoral approach with Dr. Oneill.  Right groin site sutured,  hematoma and bleeding noted; manual compression held for 15 mins, site now clean/dry/intact.   No bleeding, no ecchymosis, no hematoma. Extremity Warm to touch, with palpable distal pulses, and brisk capillary refill.    Contrast: Omnipaque 110mL  Fluids: 50mL  FLOWTRIEVER Catheter 14mm  PA Pressure Pre : 63/31/45  PA SAT Pre: 59.4%  AO SAT Pre: 96%  PA Pressure Post : 44/24/31    PA Sat Post: 54.7%    PAST MEDICAL & SURGICAL HISTORY:  Obesity  Hyperlipidemia  S/P cervical spinal fusion  H/O cervical fracture    FAMILY HISTORY:  Family history of diabetes mellitus (DM)  parents    Home Medications:  acetaminophen 325 mg oral tablet: 3 tab(s) orally every 6 hours, As Needed for mild pain (10 Mar 2021 10:17)  atorvastatin 20 mg oral tablet: 1 tab(s) orally once a day (09 Mar 2021 07:56)  Multiple Vitamins oral tablet: 1 tab(s) orally once a day (09 Mar 2021 07:56)    Patient is a 46y old  Male who presents with a chief complaint of DVT/PE- INARI Mechanical Thrombectomy (29 Mar 2021 08:44)    HEALTH ISSUES - PROBLEM Dx:  DVT (deep venous thrombosis)  Leukocytosis, unspecified type  Pulmonary thromboembolism  Suspected deep vein thrombosis (DVT)  S/P cervical spinal fusion  Obesity (BMI 30-39.9)  Hyperlipidemia, unspecified hyperlipidemia type  Bilateral pulmonary embolism  Hypoxia  Acute pulmonary embolism, unspecified pulmonary embolism type, unspecified whether acute cor pulmonale present    HPI:  45 y/o obese male with h/o HLD, obese, s/p anterior and posterior cervical fusion C5-T1 with C6-7 corpectomy/cage with Dr De La Torre on 3/8/21 presents with c/o SOB and FIELD x 1 day. Pt states symptoms started yesterday, he noted his HR on his smart watch was in the 150s since last night and he was having severe SOB with exertion, better at rest. Pt denies CP but states this am he had a near syncopal episode but never had LOC. no abd. pain, no n/v/d. no fever, no chills, no sig. cough, no hemoptysis, no melena, no hematemesis as per pt. pt. 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 got his 2nd dose of covid-19 vaccine ( pfizer ) about 7 days after his surgery and had some body pains / chills and fever x 1 day and then got better. Pt. also stated that about  4 days ago he had mild discomfort in both his legs x 1 day and then got better. pt. has been evaluated by ICU team in the ER and was not accepted to icu. pt's first trop is 0.03 and 2nd is 0.16. pt. stated that o2 is helping, is on 3 L NC and sat is 98 % range. pt's 2nd trop is positive which is discussed with cardiologist Dr. Avila who recommends to continue heparin drip and no need to repeat further troponin as long as hemodynamically stable and no cp. pt. will be evaluated by him in the am for further plan for PE or Thrombectomy. pt. reports no prior h/o blood clots in him or family.  (27 Mar 2021 21:07)    General: No fatigue, no fevers/chills  Respiratory: No dyspnea, no cough, no wheeze  CV: No chest pain, no palpitations  Abd: No nausea  Neuro: No headache, no dizziness  penicillin (Hives)      Objective:  Vital Signs Last 24 Hrs  T(C): 36.8 (29 Mar 2021 08:49), Max: 37.6 (28 Mar 2021 19:35)  T(F): 98.2 (29 Mar 2021 08:49), Max: 99.7 (28 Mar 2021 19:35)  HR: 100 (29 Mar 2021 08:49) (100 - 104)  BP: 148/104 (29 Mar 2021 08:49) (98/62 - 148/104)  BP(mean): --  RR: 18 (29 Mar 2021 08:49) (18 - 24)  SpO2: 98% (29 Mar 2021 08:49) (96% - 98%)    CM: SR  Neuro: A&OX3, CN 2-12 intact  HEENT: NC, AT  Lungs: CTA B/L  CV: S1, S2, no murmur, RRR  Abd: Soft  Right Groin: bleeding and small hematoma noted at site. Extremity Warm to touch, with palpable distal pulses, and brisk capillary refill.  Extremity: + distal pulses  EKG: NSR                        13.8   10.52 )-----------( 231      ( 29 Mar 2021 07:33 )             40.3     03-28    140  |  104  |  22.0<H>  ----------------------------<  111<H>  4.4   |  22.0  |  0.90    Ca    9.1      28 Mar 2021 06:13  Mg     2.0     03-27    TPro  7.0  /  Alb  3.8  /  TBili  0.5  /  DBili  x   /  AST  28  /  ALT  39  /  AlkPhos  107  03-28  PT/INR - ( 27 Mar 2021 16:48 )   PT: 12.3 sec;   INR: 1.06 ratio    PTT - ( 29 Mar 2021 05:47 )  PTT:71.0 sec                                                                                Department of Cardiology                                                                  [facility]/[place] [address] [address] [facility]                                                            Telephone: [phone]. Fax:[phone]               Post- Procedure Note: Right Heart Cath/ Pulmonary Angiography/ Mechanical Thrombectomy (INARI)    Narrative:  45 y/o Male is now s/p RHC/Pulmonary angiography and  Mechanical Thrombectomy (INARI) via right femoral approach with Dr. Oneill.  Right groin site sutured,  hematoma and bleeding noted; manual compression held for 15 mins, site now clean/dry/intact.   No bleeding, no ecchymosis, no hematoma. Extremity Warm to touch, with palpable distal pulses, and brisk capillary refill.    Contrast: Omnipaque 110mL  Fluids: 50mL  FLOWTRIEVER Catheter 14mm  PA Pressure Pre : 63/31/45  PA SAT Pre: 59.4%  AO SAT Pre: 96%  PA Pressure Post : 44/24/31    PA Sat Post: 54.7%    PAST MEDICAL & SURGICAL HISTORY:  Obesity  Hyperlipidemia  S/P cervical spinal fusion  H/O cervical fracture    FAMILY HISTORY:  Family history of diabetes mellitus (DM)  parents    Home Medications:  acetaminophen 325 mg oral tablet: 3 tab(s) orally every 6 hours, As Needed for mild pain (10 Mar 2021 10:17)  atorvastatin 20 mg oral tablet: 1 tab(s) orally once a day (09 Mar 2021 07:56)  Multiple Vitamins oral tablet: 1 tab(s) orally once a day (09 Mar 2021 07:56)    Patient is a 46y old  Male who presents with a chief complaint of DVT/PE- INARI Mechanical Thrombectomy (29 Mar 2021 08:44)    HEALTH ISSUES - PROBLEM Dx:  DVT (deep venous thrombosis)  Leukocytosis, unspecified type  Pulmonary thromboembolism  Suspected deep vein thrombosis (DVT)  S/P cervical spinal fusion  Obesity (BMI 30-39.9)  Hyperlipidemia, unspecified hyperlipidemia type  Bilateral pulmonary embolism  Hypoxia  Acute pulmonary embolism, unspecified pulmonary embolism type, unspecified whether acute cor pulmonale present    HPI:  45 y/o obese male with h/o HLD, obese, s/p anterior and posterior cervical fusion C5-T1 with C6-7 corpectomy/cage with Dr De La Torre on 3/8/21 presents with c/o SOB and FIELD x 1 day. Pt states symptoms started yesterday, he noted his HR on his smart watch was in the 150s since last night and he was having severe SOB with exertion, better at rest. Pt denies CP but states this am he had a near syncopal episode but never had LOC. no abd. pain, no n/v/d. no fever, no chills, no sig. cough, no hemoptysis, no melena, no hematemesis as per pt. pt. 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 got his 2nd dose of covid-19 vaccine ( pfizer ) about 7 days after his surgery and had some body pains / chills and fever x 1 day and then got better. Pt. also stated that about  4 days ago he had mild discomfort in both his legs x 1 day and then got better. pt. has been evaluated by ICU team in the ER and was not accepted to icu. pt's first trop is 0.03 and 2nd is 0.16. pt. stated that o2 is helping, is on 3 L NC and sat is 98 % range. pt's 2nd trop is positive which is discussed with cardiologist Dr. Avila who recommends to continue heparin drip and no need to repeat further troponin as long as hemodynamically stable and no cp. pt. will be evaluated by him in the am for further plan for PE or Thrombectomy. pt. reports no prior h/o blood clots in him or family.  (27 Mar 2021 21:07)    General: No fatigue, no fevers/chills  Respiratory: No dyspnea, no cough, no wheeze  CV: No chest pain, no palpitations  Abd: No nausea  Neuro: No headache, no dizziness  penicillin (Hives)      Objective:  Vital Signs Last 24 Hrs  T(C): 36.8 (29 Mar 2021 08:49), Max: 37.6 (28 Mar 2021 19:35)  T(F): 98.2 (29 Mar 2021 08:49), Max: 99.7 (28 Mar 2021 19:35)  HR: 100 (29 Mar 2021 08:49) (100 - 104)  BP: 148/104 (29 Mar 2021 08:49) (98/62 - 148/104)  BP(mean): --  RR: 18 (29 Mar 2021 08:49) (18 - 24)  SpO2: 98% (29 Mar 2021 08:49) (96% - 98%)    CM: SR  Neuro: A&OX3, CN 2-12 intact  HEENT: NC, AT  Lungs: CTA B/L  CV: S1, S2, no murmur, RRR  Abd: Soft  Right Groin: bleeding and small hematoma noted at site. Extremity Warm to touch, with palpable distal pulses, and brisk capillary refill.  Extremity: + distal pulses  EKG: NSR                        13.8   10.52 )-----------( 231      ( 29 Mar 2021 07:33 )             40.3     03-28    140  |  104  |  22.0<H>  ----------------------------<  111<H>  4.4   |  22.0  |  0.90    Ca    9.1      28 Mar 2021 06:13  Mg     2.0     03-27    TPro  7.0  /  Alb  3.8  /  TBili  0.5  /  DBili  x   /  AST  28  /  ALT  39  /  AlkPhos  107  03-28  PT/INR - ( 27 Mar 2021 16:48 )   PT: 12.3 sec;   INR: 1.06 ratio    PTT - ( 29 Mar 2021 05:47 )  PTT:71.0 sec                                                                                Department of Cardiology                                                                  [facility]/[place] [address] [address] [facility]                                                            Telephone: [phone]. Fax:[phone]    Post- Procedure Note: Right Heart Cath/ Pulmonary Angiography/ Mechanical Thrombectomy (INARI)    Narrative:  45 y/o Male is now s/p RHC/Pulmonary angiography and  Mechanical Thrombectomy (INARI) via right femoral approach with Dr. Oneill.  Right groin site sutured (figure 8),  hematoma and bleeding noted; manual compression held for 15 mins, site now clean/dry/intact.   No bleeding, no ecchymosis, no hematoma. Extremity Warm to touch, with palpable distal pulses, and brisk capillary refill.    Contrast: Omnipaque 110mL  Fluids: 50mL  FLOWTRIEVER Catheter 14mm  PA Pressure Pre : 63/31/45  PA SAT Pre: 59.4%  AO SAT Pre: 96%  PA Pressure Post : 44/24/31    PA SAT Post: 54.7%    PAST MEDICAL & SURGICAL HISTORY:  Obesity  Hyperlipidemia  S/P cervical spinal fusion  H/O cervical fracture    FAMILY HISTORY:  Family history of diabetes mellitus (DM)  parents    Home Medications:  acetaminophen 325 mg oral tablet: 3 tab(s) orally every 6 hours, As Needed for mild pain (10 Mar 2021 10:17)  atorvastatin 20 mg oral tablet: 1 tab(s) orally once a day (09 Mar 2021 07:56)  Multiple Vitamins oral tablet: 1 tab(s) orally once a day (09 Mar 2021 07:56)    Patient is a 46y old  Male who presents with a chief complaint of DVT/PE- INARI Mechanical Thrombectomy (29 Mar 2021 08:44)    HEALTH ISSUES - PROBLEM Dx:  DVT (deep venous thrombosis)  Leukocytosis, unspecified type  Pulmonary thromboembolism  Suspected deep vein thrombosis (DVT)  S/P cervical spinal fusion  Obesity (BMI 30-39.9)  Hyperlipidemia, unspecified hyperlipidemia type  Bilateral pulmonary embolism  Hypoxia  Acute pulmonary embolism, unspecified pulmonary embolism type, unspecified whether acute cor pulmonale present    HPI:  45 y/o obese male with h/o HLD, obese, s/p anterior and posterior cervical fusion C5-T1 with C6-7 corpectomy/cage with Dr De La Torre on 3/8/21 presents with c/o SOB and FIELD x 1 day. Pt states symptoms started yesterday, he noted his HR on his smart watch was in the 150s since last night and he was having severe SOB with exertion, better at rest. Pt denies CP but states this am he had a near syncopal episode but never had LOC. no abd. pain, no n/v/d. no fever, no chills, no sig. cough, no hemoptysis, no melena, no hematemesis as per pt. pt. 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 got his 2nd dose of covid-19 vaccine ( pfizer ) about 7 days after his surgery and had some body pains / chills and fever x 1 day and then got better. Pt. also stated that about  4 days ago he had mild discomfort in both his legs x 1 day and then got better. pt. has been evaluated by ICU team in the ER and was not accepted to icu. pt's first trop is 0.03 and 2nd is 0.16. pt. stated that o2 is helping, is on 3 L NC and sat is 98 % range. pt's 2nd trop is positive which is discussed with cardiologist Dr. Avila who recommends to continue heparin drip and no need to repeat further troponin as long as hemodynamically stable and no cp. pt. will be evaluated by him in the am for further plan for PE or Thrombectomy. pt. reports no prior h/o blood clots in him or family.  (27 Mar 2021 21:07)    General: No fatigue, no fevers/chills  Respiratory: No dyspnea, no cough, no wheeze  CV: No chest pain, no palpitations  Abd: No nausea  Neuro: No headache, no dizziness  penicillin (Hives)      Objective:  Vital Signs Last 24 Hrs  T(C): 36.8 (29 Mar 2021 08:49), Max: 37.6 (28 Mar 2021 19:35)  T(F): 98.2 (29 Mar 2021 08:49), Max: 99.7 (28 Mar 2021 19:35)  HR: 100 (29 Mar 2021 08:49) (100 - 104)  BP: 148/104 (29 Mar 2021 08:49) (98/62 - 148/104)  BP(mean): --  RR: 18 (29 Mar 2021 08:49) (18 - 24)  SpO2: 98% (29 Mar 2021 08:49) (96% - 98%)    CM: SR  Neuro: A&OX3, CN 2-12 intact  HEENT: NC, AT  Lungs: CTA B/L  CV: S1, S2, no murmur, RRR  Abd: Soft  Right Groin: bleeding and small hematoma noted at site. Extremity Warm to touch, with palpable distal pulses, and brisk capillary refill.  Extremity: + distal pulses  EKG: NSR                        13.8   10.52 )-----------( 231      ( 29 Mar 2021 07:33 )             40.3     03-28    140  |  104  |  22.0<H>  ----------------------------<  111<H>  4.4   |  22.0  |  0.90    Ca    9.1      28 Mar 2021 06:13  Mg     2.0     03-27    TPro  7.0  /  Alb  3.8  /  TBili  0.5  /  DBili  x   /  AST  28  /  ALT  39  /  AlkPhos  107  03-28  PT/INR - ( 27 Mar 2021 16:48 )   PT: 12.3 sec;   INR: 1.06 ratio    PTT - ( 29 Mar 2021 05:47 )  PTT:71.0 sec

## 2021-03-29 NOTE — PROGRESS NOTE ADULT - SUBJECTIVE AND OBJECTIVE BOX
Internal Medicine Hospitalist Progress Note    follow up for PE with DVT , cor pulmonale   s/p thrombectomy this morning   seen post procedure in cath lab , he is feeling better   less SOB resting in the bed '    MEDICATIONS  (STANDING):  apixaban 10 milliGRAM(s) Oral every 12 hours  atorvastatin 20 milliGRAM(s) Oral at bedtime  heparin  Infusion.  Unit(s)/Hr (22 mL/Hr) IV Continuous <Continuous>  influenza   Vaccine 0.5 milliLiter(s) IntraMuscular once        Vital Signs Last 24 Hrs  T(C): 36.8 (29 Mar 2021 08:49), Max: 37.6 (28 Mar 2021 19:35)  T(F): 98.2 (29 Mar 2021 08:49), Max: 99.7 (28 Mar 2021 19:35)  HR: 106 (29 Mar 2021 14:32) (95 - 106)  BP: 116/74 (29 Mar 2021 14:32) (98/62 - 148/104)  BP(mean): --  RR: 16 (29 Mar 2021 14:32) (15 - 24)  SpO2: 98% (29 Mar 2021 14:32) (96% - 98%)      Constitutional: awake alert , no distress     Neck: supple , no JVD     Respiratory: CTA bilateral ,    Cardiovascular: regular s1 /.s2 tachycardic     Gastrointestinal: soft no tenderness , BS positive     Extremities: no pretibial edema                               13.8   10.52 )-----------( 231      ( 29 Mar 2021 07:33 )             40.3   03-28    140  |  104  |  22.0<H>  ----------------------------<  111<H>  4.4   |  22.0  |  0.90    Ca    9.1      28 Mar 2021 06:13  Mg     2.0     03-27    TPro  7.0  /  Alb  3.8  /  TBili  0.5  /  DBili  x   /  AST  28  /  ALT  39  /  AlkPhos  107  03-28

## 2021-03-29 NOTE — ED ADULT NURSE REASSESSMENT NOTE - NS ED NURSE REASSESS COMMENT FT1
Assumed care of patient from previous RN.  A&Ox4, RR even and unlabored on 3L NC, Spo2 @ 100%.  PT with heparin drip infusing at prescribed rate.  PT reports relief of SOB and chest pain at this time.  ON CM in ST in the 120s.  Pt updated on plan of care for admission.  All questions answered.  Will continue to monitor.
Pt remains in sinus tach at 129, LP NP at bedside, states no intervention needed at this time, will continue to monitor
assumed care of pt @ this time. received report from ERNESTO Parrish from ED holding room. received pt with heparin gtt infusing without difficulty @ 16ml/hr. as per order, repeat aptt to be redrawn in 23 hours. VSS. pt has no complaints at this time. reports improvement in difficulty breathing, maintaining airway on 2lpm o2 via nasal cannula with spo2 96%. pt denies chest pain. pt aware of plan of care, awaiting bed upstairs. pt given call bell and instructed on use. pt offers no questions. will continue to reassess.

## 2021-03-29 NOTE — DISCHARGE NOTE PROVIDER - PROVIDER TOKENS
PROVIDER:[TOKEN:[[id number]]],PROVIDER:[TOKEN:[[id number]]] PROVIDER:[TOKEN:[[id number]]],PROVIDER:[TOKEN:[[id number]]],PROVIDER:[TOKEN:[[id number]],FOLLOWUP:[2 weeks]]

## 2021-03-29 NOTE — DISCHARGE NOTE PROVIDER - NSDCMRMEDTOKEN_GEN_ALL_CORE_FT
acetaminophen 325 mg oral tablet: 3 tab(s) orally every 6 hours, As Needed for mild pain  aspirin 325 mg oral delayed release tablet: 1 tab(s) orally once a day  atorvastatin 20 mg oral tablet: 1 tab(s) orally once a day  celecoxib 200 mg oral capsule: 1 cap(s) orally once a day  dexamethasone 4 mg oral tablet: 6 tab(s) x 1 days  5 tab(s) x 1 days  4 tab(s) x 1 days  3 tab(s)  x 1 days  2 tab(s)  x 1 days  1 tab(s)  x 1 days  methocarbamol 750 mg oral tablet: 1 tab(s) orally 3 times a day, As Needed -for muscle spasm   Multiple Vitamins oral tablet: 1 tab(s) orally once a day  oxyCODONE 5 mg oral tablet: 1 tab(s) orally every 3 hours, As needed, Moderate Pain (4 - 6) MDD:four  pantoprazole 40 mg oral delayed release tablet: 1 tab(s) orally once a day (before a meal)   apixaban 5 mg oral tablet: 2 tab(s) orally every 12 hours for 6 days than 1 tablets twice daily   atorvastatin 20 mg oral tablet: 1 tab(s) orally once a day  methocarbamol 750 mg oral tablet: 1 tab(s) orally 3 times a day, As Needed -for muscle spasm   Multiple Vitamins oral tablet: 1 tab(s) orally once a day  pantoprazole 40 mg oral delayed release tablet: 1 tab(s) orally once a day (before a meal)

## 2021-03-29 NOTE — PROGRESS NOTE ADULT - SUBJECTIVE AND OBJECTIVE BOX
Department of Cardiology                                                                  [facility]/[place] [address] [address]                                                            Telephone: [phone]. Fax:[phone]                                                    Pre- Procedure Note: DVT/PE- INARI Mechanical Thrombectomy      Narrative:  45 y/o M with PMH HLD, obesity , s/p cervical spine fusion on 3/8 admitted with hypoxia, SOB and LE pain.  Patient also received 2nd Pfizer COVID19 vaccine during time period after spinal fusion and prior to symptoms. Presented to ED on 3/27. LE Dopplers and Echo positive for B/L LE DVT and PE with associated RV strain. Patient was subsequently started on heparin GTT. Patient is in no acute distress, denies pain, SOB,  dizziness, and FIELD; hemodynamically stable. Today, plan for INARI mechanical thrombectomy with Dr. Oneill.      MEDICATIONS:  atorvastatin 20 milliGRAM(s) Oral at bedtime  heparin   Injectable 06873 Unit(s) IV Push every 6 hours PRN  heparin   Injectable 5000 Unit(s) IV Push every 6 hours PRN  heparin  Infusion.  Unit(s)/Hr IV Continuous <Continuous>  influenza   Vaccine 0.5 milliLiter(s) IntraMuscular once    ASA: 4  Mallampati: 2  Creatinine: 1.04  GFR: 86    PHYSICAL EXAM:    T(C): 36.8 (03-29-21 @ 08:18), Max: 37.6 (03-28-21 @ 19:35)  HR: 100 (03-29-21 @ 08:18) (100 - 104)  BP: 119/74 (03-29-21 @ 08:18) (91/63 - 119/74)  RR: 20 (03-29-21 @ 08:18) (20 - 24)  SpO2: 96% (03-29-21 @ 08:18) (96% - 98%)    Daily   Constitutional: A & O x 3  HEENT:   Normal oral mucosa, PERRL, EOMI	  Cardiovascular: Normal S1 S2, No JVD, No murmurs, No edema  Respiratory: Lungs clear to auscultation	  Gastrointestinal:  Soft, Non-tender, + BS	  Skin: No rashes, No ecchymoses, No cyanosis  Neurologic: Non-focal  Extremities: Normal range of motion, No clubbing, cyanosis or edema  Vascular: Peripheral pulses palpable 2+ bilaterally    TELEMETRY: Sinus Tachycardia      DIAGNOSTIC TESTING:  [ X] Echocardiogram:  < from: TTE Echo Complete w/o Contrast w/ Doppler (03.28.21 @ 08:08) >  PHYSICIAN INTERPRETATION:  Left Ventricle: Endocardial visualization was enhanced with intravenous echo contrast. The left ventricular internal cavity size is normal. Left ventricular wall thickness is increased.  Global LV systolic function was normal. Left ventricular ejection fraction, by visual estimation, is 50 to 55%. The interventricular septum is flattened in systole and diastole, consistent with right ventricular pressure and volume overload. Spectral Doppler shows impaired relaxation pattern of left ventricular myocardial filling (Grade I diastolic dysfunction).  Right Ventricle: The right ventricular size is severely enlarged. RV systolic function is severely reduced. TV S' 0.1 m/s.  Left Atrium: The left atrium is normal in size. The left atrium was not well visualized.  Right Atrium: Moderately enlarged right atrium.  Pericardium: There is no evidence of pericardial effusion.  Mitral Valve: There is mild mitral annular calcification. Trace mitral valve regurgitation is seen.  Tricuspid Valve: The tricuspid valve is normal in structure. Mild tricuspid regurgitation is visualized. Estimated pulmonary artery systolic pressure is 50.1 mmHg assuming a right atrial pressure of 3 mmHg, which is consistent with moderate pulmonary hypertension.  Aortic Valve: The aortic valve is trileaflet. Peak transaortic gradient equals 1.7 mmHg, mean transaortic gradient equals 0.9 mmHg, the calculated aortic valve area equals 3.30 cm² by the continuity equation consistent with normally opening aortic valve. No evidence of aortic valve regurgitation is seen.  Pulmonic Valve: The pulmonic valve is normal. Trace pulmonic valve regurgitation.  Aorta: The aortic root and ascending aorta are structurally normal, with no evidence of dilitation.  Pulmonary Artery: The main pulmonary artery is normal in size.  Venous: The pulmonary veins appear normal. The inferior vena cava was normal sized, with respiratory size variation greater than 50%.    Summary:   1. Endocardial visualization was enhancedwith intravenous echo contrast.   2. The left atrium is normal in size.   3. Left ventricular ejection fraction, by visual estimation, is 50-55%. Grade I diastolic dysfunction.   4. Moderately enlarged right atrium.   5. Severely enlarged right ventricle. Severely reduced RV systolic function.   6. Right ventricular volume and pressure overload. FIndings are suggestive of pulmonary embolism.   7. Mild tricuspid regurgitation.   8. Estimated pulmonary artery systolic pressure is 50 mmHg moderate pulmonary hypertension.   9. There is no evidence of pericardial effusion.  10. Informed Lili Meraz.    MD Elliot    < end of copied text >    [ X] Doppler: < from: US Duplex Venous Lower Ext Complete, Bilateral (03.27.21 @ 22:59) >  TECHNIQUE: Duplex sonography of the BILATERAL LOWER extremity veins with color and spectral Doppler, with and without compression.  FINDINGS:  RIGHT:  Normal compressibility of the RIGHT common femoral, femoral and popliteal veins.  Doppler examination shows normal spontaneous and phasic flow.  There is occlusive thrombus in the right posterior tibial vein. There is minimal flow in the right posterior tibial vein.  LEFT:  Normal compressibility of the LEFT common femoral, femoral and poplitealveins.  Doppler examination shows normal spontaneous and phasic flow.  There is occlusive thrombus in the left posterior tibial vein and peroneal veins. No flow is detected in these veins.  IMPRESSION:  Occlusive thrombus in the right posterior tibial vein, left posterior tibial vein and left peroneal vein.  No evidence of deep venous thrombosis in common femoral, superficial femoral and popliteal veins bilaterally.  Findings were discussed by Dr. Hare with Dr. Bustamante    < end of copied text >        CARDIAC MARKERS:                        13.8   10.52 )-----------( 231      ( 29 Mar 2021 07:33 )             40.3     03-28    140  |  104  |  22.0<H>  ----------------------------<  111<H>  4.4   |  22.0  |  0.90    Ca    9.1      28 Mar 2021 06:13  Mg     2.0     03-27    TPro  7.0  /  Alb  3.8  /  TBili  0.5  /  DBili  x   /  AST  28  /  ALT  39  /  AlkPhos  107  03-28                                                                             Department of Cardiology                                                                  [facility]/[place] [address] [address]                                                            Telephone: [phone]. Fax:[phone]                                                    Pre- Procedure Note: DVT/PE- INARI Mechanical Thrombectomy      Narrative:  47 y/o M with PMH HLD, obesity , s/p cervical spine fusion on 3/8 (anterior posterior cervical reconstruction in a C6-C7)  admitted with hypoxia, SOB and LE pain.  Patient also received 2nd Pfizer COVID19 vaccine during time period after spinal fusion and prior to symptoms. Presented to ED on 3/27. LE Dopplers and Echo positive for B/L LE DVT and PE with associated RV strain. Patient was subsequently started on heparin GTT. Patient is in no acute distress, denies pain, SOB,  dizziness, and FIELD; hemodynamically stable. Today, plan for INARI mechanical thrombectomy with Dr. Oneill.      MEDICATIONS:  atorvastatin 20 milliGRAM(s) Oral at bedtime  heparin   Injectable 59197 Unit(s) IV Push every 6 hours PRN  heparin   Injectable 5000 Unit(s) IV Push every 6 hours PRN  heparin  Infusion.  Unit(s)/Hr IV Continuous <Continuous>  influenza   Vaccine 0.5 milliLiter(s) IntraMuscular once    ASA: 4  Mallampati: 2  Creatinine: 1.04  GFR: 86    PHYSICAL EXAM:    T(C): 36.8 (03-29-21 @ 08:18), Max: 37.6 (03-28-21 @ 19:35)  HR: 100 (03-29-21 @ 08:18) (100 - 104)  BP: 119/74 (03-29-21 @ 08:18) (91/63 - 119/74)  RR: 20 (03-29-21 @ 08:18) (20 - 24)  SpO2: 96% (03-29-21 @ 08:18) (96% - 98%)    Daily   Constitutional: A & O x 3  HEENT:   Normal oral mucosa, PERRL, EOMI	  Cardiovascular: Normal S1 S2, No JVD, No murmurs, No edema  Respiratory: Lungs clear to auscultation	  Gastrointestinal:  Soft, Non-tender, + BS	  Skin: No rashes, No ecchymoses, No cyanosis  Neurologic: Non-focal  Extremities: Normal range of motion, No clubbing, cyanosis or edema  Vascular: Peripheral pulses palpable 2+ bilaterally  Allergies: PCN    TELEMETRY: Sinus Tachycardia      DIAGNOSTIC TESTING:  [ X] Echocardiogram:  < from: TTE Echo Complete w/o Contrast w/ Doppler (03.28.21 @ 08:08) >  PHYSICIAN INTERPRETATION:  Left Ventricle: Endocardial visualization was enhanced with intravenous echo contrast. The left ventricular internal cavity size is normal. Left ventricular wall thickness is increased.  Global LV systolic function was normal. Left ventricular ejection fraction, by visual estimation, is 50 to 55%. The interventricular septum is flattened in systole and diastole, consistent with right ventricular pressure and volume overload. Spectral Doppler shows impaired relaxation pattern of left ventricular myocardial filling (Grade I diastolic dysfunction).  Right Ventricle: The right ventricular size is severely enlarged. RV systolic function is severely reduced. TV S' 0.1 m/s.  Left Atrium: The left atrium is normal in size. The left atrium was not well visualized.  Right Atrium: Moderately enlarged right atrium.  Pericardium: There is no evidence of pericardial effusion.  Mitral Valve: There is mild mitral annular calcification. Trace mitral valve regurgitation is seen.  Tricuspid Valve: The tricuspid valve is normal in structure. Mild tricuspid regurgitation is visualized. Estimated pulmonary artery systolic pressure is 50.1 mmHg assuming a right atrial pressure of 3 mmHg, which is consistent with moderate pulmonary hypertension.  Aortic Valve: The aortic valve is trileaflet. Peak transaortic gradient equals 1.7 mmHg, mean transaortic gradient equals 0.9 mmHg, the calculated aortic valve area equals 3.30 cm² by the continuity equation consistent with normally opening aortic valve. No evidence of aortic valve regurgitation is seen.  Pulmonic Valve: The pulmonic valve is normal. Trace pulmonic valve regurgitation.  Aorta: The aortic root and ascending aorta are structurally normal, with no evidence of dilitation.  Pulmonary Artery: The main pulmonary artery is normal in size.  Venous: The pulmonary veins appear normal. The inferior vena cava was normal sized, with respiratory size variation greater than 50%.    Summary:   1. Endocardial visualization was enhancedwith intravenous echo contrast.   2. The left atrium is normal in size.   3. Left ventricular ejection fraction, by visual estimation, is 50-55%. Grade I diastolic dysfunction.   4. Moderately enlarged right atrium.   5. Severely enlarged right ventricle. Severely reduced RV systolic function.   6. Right ventricular volume and pressure overload. FIndings are suggestive of pulmonary embolism.   7. Mild tricuspid regurgitation.   8. Estimated pulmonary artery systolic pressure is 50 mmHg moderate pulmonary hypertension.   9. There is no evidence of pericardial effusion.  10. Informed Lili Meraz.    MD Elliot    < end of copied text >    [ X] Doppler: < from: US Duplex Venous Lower Ext Complete, Bilateral (03.27.21 @ 22:59) >  TECHNIQUE: Duplex sonography of the BILATERAL LOWER extremity veins with color and spectral Doppler, with and without compression.  FINDINGS:  RIGHT:  Normal compressibility of the RIGHT common femoral, femoral and popliteal veins.  Doppler examination shows normal spontaneous and phasic flow.  There is occlusive thrombus in the right posterior tibial vein. There is minimal flow in the right posterior tibial vein.  LEFT:  Normal compressibility of the LEFT common femoral, femoral and poplitealveins.  Doppler examination shows normal spontaneous and phasic flow.  There is occlusive thrombus in the left posterior tibial vein and peroneal veins. No flow is detected in these veins.  IMPRESSION:  Occlusive thrombus in the right posterior tibial vein, left posterior tibial vein and left peroneal vein.  No evidence of deep venous thrombosis in common femoral, superficial femoral and popliteal veins bilaterally.  Findings were discussed by Dr. Hare with Dr. Bustamante    < end of copied text >        CARDIAC MARKERS:                        13.8   10.52 )-----------( 231      ( 29 Mar 2021 07:33 )             40.3     03-28    140  |  104  |  22.0<H>  ----------------------------<  111<H>  4.4   |  22.0  |  0.90    Ca    9.1      28 Mar 2021 06:13  Mg     2.0     03-27    TPro  7.0  /  Alb  3.8  /  TBili  0.5  /  DBili  x   /  AST  28  /  ALT  39  /  AlkPhos  107  03-28

## 2021-03-29 NOTE — PROGRESS NOTE ADULT - ASSESSMENT
47 y/o M with PMH HLD, obesity , s/p cervical spine fusion on 3/8 admitted with hypoxia, SOB and LE pain consistent with BL LE DVT/PE presents today for INARI mechanical thrombectomy with Dr. Oneill.

## 2021-03-29 NOTE — PROGRESS NOTE ADULT - ASSESSMENT
47 YO M with HLD, obesity ,  s/p cervical spine fusion on 3/8 admitted with hypoxia and  PE , lower ext doppler positive DVT ,seen by interventional cardiology for possibel thrombectomy TTE performed , + R heart strain ,anti coagulation  iv heparin started   day 3 pt had thrombectomy . tolerated [rocedure well       1-Pulmonary embolism with corpulmonale  s/p thrombectomy today   tolerated procedure well   cont anticoagulation on eliquis now   home in 1-2 days likely       2- S/p spinal fusion in Feb 2021   orthopedics consult appreciated

## 2021-03-30 ENCOUNTER — TRANSCRIPTION ENCOUNTER (OUTPATIENT)
Age: 46
End: 2021-03-30

## 2021-03-30 VITALS — HEART RATE: 103 BPM | SYSTOLIC BLOOD PRESSURE: 137 MMHG | DIASTOLIC BLOOD PRESSURE: 85 MMHG

## 2021-03-30 LAB
ANION GAP SERPL CALC-SCNC: 13 MMOL/L — SIGNIFICANT CHANGE UP (ref 5–17)
BUN SERPL-MCNC: 16 MG/DL — SIGNIFICANT CHANGE UP (ref 8–20)
CALCIUM SERPL-MCNC: 8.6 MG/DL — SIGNIFICANT CHANGE UP (ref 8.6–10.2)
CHLORIDE SERPL-SCNC: 104 MMOL/L — SIGNIFICANT CHANGE UP (ref 98–107)
CO2 SERPL-SCNC: 24 MMOL/L — SIGNIFICANT CHANGE UP (ref 22–29)
CREAT SERPL-MCNC: 0.94 MG/DL — SIGNIFICANT CHANGE UP (ref 0.5–1.3)
GLUCOSE SERPL-MCNC: 96 MG/DL — SIGNIFICANT CHANGE UP (ref 70–99)
HCT VFR BLD CALC: 34 % — LOW (ref 39–50)
HCT VFR BLD CALC: 35.2 % — LOW (ref 39–50)
HGB BLD-MCNC: 11.8 G/DL — LOW (ref 13–17)
HGB BLD-MCNC: 12.5 G/DL — LOW (ref 13–17)
MAGNESIUM SERPL-MCNC: 1.9 MG/DL — SIGNIFICANT CHANGE UP (ref 1.6–2.6)
MCHC RBC-ENTMCNC: 29.4 PG — SIGNIFICANT CHANGE UP (ref 27–34)
MCHC RBC-ENTMCNC: 29.8 PG — SIGNIFICANT CHANGE UP (ref 27–34)
MCHC RBC-ENTMCNC: 34.7 GM/DL — SIGNIFICANT CHANGE UP (ref 32–36)
MCHC RBC-ENTMCNC: 35.5 GM/DL — SIGNIFICANT CHANGE UP (ref 32–36)
MCV RBC AUTO: 83.8 FL — SIGNIFICANT CHANGE UP (ref 80–100)
MCV RBC AUTO: 84.6 FL — SIGNIFICANT CHANGE UP (ref 80–100)
NT-PROBNP SERPL-SCNC: 622 PG/ML — HIGH (ref 0–300)
PHOSPHATE SERPL-MCNC: 3 MG/DL — SIGNIFICANT CHANGE UP (ref 2.4–4.7)
PLATELET # BLD AUTO: 214 K/UL — SIGNIFICANT CHANGE UP (ref 150–400)
PLATELET # BLD AUTO: 214 K/UL — SIGNIFICANT CHANGE UP (ref 150–400)
POTASSIUM SERPL-MCNC: 4.2 MMOL/L — SIGNIFICANT CHANGE UP (ref 3.5–5.3)
POTASSIUM SERPL-SCNC: 4.2 MMOL/L — SIGNIFICANT CHANGE UP (ref 3.5–5.3)
RBC # BLD: 4.02 M/UL — LOW (ref 4.2–5.8)
RBC # BLD: 4.2 M/UL — SIGNIFICANT CHANGE UP (ref 4.2–5.8)
RBC # FLD: 13 % — SIGNIFICANT CHANGE UP (ref 10.3–14.5)
RBC # FLD: 13.2 % — SIGNIFICANT CHANGE UP (ref 10.3–14.5)
SODIUM SERPL-SCNC: 141 MMOL/L — SIGNIFICANT CHANGE UP (ref 135–145)
WBC # BLD: 7.72 K/UL — SIGNIFICANT CHANGE UP (ref 3.8–10.5)
WBC # BLD: 7.98 K/UL — SIGNIFICANT CHANGE UP (ref 3.8–10.5)
WBC # FLD AUTO: 7.72 K/UL — SIGNIFICANT CHANGE UP (ref 3.8–10.5)
WBC # FLD AUTO: 7.98 K/UL — SIGNIFICANT CHANGE UP (ref 3.8–10.5)

## 2021-03-30 PROCEDURE — 82803 BLOOD GASES ANY COMBINATION: CPT

## 2021-03-30 PROCEDURE — 36014 PLACE CATHETER IN ARTERY: CPT

## 2021-03-30 PROCEDURE — 93970 EXTREMITY STUDY: CPT

## 2021-03-30 PROCEDURE — 86850 RBC ANTIBODY SCREEN: CPT

## 2021-03-30 PROCEDURE — 84295 ASSAY OF SERUM SODIUM: CPT

## 2021-03-30 PROCEDURE — 36415 COLL VENOUS BLD VENIPUNCTURE: CPT

## 2021-03-30 PROCEDURE — 96374 THER/PROPH/DIAG INJ IV PUSH: CPT

## 2021-03-30 PROCEDURE — 85610 PROTHROMBIN TIME: CPT

## 2021-03-30 PROCEDURE — 93306 TTE W/DOPPLER COMPLETE: CPT

## 2021-03-30 PROCEDURE — 99239 HOSP IP/OBS DSCHRG MGMT >30: CPT

## 2021-03-30 PROCEDURE — 83605 ASSAY OF LACTIC ACID: CPT

## 2021-03-30 PROCEDURE — C1769: CPT

## 2021-03-30 PROCEDURE — 72125 CT NECK SPINE W/O DYE: CPT

## 2021-03-30 PROCEDURE — 85730 THROMBOPLASTIN TIME PARTIAL: CPT

## 2021-03-30 PROCEDURE — 80053 COMPREHEN METABOLIC PANEL: CPT

## 2021-03-30 PROCEDURE — 99233 SBSQ HOSP IP/OBS HIGH 50: CPT

## 2021-03-30 PROCEDURE — 99153 MOD SED SAME PHYS/QHP EA: CPT

## 2021-03-30 PROCEDURE — 86769 SARS-COV-2 COVID-19 ANTIBODY: CPT

## 2021-03-30 PROCEDURE — 84484 ASSAY OF TROPONIN QUANT: CPT

## 2021-03-30 PROCEDURE — U0003: CPT

## 2021-03-30 PROCEDURE — 93005 ELECTROCARDIOGRAM TRACING: CPT

## 2021-03-30 PROCEDURE — C1757: CPT

## 2021-03-30 PROCEDURE — 71275 CT ANGIOGRAPHY CHEST: CPT

## 2021-03-30 PROCEDURE — 82330 ASSAY OF CALCIUM: CPT

## 2021-03-30 PROCEDURE — 36600 WITHDRAWAL OF ARTERIAL BLOOD: CPT

## 2021-03-30 PROCEDURE — 93308 TTE F-UP OR LMTD: CPT | Mod: 26

## 2021-03-30 PROCEDURE — U0005: CPT

## 2021-03-30 PROCEDURE — 83735 ASSAY OF MAGNESIUM: CPT

## 2021-03-30 PROCEDURE — 85025 COMPLETE CBC W/AUTO DIFF WBC: CPT

## 2021-03-30 PROCEDURE — 85018 HEMOGLOBIN: CPT

## 2021-03-30 PROCEDURE — C1887: CPT

## 2021-03-30 PROCEDURE — 93308 TTE F-UP OR LMTD: CPT

## 2021-03-30 PROCEDURE — 82435 ASSAY OF BLOOD CHLORIDE: CPT

## 2021-03-30 PROCEDURE — 80048 BASIC METABOLIC PNL TOTAL CA: CPT

## 2021-03-30 PROCEDURE — 84132 ASSAY OF SERUM POTASSIUM: CPT

## 2021-03-30 PROCEDURE — 99285 EMERGENCY DEPT VISIT HI MDM: CPT | Mod: 25

## 2021-03-30 PROCEDURE — 85014 HEMATOCRIT: CPT

## 2021-03-30 PROCEDURE — 86901 BLOOD TYPING SEROLOGIC RH(D): CPT

## 2021-03-30 PROCEDURE — 86900 BLOOD TYPING SEROLOGIC ABO: CPT

## 2021-03-30 PROCEDURE — 82947 ASSAY GLUCOSE BLOOD QUANT: CPT

## 2021-03-30 PROCEDURE — 83880 ASSAY OF NATRIURETIC PEPTIDE: CPT

## 2021-03-30 PROCEDURE — 84100 ASSAY OF PHOSPHORUS: CPT

## 2021-03-30 PROCEDURE — 99152 MOD SED SAME PHYS/QHP 5/>YRS: CPT

## 2021-03-30 PROCEDURE — 37184 PRIM ART M-THRMBC 1ST VSL: CPT | Mod: 50

## 2021-03-30 PROCEDURE — C1889: CPT

## 2021-03-30 PROCEDURE — 85027 COMPLETE CBC AUTOMATED: CPT

## 2021-03-30 PROCEDURE — 75743 ARTERY X-RAYS LUNGS: CPT | Mod: 59

## 2021-03-30 PROCEDURE — C1894: CPT

## 2021-03-30 PROCEDURE — 87040 BLOOD CULTURE FOR BACTERIA: CPT

## 2021-03-30 RX ORDER — APIXABAN 2.5 MG/1
2 TABLET, FILM COATED ORAL
Qty: 72 | Refills: 0
Start: 2021-03-30 | End: 2021-04-28

## 2021-03-30 RX ORDER — APIXABAN 2.5 MG/1
2 TABLET, FILM COATED ORAL
Qty: 72 | Refills: 0
Start: 2021-03-30 | End: 2021-04-04

## 2021-03-30 RX ADMIN — APIXABAN 10 MILLIGRAM(S): 2.5 TABLET, FILM COATED ORAL at 18:15

## 2021-03-30 RX ADMIN — APIXABAN 10 MILLIGRAM(S): 2.5 TABLET, FILM COATED ORAL at 05:58

## 2021-03-30 NOTE — DISCHARGE NOTE NURSING/CASE MANAGEMENT/SOCIAL WORK - PATIENT PORTAL LINK FT
You can access the FollowMyHealth Patient Portal offered by [facility] by registering at the following website: [email or web address]. By joining [facility]’s FollowMyHealth portal, you will also be able to view your health information using other applications (apps) compatible with our system.

## 2021-03-30 NOTE — PROGRESS NOTE ADULT - SUBJECTIVE AND OBJECTIVE BOX
[facility]  [address], [place] [address]    REASON FOR VISIT:  follow up on pulmonary embolism< from: US Duplex Venous Lower Ext Complete, Bilateral (03.27.21 @ 22:59) >  UPDATE:  Feeling good  anxious to go home  echo pending  TELEMETRY MONITORING:  sinus rhythmn  On ra  and is comfortable sat 94    03-30    141  |  104  |  16.0  ----------------------------<  96  4.2   |  24.0  |  0.94    Ca    8.6      30 Mar 2021 06:21  Phos  3.0     03-30  Mg     1.9     03-30    MEDICATIONS  (STANDING):  apixaban 10 milliGRAM(s) Oral every 12 hours  atorvastatin 20 milliGRAM(s) Oral at bedtime  heparin  Infusion.  Unit(s)/Hr (22 mL/Hr) IV Continuous <Continuous>  influenza   Vaccine 0.5 milliLiter(s) IntraMuscular once    ROS:  No fever chills  Cardiac  No cp sob or palp  Resp  sob has improved no cough  Gi  no abd pain no melana  Ext No calf tenderness, no edema    Vital Signs Last 24 Hrs  T(C): 37.3 (30 Mar 2021 05:56), Max: 37.5 (29 Mar 2021 20:42)  T(F): 99.1 (30 Mar 2021 05:56), Max: 99.5 (29 Mar 2021 20:42)  HR: 99 (30 Mar 2021 05:56) (95 - 107)  BP: 111/69 (30 Mar 2021 05:56) (103/63 - 148/104)  BP(mean): --  RR: 17 (30 Mar 2021 05:56) (15 - 18)  SpO2: 98% (30 Mar 2021 05:56) (96% - 100%)  T(C): 37.3 (03-30-21 @ 05:56), Max: 37.5 (03-29-21 @ 20:42)  HR: 99 (03-30-21 @ 05:56) (95 - 107)  BP: 111/69 (03-30-21 @ 05:56) (103/63 - 148/104)  RR: 17 (03-30-21 @ 05:56) (15 - 18)  SpO2: 98% (03-30-21 @ 05:56) (96% - 100%)    HEENT Head atraumatic eomi, oral mucosa moist  CV S1&S2 Regular  RESP  clear   GI  Soft active bowel sounds non tender  right groin  soft no ecchymosis no hematoma figure 8 suture is out  EXT  No clubbing/Cyanosis /Edema  NEURO  Alert oriented  No gross motor or sensory deficits    3/29/2021Bilateral pulmonary angiograms performed. Final catheter position in left  pulmonary artery.  Using Inari device, large quantiy of thrombus removed from left and right  pulmonary arterial system.  Post pulmonary arterial pressure decrease by 15-20 mmHg.  INTERVENTIONAL RECOMMENDATIONS: Start Eliquis tonight.  Follow up with Dr. Melédnez/Dotty in office.  Prepared and signed by  Farhan Oneill MD  Signed 03/29/2021 17:17:54  HEMODYNAMIC TABLES  Pressures:  Baseline  Pressures:  - HR: 98  Pressures:  - Rhythm:  Pressures:  -- Pulmonary Artery (S/D/M): 44/24/31  Pressures:  -- Right Atrium (a/v/M): 15/14/11  Pressures:  -- Right Ventricle (s/edp): 43/14/--

## 2021-03-30 NOTE — PROGRESS NOTE ADULT - ASSESSMENT
47 y/o M with PMH HLD, obesity , s/p cervical spine fusion on 3/8 (anterior posterior cervical reconstruction in a C6-C7)  admitted with hypoxia, SOB and LE pain now s/p mechanical thrombectomy (INARI) with Dr. Oneill on 3/29  feeling well, repeat TTE ordered     1- bilateral PE with r heart strain   s/p thrombectomy   cont anticoagulation eliquis   TTE repeat today   likely home soon     2- DVT   on anticoagulation   stable

## 2021-03-30 NOTE — PROGRESS NOTE ADULT - SUBJECTIVE AND OBJECTIVE BOX
Patient is a 46y old  Male who presents with a chief complaint of dyspnea , due to PE , dvt   s/p thrombectomy   no dyspnea , feels much better   off oxygen on RA , good saturation     anxious wants to go home     Patient seen and examined at bedside. No overnight events reported.     ALLERGIES:  penicillin (Hives)    MEDICATIONS  (STANDING):  apixaban 10 milliGRAM(s) Oral every 12 hours  atorvastatin 20 milliGRAM(s) Oral at bedtime  heparin  Infusion.  Unit(s)/Hr (22 mL/Hr) IV Continuous <Continuous>  influenza   Vaccine 0.5 milliLiter(s) IntraMuscular once    MEDICATIONS  (PRN):  heparin   Injectable 95174 Unit(s) IV Push every 6 hours PRN For aPTT less than 40  heparin   Injectable 5000 Unit(s) IV Push every 6 hours PRN For aPTT between 40 - 57    Vital Signs Last 24 Hrs  T(F): 99.1 (30 Mar 2021 05:56), Max: 99.5 (29 Mar 2021 20:42)  HR: 99 (30 Mar 2021 05:56) (95 - 107)  BP: 111/69 (30 Mar 2021 05:56) (103/63 - 148/104)  RR: 17 (30 Mar 2021 05:56) (15 - 18)  SpO2: 98% (30 Mar 2021 05:56) (96% - 100%)  I&O's Summary    29 Mar 2021 07:01  -  30 Mar 2021 07:00  --------------------------------------------------------  IN: 256 mL / OUT: 0 mL / NET: 256 mL        PHYSICAL EXAM:  General: awake alert , on RA , no resp distress   Neck: supple , no JVD   Lungs: CTA bilateral , good air entry   Cardio: RRR, S1/S2, No murmur  Abdomen: Soft, Nontender, Nondistended; Bowel sounds present  Extremities: No calf tenderness, No pitting edema    LABS:                        11.8   7.98  )-----------( 214      ( 30 Mar 2021 06:23 )             34.0     03-30    141  |  104  |  16.0  ----------------------------<  96  4.2   |  24.0  |  0.94    Ca    8.6      30 Mar 2021 06:21  Phos  3.0     03-30  Mg     1.9     03-30    TPro  7.0  /  Alb  3.8  /  TBili  0.5  /  DBili  x   /  AST  28  /  ALT  39  /  AlkPhos  107  03-28        eGFR if Non African American: 97 mL/min/1.73M2 (03-30-21 @ 06:21)  eGFR if : 112 mL/min/1.73M2 (03-30-21 @ 06:21)    PT/INR - ( 27 Mar 2021 16:48 )   PT: 12.3 sec;   INR: 1.06 ratio         PTT - ( 29 Mar 2021 05:47 )  PTT:71.0 sec      CARDIAC MARKERS ( 28 Mar 2021 06:13 )  x     / 0.05 ng/mL / x     / x     / x      CARDIAC MARKERS ( 27 Mar 2021 19:01 )  x     / 0.16 ng/mL / x     / x     / x      CARDIAC MARKERS ( 27 Mar 2021 16:47 )  x     / 0.03 ng/mL / x     / x     / x              19:00 - VBG - pH: 7.29  | pCO2: 58    | pO2: 34    | Lactate: 1.8      ABG - ( 27 Mar 2021 18:26 )  pH, Arterial: 7.42  pH, Blood: x     /  pCO2: 35    /  pO2: 127   / HCO3: 24    / Base Excess: -1.1  /  SaO2: 100                             Culture - Blood (collected 27 Mar 2021 21:08)  Source: .Blood Blood-Peripheral  Preliminary Report (29 Mar 2021 23:01):    No growth at 48 hours    Culture - Blood (collected 27 Mar 2021 21:08)  Source: .Blood Blood-Peripheral  Preliminary Report (29 Mar 2021 23:01):    No growth at 48 hours      RADIOLOGY & ADDITIONAL TESTS:

## 2021-03-30 NOTE — PROGRESS NOTE ADULT - ATTENDING COMMENTS
Patient was seen and examined at bedside and noted to be doing well with no complaints of chest pain or shortness of breath with ambulation   Telemetry sinus rhythm with evidence of sinus tachycardia HR range  bpm   Patient was s/p mechanical thrombectomy     Labs: reviewed     Dx: Extensive bilateral pulmonary emboli with DVT of the R PT / L PT and L peroneal, provoked in the setting of recent orthopedic procedure, Obesity (BMI 37.6),   - patient is doing well post thrombectomy with no complaints of chest pain or shortness of breath upon exertion   - continue with Eliquis 10mg po q 12hrs for 7 days and then 5mg po q 12hrs for 3 months   - patient will need follow up echo today to assess for any evidence of RV function and size   - discussed compliance with AC and follow up with cardiologist   - patient has his own cardiologist who did his preoperative cardiovascular work up and discussed will follow up with them  - post echocardiogram patient can be discharged home     Thank you for allowing me to participate in care of your patient.   Please call as needed.    Stacie Storm D.O.   Cardiology/Vascular Cardiology -[facility]   Telephone # [phone]

## 2021-03-30 NOTE — PROGRESS NOTE ADULT - ASSESSMENT
49 y/o male with PMH of Obesity, HLD , C6-C7 fractures, OA and cervical radiculopathy s/p a Cervical corpectomy on 3/8/2021 who presents to the hospital with a 2 day hx of sob and tachycardia.  He has been using his Apple watch and observing the his heart rate has been elevated with ambulation with sob. Today he was sitting on  became very sob and was pre syncopal Ems was called.  Presented to the ER  with sinus tachycardia and 02 sat was 90 on oxygen  CT angio revealed Extensive bilateral pulmonary emboli with suggestion of right heart strain. Possible right lower lobe pulmonary infarct  Trop 0.3  [id number] and lactate is pending.   Started on heparin . Echo is pending as well as venous dopplers/ + Dvt right leg  Echo  Right heart dysjunction and now is s/p thrombectomy using Inari device, large quantify of thrombus removed from left and right pulmonary arterial system.  bnp down to 622 and patient has dermatically improved.      PULMONARY EMBOLISM WITH RIGHT HEART STRAIN -> TROP NORMAL : 1611 pg/mL -> Provoked by recent surgery  now s/p thrombectomy bnp now 622  Transitioned to eliquis  On RA and sat in the low 90's   Repeat echo is pending if ok d/c later today  Groin instructions given to patient  To follow up with Dr Storm    S/P CERVICAL CORPECTOMY  Incision healed  tolerating heparin    CARDIAC MEDS  apixaban 10 milliGRAM(s) Oral every 12 hours  atorvastatin 20 milliGRAM(s) Oral at bedtime

## 2021-03-30 NOTE — PROGRESS NOTE ADULT - REASON FOR ADMISSION
PE
Right Heart Cath/ Pulmonary Angiography/ Mechanical Thrombectomy (INARI)
Pulmonary embolism
DVT/PE- INARI Mechanical Thrombectomy

## 2021-03-31 PROBLEM — E66.9 OBESITY, UNSPECIFIED: Chronic | Status: ACTIVE | Noted: 2021-03-27

## 2021-03-31 PROBLEM — E78.5 HYPERLIPIDEMIA, UNSPECIFIED: Chronic | Status: ACTIVE | Noted: 2021-03-27

## 2021-04-01 LAB
CULTURE RESULTS: SIGNIFICANT CHANGE UP
CULTURE RESULTS: SIGNIFICANT CHANGE UP
SPECIMEN SOURCE: SIGNIFICANT CHANGE UP
SPECIMEN SOURCE: SIGNIFICANT CHANGE UP

## 2021-04-05 ENCOUNTER — TRANSCRIPTION ENCOUNTER (OUTPATIENT)
Age: 46
End: 2021-04-05

## 2021-04-06 ENCOUNTER — NON-APPOINTMENT (OUTPATIENT)
Age: 46
End: 2021-04-06

## 2021-04-06 ENCOUNTER — APPOINTMENT (OUTPATIENT)
Dept: ORTHOPEDIC SURGERY | Facility: CLINIC | Age: 46
End: 2021-04-06
Payer: COMMERCIAL

## 2021-04-06 VITALS
HEIGHT: 71 IN | HEART RATE: 85 BPM | DIASTOLIC BLOOD PRESSURE: 77 MMHG | BODY MASS INDEX: 37.1 KG/M2 | SYSTOLIC BLOOD PRESSURE: 129 MMHG | WEIGHT: 265 LBS

## 2021-04-06 VITALS — TEMPERATURE: 97.1 F

## 2021-04-06 PROCEDURE — 72040 X-RAY EXAM NECK SPINE 2-3 VW: CPT

## 2021-04-06 PROCEDURE — 99024 POSTOP FOLLOW-UP VISIT: CPT

## 2021-04-06 NOTE — HISTORY OF PRESENT ILLNESS
[___ Weeks Post Op] : [unfilled] weeks post op [Clean/Dry/Intact] : clean, dry and intact [Healed] : healed [Neuro Intact] : an unremarkable neurological exam [Vascular Intact] : ~T peripheral vascular exam normal [Xray (Date:___)] : [unfilled] Xray -  [Doing Well] : is doing well [Excellent Pain Control] : has excellent pain control [No Sign of Infection] : is showing no signs of infection [Chills] : no chills [Fever] : no fever [Erythema] : not erythematous [Discharge] : absent of discharge [Swelling] : not swollen [Dehiscence] : not dehisced [de-identified] : 1 week S/p anterior cervical corpectomy at C6 and C7, with cage and interbody from C5 - T1, posterior cervical fusion from C4 - T2. DOS: 03- [de-identified] : 46 year old M 1 week S/p anterior cervical corpectomy at C6 and C7, with cage and interbody from C5 - T1, posterior cervical fusion from C4 - T2. Patient is very pleased with his post surgery outcome and states his pre operative symptoms, including his myelopathy and UE radiculopathy, are greatly improved. He has been taking Tylenol for pain. He recently was diagnosed with a pulmonary embolism is now on anti-coagulation status post a thrombectomy patient states his signs and symptoms of pain shortness of breath or resolved.\par \par  [de-identified] : constitutional- No acute distress\par neurologic- no LE radiculitis.\par skin- posterior and anterior incision dry clean and intact. The incision was well healed. Skin staple and sutures were removed. \par musculoskeletal - motor strength is 5/5.  [de-identified] : Xray of a cervical spine taken 03/16/2021 demonstrates S/p  anterior cervical corpectomy at C6 and C7, with cage and interbody from C5 - T1, posterior cervical fusion from C4 - T2. Surgical implants appear well positioned. [de-identified] : I informed the patient he can walk unlimited and shower. I informed him he can shower mainly from the front and he should try not to over saturate the posterior incision. I advised the patient to reframe from repetitive bending, lifting, or twisting as well as to reframe from lifting more than 10 - 15 pounds. Driving ability will be evaluated at the one month post operative visit, at this time he should reframe from driving, he should also be wearing his cervical collar while he is in a vehicle. A skin staple was successfully removed. I advised the patient to reframe from taking ibuprofen/ Aleve /Advil as these may reduce the rate of fusion. The patient will follow up in 6 weeks for repeat clinical assessment. With regard to full work of her hypercoagulable state recommend following up with hematology oncology this can be best directed to his PCP and her cardiologist who has him on anticoagulation for his pulmonary embolism  [de-identified] : 1 Month S/p anterior cervical corpectomy at C6 and C7 with cage and interbody from C5 - T1, posterior cervical fusion from C4 - T2. \par Hypercoagulable state with a diagnosis of a pulmonary embolism

## 2021-04-20 ENCOUNTER — APPOINTMENT (OUTPATIENT)
Dept: CARDIOLOGY | Facility: CLINIC | Age: 46
End: 2021-04-20
Payer: COMMERCIAL

## 2021-04-20 ENCOUNTER — NON-APPOINTMENT (OUTPATIENT)
Age: 46
End: 2021-04-20

## 2021-04-20 VITALS
WEIGHT: 265 LBS | HEIGHT: 71 IN | HEART RATE: 84 BPM | BODY MASS INDEX: 37.1 KG/M2 | DIASTOLIC BLOOD PRESSURE: 80 MMHG | OXYGEN SATURATION: 96 % | SYSTOLIC BLOOD PRESSURE: 138 MMHG

## 2021-04-20 PROCEDURE — 99072 ADDL SUPL MATRL&STAF TM PHE: CPT

## 2021-04-20 PROCEDURE — 99214 OFFICE O/P EST MOD 30 MIN: CPT | Mod: 25

## 2021-04-20 PROCEDURE — 93000 ELECTROCARDIOGRAM COMPLETE: CPT

## 2021-04-20 RX ORDER — ASPIRIN 325 MG/1
325 TABLET, COATED ORAL
Qty: 30 | Refills: 0 | Status: DISCONTINUED | COMMUNITY
Start: 2021-03-10 | End: 2021-04-20

## 2021-04-22 ENCOUNTER — NON-APPOINTMENT (OUTPATIENT)
Age: 46
End: 2021-04-22

## 2021-04-22 NOTE — HISTORY OF PRESENT ILLNESS
[FreeTextEntry1] : Pt is a 46 PMH HLD, BMI 37.  Pt had cervical spine surgery 03/08/2021.  A few weeks later he was having a hard time breathing and passed out, was taken to [facility] and found to have PE s/p mechanical thrombectomy.  TTE showed RVE with decreased function, sev pHTN.  He states that since then he has been feeling much better - denies CP, SOB, diaphoresis, palpitations, dizziness, syncope, LE edema, PND, orthopnea.  He is on Eliquis without complications\par COVID vaccine 03/2021 Pfizer\par \par PMH: HLD, PE after cervical surgery 03/2021, BMI 37\par Smoking status: never\par social ETOH\par no drug use\par Current exercise: none\par Daily water intake:\par Daily caffeine intake:\par OTC medications: \par Family hx: parents HTN/HLD/DM\par Previous cardiac testing:\par Previous hospitalizations:\par

## 2021-04-23 ENCOUNTER — APPOINTMENT (OUTPATIENT)
Dept: PULMONOLOGY | Facility: CLINIC | Age: 46
End: 2021-04-23
Payer: COMMERCIAL

## 2021-04-23 VITALS
SYSTOLIC BLOOD PRESSURE: 134 MMHG | TEMPERATURE: 98 F | DIASTOLIC BLOOD PRESSURE: 80 MMHG | OXYGEN SATURATION: 98 % | HEART RATE: 75 BPM | WEIGHT: 265 LBS | HEIGHT: 71 IN | BODY MASS INDEX: 37.1 KG/M2 | RESPIRATION RATE: 16 BRPM

## 2021-04-23 PROCEDURE — 99072 ADDL SUPL MATRL&STAF TM PHE: CPT

## 2021-04-23 PROCEDURE — 99204 OFFICE O/P NEW MOD 45 MIN: CPT

## 2021-04-23 RX ORDER — PANTOPRAZOLE 40 MG/1
40 TABLET, DELAYED RELEASE ORAL
Qty: 21 | Refills: 0 | Status: DISCONTINUED | COMMUNITY
Start: 2021-03-10 | End: 2021-04-23

## 2021-04-23 RX ORDER — OXYCODONE 5 MG/1
5 TABLET ORAL
Qty: 10 | Refills: 0 | Status: DISCONTINUED | COMMUNITY
Start: 2021-03-10 | End: 2021-04-23

## 2021-04-23 RX ORDER — DEXAMETHASONE 4 MG/1
4 TABLET ORAL
Qty: 21 | Refills: 0 | Status: DISCONTINUED | COMMUNITY
Start: 2021-03-10 | End: 2021-04-23

## 2021-04-23 RX ORDER — METHOCARBAMOL 750 MG/1
750 TABLET, FILM COATED ORAL
Qty: 21 | Refills: 0 | Status: DISCONTINUED | COMMUNITY
Start: 2021-03-16 | End: 2021-04-23

## 2021-04-23 RX ORDER — MELOXICAM 15 MG/1
15 TABLET ORAL
Qty: 30 | Refills: 1 | Status: DISCONTINUED | COMMUNITY
Start: 2021-03-11 | End: 2021-04-23

## 2021-04-23 RX ORDER — NAPROXEN 500 MG/1
500 TABLET ORAL
Qty: 20 | Refills: 0 | Status: DISCONTINUED | COMMUNITY
Start: 2021-03-11 | End: 2021-04-23

## 2021-04-23 NOTE — HISTORY OF PRESENT ILLNESS
[Snoring] : snoring [Fatigue] : fatigue [Hypersomnolence] : hypersomnolence [Awakes with Dry Mouth] : does not awaken with dry mouth [Awakes with Headache] : does not awaken with headache [Witnessed Apneas] : no witnessed apneas

## 2021-04-23 NOTE — PHYSICAL EXAM
[No Acute Distress] : no acute distress [Well Nourished] : well nourished [Well Groomed] : well groomed [No Deformities] : no deformities [Well Developed] : well developed [Supple] : supple [No Neck Mass] : no neck mass [No JVD] : no jvd [Normal S1, S2] : normal s1, s2 [No Murmurs] : no murmurs [No Resp Distress] : no resp distress [No Acc Muscle Use] : no acc muscle use [Clear to Auscultation Bilaterally] : clear to auscultation bilaterally [No Abnormalities] : no abnormalities [No Masses] : no masses [Soft] : soft [No Hernias] : no hernias [Normal Bowel Sounds] : normal bowel sounds [Normal Gait] : normal gait [No Clubbing] : no clubbing [No Edema] : no edema [No Rash] : no rash [No Focal Deficits] : no focal deficits [No Motor Deficits] : no motor deficits [Normal Mood] : normal mood [Normal Affect] : normal affect [TextBox_2] : obese male no distress speaking full sentences  [TextBox_11] : moist large tongue  moist   long palate  mp  4 [TextBox_44] : well healed scar  anterior and posterior

## 2021-04-23 NOTE — REVIEW OF SYSTEMS
[Numbness] : numbness [Obesity] : obesity [Fever] : no fever [Fatigue] : no fatigue [Chills] : no chills [Poor Appetite] : no poor appetite [Dry Eyes] : no dry eyes [Epistaxis] : no epistaxis [Sore Throat] : no sore throat [Eye Irritation] : no eye irritation [Postnasal Drip] : no postnasal drip [Mouth Ulcers] : no mouth ulcers [Poor Dentition] : no poor dentition [Cough] : no cough [Hemoptysis] : no hemoptysis [Sputum] : no sputum [Dyspnea] : no dyspnea [Pleuritic Pain] : no pleuritic pain [Wheezing] : no wheezing [A.M. Dry Mouth] : no a.m. dry mouth [Chest Discomfort] : no chest discomfort [Edema] : no edema [Leg Cramps] : no leg cramps [Orthopnea] : no orthopnea [Hay Fever] : no hay fever [Nasal Discharge] : no nasal discharge [GERD] : no gerd [Abdominal Pain] : no abdominal pain [Nausea] : no nausea [Vomiting] : no vomiting [Diarrhea] : no diarrhea [Bleeding] : no bleeding [Frequency] : no dysuria [Urgency] : no frequency [Back Pain] : no back pain [Chronic Pain] : no chronic pain [Rash] : no rash [Ulcerations] : no ulcerations [Anemia] : no anemia [Headache] : no headache [Seizures] : no seizures [Dizziness] : no dizziness [Memory Loss] : no memory loss [Involuntary Movements] : no involuntary movements [Depression] : no depression [Anxiety] : no anxiety [Panic Attacks] : no panic attacks [Diabetes] : no diabetes [Thyroid Problem] : no thyroid problem [TextBox_119] : cervical surgery recent  left hand some numbness improved now intermittent

## 2021-04-23 NOTE — ASSESSMENT
[FreeTextEntry1] : 45y/o male with \par \par 1- 3/2021 bilateral  PE /  Right heart failure  s/p  thrombectomy  risk factor post operative \par 2- obesity with snoring \par \par \par \par Recommendations\par 1- eliquis at least six months\par 2- hematology input\par 3- repeat echo  with cardiology follow up \par 4- sleep evaluation \par 5- weight loss and exercise program \par 6- repeat ctpa after  above  f/u pulmonary infarct vs other process\par 7-vaccinations   +  covid  pfizer\par \par return in three months\par agrees to plan

## 2021-05-18 ENCOUNTER — APPOINTMENT (OUTPATIENT)
Dept: ORTHOPEDIC SURGERY | Facility: CLINIC | Age: 46
End: 2021-05-18
Payer: COMMERCIAL

## 2021-05-18 PROCEDURE — 99024 POSTOP FOLLOW-UP VISIT: CPT

## 2021-05-18 PROCEDURE — 72040 X-RAY EXAM NECK SPINE 2-3 VW: CPT

## 2021-05-22 NOTE — HISTORY OF PRESENT ILLNESS
[___ Weeks Post Op] : [unfilled] weeks post op [Clean/Dry/Intact] : clean, dry and intact [Healed] : healed [Neuro Intact] : an unremarkable neurological exam [Vascular Intact] : ~T peripheral vascular exam normal [Xray (Date:___)] : [unfilled] Xray -  [Doing Well] : is doing well [Excellent Pain Control] : has excellent pain control [No Sign of Infection] : is showing no signs of infection [Chills] : no chills [Fever] : no fever [Erythema] : not erythematous [Discharge] : absent of discharge [Swelling] : not swollen [Dehiscence] : not dehisced [de-identified] : 2 months S/p anterior cervical corpectomy at C6 and C7, with cage and interbody from C5 - T1, posterior cervical fusion from C4 - T2. DOS: 03- [de-identified] : 46 year old M 2 months S/p anterior cervical corpectomy at C6 and C7, with cage and interbody from C5 - T1, posterior cervical fusion from C4 - T2. Patient is very pleased with his post surgery outcome and states his pre operative symptoms, including his myelopathy and UE radiculopathy, are greatly improved. He has been taking Tylenol for pain. He recently was diagnosed with a pulmonary embolism is now on anti-coagulation status post a thrombectomy patient states his signs and symptoms of pain shortness of breath or resolved.\par \par  [de-identified] : constitutional- No acute distress\par neurologic- no LE radiculitis.\par skin- posterior and anterior incision dry clean and intact. Incisions well healed\par musculoskeletal - motor strength is 5/5.  [de-identified] : Xray of a cervical spine taken 03/16/2021 demonstrates S/p  anterior cervical corpectomy at C6 and C7, with cage and interbody from C5 - T1, posterior cervical fusion from C4 - T2. Surgical implants appear well positioned.\par \par Surgeon cervical spine taken on 518 demonstrates well-positioned cervical implants. No acute change compared to previous x-rays [de-identified] : 2 Months S/p anterior cervical corpectomy at C6 and C7 with cage and interbody from C5 - T1, posterior cervical fusion from C4 - T2. \par Hypercoagulable state with a diagnosis of a pulmonary embolism [de-identified] : Patient has been referred to physical therapy for decreased pain modalities, cervical stretching, soft tissue modalities, and physical modalities.  Patient was instructed to start home exercises, cervical stretching, and a sheet was provided. Scar massage was advised. Follow ups with Pulmonary was advised for PE management. The patient will follow up in 2 months for a repeat clinical assessment.

## 2021-06-07 ENCOUNTER — RX RENEWAL (OUTPATIENT)
Age: 46
End: 2021-06-07

## 2021-06-10 ENCOUNTER — APPOINTMENT (OUTPATIENT)
Dept: CARDIOLOGY | Facility: CLINIC | Age: 46
End: 2021-06-10

## 2021-06-11 ENCOUNTER — APPOINTMENT (OUTPATIENT)
Age: 46
End: 2021-06-11
Payer: COMMERCIAL

## 2021-06-11 ENCOUNTER — OUTPATIENT (OUTPATIENT)
Dept: OUTPATIENT SERVICES | Facility: HOSPITAL | Age: 46
LOS: 1 days | End: 2021-06-11
Payer: COMMERCIAL

## 2021-06-11 DIAGNOSIS — Z98.1 ARTHRODESIS STATUS: Chronic | ICD-10-CM

## 2021-06-11 DIAGNOSIS — G47.33 OBSTRUCTIVE SLEEP APNEA (ADULT) (PEDIATRIC): ICD-10-CM

## 2021-06-11 DIAGNOSIS — Z87.81 PERSONAL HISTORY OF (HEALED) TRAUMATIC FRACTURE: Chronic | ICD-10-CM

## 2021-06-11 PROCEDURE — 95810 POLYSOM 6/> YRS 4/> PARAM: CPT | Mod: 26

## 2021-06-11 PROCEDURE — 95810 POLYSOM 6/> YRS 4/> PARAM: CPT

## 2021-07-07 ENCOUNTER — APPOINTMENT (OUTPATIENT)
Dept: PULMONOLOGY | Facility: CLINIC | Age: 46
End: 2021-07-07
Payer: COMMERCIAL

## 2021-07-07 VITALS
DIASTOLIC BLOOD PRESSURE: 71 MMHG | SYSTOLIC BLOOD PRESSURE: 126 MMHG | HEART RATE: 88 BPM | WEIGHT: 274 LBS | OXYGEN SATURATION: 96 % | BODY MASS INDEX: 38.22 KG/M2

## 2021-07-07 PROCEDURE — 99214 OFFICE O/P EST MOD 30 MIN: CPT

## 2021-07-07 PROCEDURE — 99072 ADDL SUPL MATRL&STAF TM PHE: CPT

## 2021-07-07 NOTE — ASSESSMENT
[FreeTextEntry1] : 47y/o male with \par \par 1- 3/2021 bilateral  PE /  Right heart failure  s/p  thrombectomy  risk factor post operative \par 2- obesity \par 3- 6/2021 severe JIA \par \par \par \par \par Recommendations\par 1- eliquis at least six months\par 2- hematology input\par 3- repeat echo  with cardiology follow up \par 4- autopap ordered   to see Dr Walker \par 5- weight loss and exercise program \par 6- repeat ctpa once finished eliquis \par 7-vaccinations   +  covid  pfizer\par \par return in three months\par agrees to plan

## 2021-07-07 NOTE — HISTORY OF PRESENT ILLNESS
[Former] : former [< 30 pack-years] : < 30 pack-years [Never] : never [TextBox_11] : 0.5 [TextBox_13] : 2

## 2021-07-07 NOTE — PHYSICAL EXAM
[IV] : Mallampati Class: IV [Supple] : supple [No Neck Mass] : no neck mass [No JVD] : no jvd [Normal S1, S2] : normal s1, s2 [No Murmurs] : no murmurs [Clear to Auscultation Bilaterally] : clear to auscultation bilaterally [No Masses] : no masses [Soft] : soft [Normal Bowel Sounds] : normal bowel sounds [No Clubbing] : no clubbing [No Edema] : no edema [No Rash] : no rash [No Focal Deficits] : no focal deficits [Normal Affect] : normal affect [TextBox_2] : well built  male no distress speaking full sentences   no cough  [TextBox_11] : moist  no leisons

## 2021-07-07 NOTE — REVIEW OF SYSTEMS
[Obesity] : obesity [Fever] : no fever [Chills] : no chills [Epistaxis] : no epistaxis [Nasal Congestion] : no nasal congestion [Cough] : no cough [Hemoptysis] : no hemoptysis [Chest Tightness] : no chest tightness [Sputum] : no sputum [Dyspnea] : no dyspnea [Pleuritic Pain] : no pleuritic pain [Wheezing] : no wheezing [A.M. Dry Mouth] : no a.m. dry mouth [SOB on Exertion] : no sob on exertion [GERD] : no gerd [Abdominal Pain] : no abdominal pain [Nausea] : no nausea [Vomiting] : no vomiting [Diarrhea] : no diarrhea [Constipation] : no constipation [Bleeding] : no bleeding [Nocturia] : no nocturia

## 2021-07-15 ENCOUNTER — APPOINTMENT (OUTPATIENT)
Dept: CARDIOLOGY | Facility: CLINIC | Age: 46
End: 2021-07-15
Payer: COMMERCIAL

## 2021-07-15 ENCOUNTER — NON-APPOINTMENT (OUTPATIENT)
Age: 46
End: 2021-07-15

## 2021-07-15 VITALS
HEIGHT: 71 IN | HEART RATE: 78 BPM | BODY MASS INDEX: 38.36 KG/M2 | DIASTOLIC BLOOD PRESSURE: 80 MMHG | SYSTOLIC BLOOD PRESSURE: 122 MMHG | WEIGHT: 274 LBS | OXYGEN SATURATION: 94 %

## 2021-07-15 PROCEDURE — 93000 ELECTROCARDIOGRAM COMPLETE: CPT

## 2021-07-15 PROCEDURE — 99072 ADDL SUPL MATRL&STAF TM PHE: CPT

## 2021-07-15 PROCEDURE — 93306 TTE W/DOPPLER COMPLETE: CPT

## 2021-07-15 PROCEDURE — 99213 OFFICE O/P EST LOW 20 MIN: CPT | Mod: 25

## 2021-07-15 NOTE — DISCUSSION/SUMMARY
[FreeTextEntry1] : Pt is a 46 PMH HLD, BMI 37.  Pt had cervical spine surgery 03/08/2021.  A few weeks later he was having a hard time breathing and passed out, was taken to [facility] and found to have PE s/p mechanical thrombectomy.  TTE showed RVE with decreased function, sev pHTN. \par \par PE, R heart failure, pHTN:\par TTE today shows normal LV and RV function, RVSP 13mmHg\par c/w Eliquis - no bleeding complications\par \par HLD:\par c/w statin\par check labs\par \par JIA:\par following with pulm - about to start treatment\par \par Pt will return in 6 mnths or sooner as needed but I encouraged communication via phone or portal if necessary. \par The described plan was discussed with the pt.  All questions and concerns were addressed to the best of my knowledge.

## 2021-07-16 LAB
ALBUMIN SERPL ELPH-MCNC: 4.7 G/DL
ALP BLD-CCNC: 107 U/L
ALT SERPL-CCNC: 23 U/L
ANION GAP SERPL CALC-SCNC: 13 MMOL/L
AST SERPL-CCNC: 23 U/L
BILIRUB SERPL-MCNC: 0.5 MG/DL
BUN SERPL-MCNC: 16 MG/DL
CALCIUM SERPL-MCNC: 9.9 MG/DL
CHLORIDE SERPL-SCNC: 105 MMOL/L
CHOLEST SERPL-MCNC: 188 MG/DL
CK SERPL-CCNC: 258 U/L
CO2 SERPL-SCNC: 22 MMOL/L
CREAT SERPL-MCNC: 1.12 MG/DL
GLUCOSE SERPL-MCNC: 86 MG/DL
HDLC SERPL-MCNC: 55 MG/DL
LDLC SERPL CALC-MCNC: 100 MG/DL
NONHDLC SERPL-MCNC: 132 MG/DL
POTASSIUM SERPL-SCNC: 4.5 MMOL/L
PROT SERPL-MCNC: 7.4 G/DL
SODIUM SERPL-SCNC: 140 MMOL/L
TRIGL SERPL-MCNC: 160 MG/DL

## 2021-07-19 ENCOUNTER — NON-APPOINTMENT (OUTPATIENT)
Age: 46
End: 2021-07-19

## 2021-09-21 ENCOUNTER — APPOINTMENT (OUTPATIENT)
Dept: ORTHOPEDIC SURGERY | Facility: CLINIC | Age: 46
End: 2021-09-21
Payer: COMMERCIAL

## 2021-09-21 VITALS
WEIGHT: 274 LBS | HEART RATE: 66 BPM | BODY MASS INDEX: 38.36 KG/M2 | HEIGHT: 71 IN | DIASTOLIC BLOOD PRESSURE: 79 MMHG | SYSTOLIC BLOOD PRESSURE: 131 MMHG

## 2021-09-21 DIAGNOSIS — Z87.81 PERSONAL HISTORY OF (HEALED) TRAUMATIC FRACTURE: ICD-10-CM

## 2021-09-21 PROCEDURE — 99214 OFFICE O/P EST MOD 30 MIN: CPT

## 2021-09-21 PROCEDURE — 72040 X-RAY EXAM NECK SPINE 2-3 VW: CPT

## 2021-09-21 NOTE — PHYSICAL EXAM
[Obese] : obese [Poor Appearance] : well-appearing [Acute Distress] : not in acute distress [de-identified] : CONSTITUTIONAL: Patient is a very pleasant individual who is well-nourished and appears stated age. \par PSYCHIATRIC: Alert and oriented times three and in no apparent distress, and participates with orthopedic evaluation well.\par HEAD: Atraumatic and nonsyndromic in appearance.\par EENT: No thyromegaly, EOMI.\par RESPIRATORY: Respiratory rate is regular, not dyspneic on examination.\par LYMPHATICS: There is no cervical or axillary lymphadenopathy.\par INTEGUMENTARY: Skin is clean, dry, and intact about the bilateral upper extremities and cervical spine. \par VASCULAR: There is brisk capillary refill about the bilateral upper extremities and radial pulses are 2/4. \par NEUROLOGIC: Negative L'hirmitte, negative Spurling’s sign. There are no pathologic reflexes. There is no decrease in sensation of the bilateral upper extremities on Wartenberg pinwheel examination. Deep tendon reflexes are well-maintained at +2/4 of the bilateral upper extremities and are symmetric.\par MUSCULOSKELETAL: There is no visible muscular atrophy. Manual motor strength is well maintained in the bilateral upper extremities. Cervical range of motion is well maintained. The patient ambulates in a non-myelopathic manner. Normal secondary orthopaedic exam of bilateral shoulders, elbows and hands. Elbow flexion and extension, wrist extension, finger flexion and abduction are well maintained. Mild and intermittent Posterior cervical myositis on the left.  [de-identified] : Xray of a cervical spine taken 03/16/2021 demonstrates S/p anterior cervical corpectomy at C6 and C7, with cage and interbody from C5 - T1, posterior cervical fusion from C4 - T2. Surgical implants appear well positioned.\par \par Xray of a cervical spine taken 09/21/2021 demonstrates S/p anterior cervical corpectomy at C6 and C7, with cage and interbody from C5 - T1, posterior cervical fusion from C4 - T2. Surgical implants appear well positioned.

## 2021-09-21 NOTE — ADDENDUM
[FreeTextEntry1] : Documented by Kam Ybarra acting as a scribe for Lili Burns on 09/21/2021 . All medical record entries made by the Scribe were at my, Lili Burns , direction and personally dictated by me on 09/21/2021  . I have reviewed the chart and agree that the record accurately reflects my personal performance of the history, physical exam, assessment and plan. I have also personally directed, reviewed, and agreed with the chart.

## 2021-09-21 NOTE — DISCUSSION/SUMMARY
[de-identified] : We talked about the nature of the condition and treatment options. Anticipatory guidance regarding cervical tightness was given.  Patient was instructed to start home exercises, cervical stretching, and a sheet was provided. Patient to follow up on a PRN basis if he wishes to undergo a trigger point injections. \par \par Prior to appointment and during encounter with patient extensive medical records were reviewed including but not limited to, hospital records, out patient records, imaging results, and lab data. During this appointment the patient was examined, diagnoses were discussed and explained in a face to face manner. In addition extensive time was spent reviewing aforementioned diagnostic studies. Counseling including abnormal image results, differential diagnoses, treatment options, risk and benefits, lifestyle changes, current condition, and current medications was performed. Patient's comments, questions, and concerns were address and patient verbalized understanding. Based on this patient's presentation at our office, which is an orthopedic spine surgeon's office, this patient inherently / intrinsically has a risk, however minute, of developing  issues such as Cauda equina syndrome, bowel and bladder changes, or progression of motor or neurological deficits such as paralysis which may be permanent.

## 2021-09-21 NOTE — HISTORY OF PRESENT ILLNESS
[de-identified] : 46 year old M Presents for follow up evaluation S/p anterior cervical corpectomy at C6 and C7, with cage and interbody from C5 - T1, posterior cervical fusion from C4 - T2. He states his UEs feeling much improved, he strength has returned as well. He feels a intermittent cervical tightness on the left.  [Ataxia] : no ataxia [Incontinence] : no incontinence [Loss of Dexterity] : good dexterity [Urinary Ret.] : no urinary retention

## 2021-11-03 ENCOUNTER — TRANSCRIPTION ENCOUNTER (OUTPATIENT)
Age: 46
End: 2021-11-03

## 2021-11-08 ENCOUNTER — APPOINTMENT (OUTPATIENT)
Dept: PULMONOLOGY | Facility: CLINIC | Age: 46
End: 2021-11-08

## 2021-11-10 ENCOUNTER — TRANSCRIPTION ENCOUNTER (OUTPATIENT)
Age: 46
End: 2021-11-10

## 2021-11-10 ENCOUNTER — APPOINTMENT (OUTPATIENT)
Dept: PULMONOLOGY | Facility: CLINIC | Age: 46
End: 2021-11-10

## 2021-11-15 ENCOUNTER — RESULT REVIEW (OUTPATIENT)
Age: 46
End: 2021-11-15

## 2021-11-15 ENCOUNTER — APPOINTMENT (OUTPATIENT)
Dept: RADIOLOGY | Facility: CLINIC | Age: 46
End: 2021-11-15
Payer: COMMERCIAL

## 2021-11-15 ENCOUNTER — APPOINTMENT (OUTPATIENT)
Dept: FAMILY MEDICINE | Facility: CLINIC | Age: 46
End: 2021-11-15
Payer: COMMERCIAL

## 2021-11-15 ENCOUNTER — OUTPATIENT (OUTPATIENT)
Dept: OUTPATIENT SERVICES | Facility: HOSPITAL | Age: 46
LOS: 1 days | End: 2021-11-15
Payer: COMMERCIAL

## 2021-11-15 VITALS
HEIGHT: 71 IN | BODY MASS INDEX: 38.5 KG/M2 | TEMPERATURE: 97.8 F | HEART RATE: 86 BPM | SYSTOLIC BLOOD PRESSURE: 106 MMHG | WEIGHT: 275 LBS | OXYGEN SATURATION: 98 % | DIASTOLIC BLOOD PRESSURE: 80 MMHG

## 2021-11-15 DIAGNOSIS — M54.9 DORSALGIA, UNSPECIFIED: ICD-10-CM

## 2021-11-15 DIAGNOSIS — Z87.81 PERSONAL HISTORY OF (HEALED) TRAUMATIC FRACTURE: Chronic | ICD-10-CM

## 2021-11-15 DIAGNOSIS — Z98.1 ARTHRODESIS STATUS: Chronic | ICD-10-CM

## 2021-11-15 PROCEDURE — 72100 X-RAY EXAM L-S SPINE 2/3 VWS: CPT | Mod: 26

## 2021-11-15 PROCEDURE — 72100 X-RAY EXAM L-S SPINE 2/3 VWS: CPT

## 2021-11-15 PROCEDURE — 99213 OFFICE O/P EST LOW 20 MIN: CPT

## 2021-11-15 NOTE — PHYSICAL EXAM
[No CVA Tenderness] : no CVA  tenderness [No Spinal Tenderness] : no spinal tenderness [Normal] : affect was normal and insight and judgment were intact [de-identified] : mild paraspinal tenderness

## 2021-11-15 NOTE — ASSESSMENT
[FreeTextEntry1] : Acute back pain: hx of cervical spine disorder, start tylenol prn for pain, start methocarbamol prn for spasm, f/u lumbar xray, will consider PT vs orthopedist after referral\par RTC 2 wks

## 2021-11-15 NOTE — HISTORY OF PRESENT ILLNESS
[FreeTextEntry8] : 47 yo male presents with complaint of low back pain for the past week. he says he was doing yardwork when he noticed low back pain, pain is 6/10 in severity, worse with use. Denies fever, chills, cp, palpitations, sob, nv, heat/cold intolerance, dizziness, melena, hematochezia, muscle weakness, loss of sensation, bowel/bladder incontinence or calf pain.\par

## 2021-11-29 ENCOUNTER — APPOINTMENT (OUTPATIENT)
Dept: PULMONOLOGY | Facility: CLINIC | Age: 46
End: 2021-11-29
Payer: COMMERCIAL

## 2021-11-29 VITALS
BODY MASS INDEX: 38.49 KG/M2 | DIASTOLIC BLOOD PRESSURE: 82 MMHG | OXYGEN SATURATION: 98 % | WEIGHT: 276 LBS | SYSTOLIC BLOOD PRESSURE: 140 MMHG | HEART RATE: 85 BPM

## 2021-11-29 PROCEDURE — 99215 OFFICE O/P EST HI 40 MIN: CPT

## 2021-11-29 RX ORDER — METHOCARBAMOL 750 MG/1
750 TABLET, FILM COATED ORAL 3 TIMES DAILY
Qty: 30 | Refills: 0 | Status: DISCONTINUED | COMMUNITY
Start: 2021-11-15 | End: 2021-11-29

## 2021-11-29 NOTE — DISCUSSION/SUMMARY
[FreeTextEntry1] : \par #1. Continue APAP for severe JIA with an AHI of 40.1\par #2. Diet and exercise for weight loss\par #3. Pt on Eliquis for h/o PE per cardiology; could consider heme evaluation if desired\par #4. Last echo without pulm HTN\par #5. Further pulm w/u and management per Dr. Wadsworth\par #6. Replace equipment as needed; ordered 7/16/21\par #7. Pt had both Covid vaccines; rec flu vaccine\par #8. F/u in 6 months with compliance\par #9. Could consider baseline PFTs\par #10. Reviewed risks of exposure and symptoms of Covid-19 virus, including how the virus is spread and precautions to avoid lev virus.\par \par Patient's questions were answered and patient appears to understand these recommendations

## 2021-11-29 NOTE — REVIEW OF SYSTEMS
[Back Pain] : back pain [Fever] : no fever [Chills] : no chills [Nasal Congestion] : no nasal congestion [Postnasal Drip] : no postnasal drip [Sinus Problems] : no sinus problems [Cough] : no cough [Chest Tightness] : no chest tightness [Sputum] : no sputum [Dyspnea] : no dyspnea [Pleuritic Pain] : no pleuritic pain [Wheezing] : no wheezing [SOB on Exertion] : no sob on exertion [Chest Discomfort] : no chest discomfort [Edema] : no edema [Palpitations] : no palpitations [Syncope] : no syncope [Seasonal Allergies] : no seasonal allergies [GERD] : no gerd [Abdominal Pain] : no abdominal pain [Nausea] : no nausea [Vomiting] : no vomiting [Diarrhea] : no diarrhea [Constipation] : no constipation [Anemia] : no anemia [Headache] : no headache [Seizures] : no seizures [Dizziness] : no dizziness [Numbness] : no numbness [Paralysis] : no paralysis [Confusion] : no confusion [Depression] : no depression [Anxiety] : no anxiety [Diabetes] : no diabetes [Thyroid Problem] : no thyroid problem

## 2021-11-29 NOTE — CONSULT LETTER
[Dear  ___] : Dear  [unfilled], [Consult Letter:] : I had the pleasure of evaluating your patient, [unfilled]. [Please see my note below.] : Please see my note below. [Consult Closing:] : Thank you very much for allowing me to participate in the care of this patient.  If you have any questions, please do not hesitate to contact me. [Sincerely,] : Sincerely, [FreeTextEntry3] : Melchor Garces MD, FCCP, D. ABSM\par Pulmonary and Sleep Medicine\par [facility] [place]

## 2021-11-29 NOTE — HISTORY OF PRESENT ILLNESS
[Former] : former [Never] : never [None] : No associated symptoms are reported [Good Compliance] : good compliance with treatment [Good Tolerance] : good tolerance of treatment [Good Symptom Control] : good symptom control [Follow-Up - Routine Clinic] : a routine clinic follow-up of [Excess Weight] : excess weight [Currently Experiencing] : The patient is currently experiencing symptoms. [Dyspnea] : dyspnea [Knee Pain] : knee pain [Back Pain] : back pain [Joint Pain] : joint pain [Low Calorie Diet] : low calorie diet [Fair Compliance] : fair compliance with treatment [Fair Tolerance] : fair tolerance of treatment [Poor Symptom Control] : poor symptom control [Dyslipidemia] : dyslipidemia [Sleep Apnea] : sleep apnea [High] : high [Low Calorie] : low calorie [Well Balanced Diet] : well balanced meals [Infrequently] : exercises infrequently [Walking] : walking [TextBox_4] : Patient c/o SOBOE but is otherwise without associated respiratory complaints. \par He has a h/o PE for which he is still maintained on Eliquis therapy.\par Pt reports PE about 2 weeks after the 2nd Covid vaccine dose and after neck surgery. Pt s/p medical thrombectomy with improvement in echo findings but with reported pulm HTN though last echo from 7/15/21 was essentially normal without pulm HTN.\par He is s/p PSG and is using CPAP therapy. [TextBox_11] : .5 [TextBox_13] : 2 [YearQuit] : 2015 [de-identified] : APAP

## 2021-11-29 NOTE — PHYSICAL EXAM
Chief complaint: Neck and back pain and headaches  DOI: 06/17/2019  Initial treatment date: 06/17/2019  Last visit date: 12/17/2019  Visit Number of Current Episode: 7    SUBJECTIVE:  Patient presents to the office for treatment of neck and back pain. She rates her pain 7/10 with 10 being worse. Patient reports her right side of neck is painful and has had a headache. She was playing in the snow with her son.     OBJECTIVE:  (C-spine) Cervical spine facet joint function is within normal limits except for her C2/3 and C5/6 facet joints that exhibited limited passive range of motion and segmental restriction with tenderness upon palpation. The following muscles were examined for normal flexibility and tone; right and left upper trapezius muscle: right and left scalene muscle; right and left levator scapulae muscle; deep neck flexor muscle; right and left Sterno cleidomastoid muscle(SCM); right and left suboccipital muscle; these muscles were within normal limits except for her right trapezius muscles, her right scalene muscle and her right suboccipital muscle that exhibited limited flexibility and were hypertonic at rest.    (Thoracic spine) Thoracic spine facet joint function is within normal limits except for her T4/5/6 facet joints that exhibited limited passive range of motion and segmental restriction and tenderness upon palpation; The following muscles were examined for normal flexibility and tone; right and left rhomboid muscle; right and left serratus muscle; left and right latissimus dorsi muscle; These muscles were within normal limits except for her bilateral rhomboid muscles that exhibited limited flexibility and abnormal resting tone.    (Lumbar and Pelvic Spine) Lumbar spine facet joint function is within normal limits. Sacroiliac joint function is within normal limits except for her right Sacroiliac joint that exhibited limited passive range of motion and joint restriction; The following muscles were  examined for flexibility and tone at rest; right and left hip flexor muscle; right and left hip adductor muscle; right and left hip rotator muscle; right and left piriformis muscle; right and left hamstring muscle; right and left Gluteus medius muscle and gluteus chang muscle; right and left quadratus lumborum muscle; left and right Tensor fasciae latae muscle; left and right internal hip rotators muscle; right and left quadriceps muscle. These muscles were found to be within normal limits except for her right piriformis muscle and her bilateral gluteus medius muscles that exhibited limited flexibility and were hypertonic at rest.    Assessment:   1. Cervical segment dysfunction    2. Cervicalgia    3. Thoracic segment dysfunction    4. Pain in thoracic spine    5. Somatic dysfunction of lumbar region    6. Acute bilateral low back pain without sciatica    7. Somatic dysfunction of pelvis region    8. Cervicogenic headache       Patient emotional screening performed: alert, pleasant  Complicating factors / co-morbidities documented: None determined    PLAN:  Patient was evaluated and then treated with manipulation to her cervical and thoracic spine via manual Chiropractic manipulation technique and her lumbar and pelvic spine via side posture technique to improve function and passive range of motion of facet joints. Patient also treated with contract/relax stretch to muscle noted as taut in objective findings to improve flexibility and decrease strain to spinal structures.     Total treatment and examination time today 15 minutes. Patient to return as needed.     On 1/20/2020, IKathrin CT scribed the services personally performed by Srini Palumbo DC.    The documentation recorded by the scribe accurately and completely reflects the service(s) I personally performed and the decisions made by me.                                                               [No Acute Distress] : no acute distress [Well Nourished] : well nourished [Well Developed] : well developed [Low Lying Soft Palate] : low lying soft palate [Enlarged Base of the Tongue] : enlarged base of the tongue [IV] : Mallampati Class: IV [Normal Appearance] : normal appearance [Supple] : supple [Normal Rate/Rhythm] : normal rate/rhythm [Normal S1, S2] : normal s1, s2 [No Murmurs] : no murmurs [No Resp Distress] : no resp distress [No Acc Muscle Use] : no acc muscle use [Normal Rhythm and Effort] : normal rhythm and effort [Clear to Auscultation Bilaterally] : clear to auscultation bilaterally [No Abnormalities] : no abnormalities [Benign] : benign [Not Tender] : not tender [Soft] : soft [No Clubbing] : no clubbing [No Edema] : no edema [No Focal Deficits] : no focal deficits [Oriented x3] : oriented x3 [TextBox_2] : Pt is obese [TextBox_11] : Severe oropharyngeal crowding.

## 2022-01-06 ENCOUNTER — NON-APPOINTMENT (OUTPATIENT)
Age: 47
End: 2022-01-06

## 2022-01-06 ENCOUNTER — APPOINTMENT (OUTPATIENT)
Dept: CARDIOLOGY | Facility: CLINIC | Age: 47
End: 2022-01-06
Payer: COMMERCIAL

## 2022-01-06 VITALS
OXYGEN SATURATION: 98 % | WEIGHT: 280 LBS | HEART RATE: 82 BPM | HEIGHT: 71 IN | SYSTOLIC BLOOD PRESSURE: 124 MMHG | BODY MASS INDEX: 39.2 KG/M2 | DIASTOLIC BLOOD PRESSURE: 80 MMHG

## 2022-01-06 PROCEDURE — 99214 OFFICE O/P EST MOD 30 MIN: CPT

## 2022-01-06 PROCEDURE — 93000 ELECTROCARDIOGRAM COMPLETE: CPT

## 2022-01-06 NOTE — DISCUSSION/SUMMARY
[FreeTextEntry1] : Pt is a 46 PMH HLD, BMI 37.  Pt had cervical spine surgery 03/08/2021.  A few weeks later he was having a hard time breathing and passed out, was taken to [facility] and found to have PE s/p mechanical thrombectomy.  TTE showed RVE with decreased function, sev pHTN. \par \par PE, R heart failure, pHTN:\par TTE 07/2021 normal LV and RV function, RVSP 13mmHg\par He will stop Eliquis \par suggesting hem consult but he does not want to do this \par \par HLD:\par c/w statin\par \par JIA:\par following with pulm, on CPAP\par \par Pt will return in 6 mnths or sooner as needed but I encouraged communication via phone or portal if necessary. \par The described plan was discussed with the pt.  All questions and concerns were addressed to the best of my knowledge.

## 2022-01-06 NOTE — HISTORY OF PRESENT ILLNESS
[FreeTextEntry1] : Pt is a 46 PMH HLD, BMI 37, JIA.  Pt had cervical spine surgery 03/08/2021.  A few weeks later he was having a hard time breathing and passed out, was taken to [facility] and found to have PE s/p mechanical thrombectomy.  TTE showed RVE with decreased function, sev pHTN.   TTE 07/2021 showed normal LV and RV function, RVSP 13mmHg \par He states that since then he has been feeling much better - denies CP, SOB, diaphoresis, palpitations, dizziness, syncope, LE edema, PND, orthopnea.  He is on Eliquis without complications.\par COVID vaccine 03/2021 Pfizer\par COVID+ 11/2021, not hospitalized\par \par PMH: HLD, PE after cervical surgery 03/2021, BMI 37\par Smoking status: never\par social ETOH\par no drug use\par Current exercise: none\par Daily water intake:\par Daily caffeine intake:\par OTC medications: \par Family hx: parents HTN/HLD/DM\par \par

## 2022-01-22 NOTE — H&P PST ADULT - WEIGHT IN LBS
Tendonitis and Tenosynovitis  What are tendonitis and tenosynovitis? Tendons are strong cords of tissue that connect muscles to bones. When a tendon is inflamed, it's called tendonitis. It can happen to any tendon in the body.  An inflamed tendon can c moved  · Swelling from fluid and inflammation  · A grating feeling when moving the joint  The symptoms of tendonitis can seem like other health problems. Talk with your healthcare provider for a diagnosis. How are tendonitis and tenosynovitis diagnosed? steps  Tips to help you get the most from a visit to your healthcare provider:   · Know the reason for your visit and what you want to happen. · Before your visit, write down questions you want answered.   · Bring someone with you to help you ask questions 273.3

## 2022-03-22 ENCOUNTER — APPOINTMENT (OUTPATIENT)
Dept: ORTHOPEDIC SURGERY | Facility: CLINIC | Age: 47
End: 2022-03-22
Payer: COMMERCIAL

## 2022-03-22 VITALS
HEART RATE: 67 BPM | BODY MASS INDEX: 34.02 KG/M2 | HEIGHT: 71 IN | DIASTOLIC BLOOD PRESSURE: 72 MMHG | SYSTOLIC BLOOD PRESSURE: 115 MMHG | WEIGHT: 243 LBS

## 2022-03-22 PROCEDURE — 72040 X-RAY EXAM NECK SPINE 2-3 VW: CPT

## 2022-03-22 PROCEDURE — 99213 OFFICE O/P EST LOW 20 MIN: CPT

## 2022-03-22 NOTE — HISTORY OF PRESENT ILLNESS
[de-identified] : 47 year old M Presents for follow up evaluation 1year S/p anterior posterior cervical surgery for cervical myelopathy. He returned to work two months S/p and remains employed. He is having no balance issues, no motor weakness, fine motor skills maintained. There is intermittent posterior cervical spasm which her remedies with heat and light stretching.  [Ataxia] : no ataxia [Incontinence] : no incontinence [Loss of Dexterity] : good dexterity [Urinary Ret.] : no urinary retention

## 2022-03-22 NOTE — DISCUSSION/SUMMARY
[de-identified] : We talked about the nature of the condition and treatment options. Anticipatory guidance regarding posterior stiffness was given. Continue with ADLs as tolerated at this time. The patient will follow up in 1 year for a repeat clinical assessment. \par \par Prior to appointment and during encounter with patient extensive medical records were reviewed including but not limited to, hospital records, out patient records, imaging results, and lab data. During this appointment the patient was examined, diagnoses were discussed and explained in a face to face manner. In addition extensive time was spent reviewing aforementioned diagnostic studies. Counseling including abnormal image results, differential diagnoses, treatment options, risk and benefits, lifestyle changes, current condition, and current medications was performed. Patient's comments, questions, and concerns were address and patient verbalized understanding. Based on this patient's presentation at our office, which is an orthopedic spine surgeon's office, this patient inherently / intrinsically has a risk, however minute, of developing  issues such as Cauda equina syndrome, bowel and bladder changes, or progression of motor or neurological deficits such as paralysis which may be permanent.

## 2022-03-22 NOTE — ADDENDUM
[FreeTextEntry1] : Documented by Kam Ybarra acting as a scribe for Lili Burns on 03/22/2022 . All medical record entries made by the Scribe were at my, Lili Burns , direction and personally dictated by me on 03/22/2022 . I have reviewed the chart and agree that the record accurately reflects my personal performance of the history, physical exam, assessment and plan. I have also personally directed, reviewed, and agreed with the chart.

## 2022-03-22 NOTE — PHYSICAL EXAM
[Obese] : obese [Poor Appearance] : well-appearing [Acute Distress] : not in acute distress [de-identified] : CONSTITUTIONAL: Patient is a very pleasant individual who is well-nourished and appears stated age. \par PSYCHIATRIC: Alert and oriented times three and in no apparent distress, and participates with orthopedic evaluation well.\par HEAD: Atraumatic and nonsyndromic in appearance.\par EENT: No thyromegaly, EOMI.\par RESPIRATORY: Respiratory rate is regular, not dyspneic on examination.\par LYMPHATICS: There is no cervical or axillary lymphadenopathy.\par INTEGUMENTARY: Skin is clean, dry, and intact about the bilateral upper extremities and cervical spine. Well healed anterior and posterior incision. \par VASCULAR: There is brisk capillary refill about the bilateral upper extremities and radial pulses are 2/4. \par NEUROLOGIC: Negative L'hirmitte, negative Spurling’s sign. There are no pathologic reflexes. There is no decrease in sensation of the bilateral upper extremities on Wartenberg pinwheel examination. Deep tendon reflexes are well-maintained at +2/4 of the bilateral upper extremities and are symmetric.\par MUSCULOSKELETAL: There is no visible muscular atrophy. Manual motor strength is well maintained in the bilateral upper extremities. Cervical range of motion is well maintained. The patient ambulates in a non-myelopathic manner. Normal secondary orthopaedic exam of bilateral shoulders, elbows and hands. Elbow flexion and extension, wrist extension, finger flexion and abduction are well maintained.  [de-identified] : Xray of a cervical spine taken 03/16/2021 demonstrates S/p anterior cervical corpectomy at C6 and C7, with cage and interbody from C5 - T1, posterior cervical fusion from C4 - T2. Surgical implants appear well positioned.\par \par Xray of a cervical spine taken 09/21/2021 demonstrates S/p anterior cervical corpectomy at C6 and C7, with cage and interbody from C5 - T1, posterior cervical fusion from C4 - T2. Surgical implants appear well positioned.\par \par AP and Lateral views of the cervical spine taken 03/22/2022 demonstrates  S/p anterior cervical corpectomy at C6 and C7, with cage and interbody from C5 - T1, posterior cervical fusion from C4 - T2. Surgical implants appear well positioned.

## 2022-04-11 PROBLEM — Z11.59 SCREENING FOR VIRAL DISEASE: Status: ACTIVE | Noted: 2020-11-23

## 2022-05-09 ENCOUNTER — RX RENEWAL (OUTPATIENT)
Age: 47
End: 2022-05-09

## 2022-06-01 ENCOUNTER — APPOINTMENT (OUTPATIENT)
Dept: PULMONOLOGY | Facility: CLINIC | Age: 47
End: 2022-06-01
Payer: COMMERCIAL

## 2022-06-01 VITALS
DIASTOLIC BLOOD PRESSURE: 64 MMHG | HEART RATE: 74 BPM | BODY MASS INDEX: 34.16 KG/M2 | RESPIRATION RATE: 16 BRPM | OXYGEN SATURATION: 98 % | HEIGHT: 71 IN | WEIGHT: 244 LBS | SYSTOLIC BLOOD PRESSURE: 114 MMHG

## 2022-06-01 PROCEDURE — 99214 OFFICE O/P EST MOD 30 MIN: CPT

## 2022-06-01 RX ORDER — APIXABAN 5 MG/1
5 TABLET, FILM COATED ORAL
Qty: 180 | Refills: 3 | Status: DISCONTINUED | COMMUNITY
Start: 2021-03-30 | End: 2022-06-01

## 2022-06-01 NOTE — CONSULT LETTER
[Dear  ___] : Dear  [unfilled], [Consult Letter:] : I had the pleasure of evaluating your patient, [unfilled]. [Please see my note below.] : Please see my note below. [Consult Closing:] : Thank you very much for allowing me to participate in the care of this patient.  If you have any questions, please do not hesitate to contact me. [Sincerely,] : Sincerely, [FreeTextEntry3] : Melchor Garces MD, FCCP, D. ABSM\par Pulmonary and Sleep Medicine\par [facility] at [place]

## 2022-06-01 NOTE — HISTORY OF PRESENT ILLNESS
[Former] : former [Never] : never [None] : No associated symptoms are reported [Good Compliance] : good compliance with treatment [Good Tolerance] : good tolerance of treatment [Good Symptom Control] : good symptom control [Follow-Up - Routine Clinic] : a routine clinic follow-up of [Excess Weight] : excess weight [Currently Experiencing] : The patient is currently experiencing symptoms. [Dyspnea] : dyspnea [Knee Pain] : knee pain [Back Pain] : back pain [Joint Pain] : joint pain [Low Calorie Diet] : low calorie diet [Fair Compliance] : fair compliance with treatment [Fair Tolerance] : fair tolerance of treatment [Poor Symptom Control] : poor symptom control [Dyslipidemia] : dyslipidemia [Sleep Apnea] : sleep apnea [High] : high [Low Calorie] : low calorie [Well Balanced Diet] : well balanced meals [Infrequently] : exercises infrequently [Walking] : walking [TextBox_4] : Patient c/o SOBOE but is otherwise without associated respiratory complaints. \par He has a h/o PE for which he is still maintained on Eliquis therapy.\par Pt reports PE about 2 weeks after the 2nd Covid vaccine dose and after neck surgery. Pt s/p medical thrombectomy with improvement in echo findings but with reported pulm HTN though last echo from 7/15/21 was essentially normal without pulm HTN.\par He is s/p PSG and is using CPAP therapy. [TextBox_11] : .5 [TextBox_13] : 2 [YearQuit] : 2015 [de-identified] : APAP

## 2022-06-01 NOTE — DISCUSSION/SUMMARY
[FreeTextEntry1] : \par #1. Continue APAP for severe JIA with an AHI of 40.1\par #2. Diet and exercise for weight loss\par #3. Pt completed Eliquis for h/o PE per cardiology; could consider heme evaluation if desired\par #4. Last echo without pulm HTN\par #5. Further pulm w/u and management per Dr. Wadsworth as needed\par #6. Replace equipment as needed; ordered 6/1/22\par #7. Pt had both Covid vaccines; rec flu annual vaccine\par #8. Home PSG to re-evaluate possible resolution of JIA with weight loss\par #9. Could consider baseline PFTs but currently pt without respiratory symptoms\par #10. Reviewed risks of exposure and symptoms of Covid-19 virus, including how the virus is spread and precautions to avoid lev virus.\par \par The patient expressed understanding and agreement with the above recommendations/plan and accepts responsibility to be compliant with recommended testing, therapies, and f/u visits.\par All relevant questions and concerns were addressed.

## 2022-06-01 NOTE — PHYSICAL EXAM
[No Acute Distress] : no acute distress [Well Nourished] : well nourished [Well Developed] : well developed [Low Lying Soft Palate] : low lying soft palate [Enlarged Base of the Tongue] : enlarged base of the tongue [IV] : Mallampati Class: IV [Normal Appearance] : normal appearance [Supple] : supple [Normal Rate/Rhythm] : normal rate/rhythm [Normal S1, S2] : normal s1, s2 [No Murmurs] : no murmurs [No Resp Distress] : no resp distress [No Acc Muscle Use] : no acc muscle use [Normal Rhythm and Effort] : normal rhythm and effort [Clear to Auscultation Bilaterally] : clear to auscultation bilaterally [No Abnormalities] : no abnormalities [Benign] : benign [Not Tender] : not tender [Soft] : soft [No Clubbing] : no clubbing [No Edema] : no edema [No Focal Deficits] : no focal deficits [Oriented x3] : oriented x3 [TextBox_2] : Pt is obese [TextBox_11] : Severe oropharyngeal crowding.

## 2022-06-16 ENCOUNTER — APPOINTMENT (OUTPATIENT)
Age: 47
End: 2022-06-16
Payer: COMMERCIAL

## 2022-06-16 ENCOUNTER — OUTPATIENT (OUTPATIENT)
Dept: OUTPATIENT SERVICES | Facility: HOSPITAL | Age: 47
LOS: 1 days | End: 2022-06-16
Payer: COMMERCIAL

## 2022-06-16 DIAGNOSIS — Z87.81 PERSONAL HISTORY OF (HEALED) TRAUMATIC FRACTURE: Chronic | ICD-10-CM

## 2022-06-16 DIAGNOSIS — G47.33 OBSTRUCTIVE SLEEP APNEA (ADULT) (PEDIATRIC): ICD-10-CM

## 2022-06-16 DIAGNOSIS — Z98.1 ARTHRODESIS STATUS: Chronic | ICD-10-CM

## 2022-06-16 PROCEDURE — 95800 SLP STDY UNATTENDED: CPT | Mod: TC

## 2022-06-16 PROCEDURE — 95800 SLP STDY UNATTENDED: CPT

## 2022-07-07 ENCOUNTER — APPOINTMENT (OUTPATIENT)
Dept: CARDIOLOGY | Facility: CLINIC | Age: 47
End: 2022-07-07

## 2022-07-07 ENCOUNTER — NON-APPOINTMENT (OUTPATIENT)
Age: 47
End: 2022-07-07

## 2022-07-07 VITALS
HEART RATE: 68 BPM | BODY MASS INDEX: 34.16 KG/M2 | DIASTOLIC BLOOD PRESSURE: 74 MMHG | HEIGHT: 71 IN | SYSTOLIC BLOOD PRESSURE: 110 MMHG | WEIGHT: 244 LBS

## 2022-07-07 PROCEDURE — 99213 OFFICE O/P EST LOW 20 MIN: CPT | Mod: 25

## 2022-07-07 PROCEDURE — 93000 ELECTROCARDIOGRAM COMPLETE: CPT

## 2022-07-07 NOTE — HISTORY OF PRESENT ILLNESS
[FreeTextEntry1] : Pt is a 47 PMH HLD, BMI 37, JIA.  Pt had cervical spine surgery 03/08/2021.  A few weeks later he was having a hard time breathing and passed out, was taken to [facility] and found to have PE s/p mechanical thrombectomy.  TTE showed RVE with decreased function, sev pHTN.   \par TTE 07/2021 showed normal LV and RV function, RVSP 13mmHg \par TTE 07/2022 EF 60%, normal RV function, RVSP 26\par He states that since then he has been feeling much better - denies CP, SOB, diaphoresis, palpitations, dizziness, syncope, LE edema, PND, orthopnea.  He has stopped Eliquis 01/2022\par He is is exercising and dieting - lost 50lbs!\par COVID vaccine 03/2021 Pfizer\par COVID+ 11/2021, not hospitalized\par \par PMH: HLD, PE after cervical surgery 03/2021, BMI 37\par Smoking status: never\par social ETOH\par no drug use\par Current exercise: none\par Daily water intake:\par Daily caffeine intake:\par OTC medications: \par Family hx: parents HTN/HLD/DM\par \par

## 2022-07-07 NOTE — DISCUSSION/SUMMARY
[FreeTextEntry1] : Pt is a 47 PMH HLD, BMI 37.  Pt had cervical spine surgery 03/08/2021.  A few weeks later he was having a hard time breathing and passed out, was taken to [facility] and found to have PE s/p mechanical thrombectomy.  TTE showed RVE with decreased function, sev pHTN. \par \par PE, R heart failure, pHTN:\par TTE 07/2021 normal LV and RV function, RVSP 13mmHg\par TTE 07/2022 EF 60%, normal RV function, RVSP 26\par pt is doing very well - exercising regularly\par \par HLD:\par c/w statin\par repeat labs\par \par JIA:\par following with pulm, on CPAP\par \par Pt will return in 12 mnths or sooner as needed but I encouraged communication via phone or portal if necessary. \par The described plan was discussed with the pt.  All questions and concerns were addressed to the best of my knowledge.

## 2022-07-19 ENCOUNTER — APPOINTMENT (OUTPATIENT)
Dept: FAMILY MEDICINE | Facility: CLINIC | Age: 47
End: 2022-07-19

## 2022-07-19 VITALS
DIASTOLIC BLOOD PRESSURE: 74 MMHG | WEIGHT: 250 LBS | BODY MASS INDEX: 35 KG/M2 | OXYGEN SATURATION: 99 % | HEIGHT: 71 IN | TEMPERATURE: 97.7 F | SYSTOLIC BLOOD PRESSURE: 110 MMHG | HEART RATE: 72 BPM

## 2022-07-19 DIAGNOSIS — M48.02 SPINAL STENOSIS, CERVICAL REGION: ICD-10-CM

## 2022-07-19 DIAGNOSIS — M54.9 DORSALGIA, UNSPECIFIED: ICD-10-CM

## 2022-07-19 DIAGNOSIS — Z87.898 PERSONAL HISTORY OF OTHER SPECIFIED CONDITIONS: ICD-10-CM

## 2022-07-19 DIAGNOSIS — N30.01 ACUTE CYSTITIS WITH HEMATURIA: ICD-10-CM

## 2022-07-19 DIAGNOSIS — R31.29 OTHER MICROSCOPIC HEMATURIA: ICD-10-CM

## 2022-07-19 PROCEDURE — G0444 DEPRESSION SCREEN ANNUAL: CPT | Mod: 59

## 2022-07-19 PROCEDURE — 99396 PREV VISIT EST AGE 40-64: CPT | Mod: 25

## 2022-07-19 RX ORDER — ACETAMINOPHEN 500 MG/1
500 TABLET ORAL
Qty: 180 | Refills: 0 | Status: DISCONTINUED | COMMUNITY
Start: 2021-11-15 | End: 2022-07-19

## 2022-07-19 NOTE — ASSESSMENT
[FreeTextEntry1] : Annual physical: f/u routine labwork\par Hx of PE: provoked, occurred after cervical spine surgery 3/2021, 2 wks later diagnosed with PE s/p mechanical thrombectomy, TTE showed pHTN, RVE, repeat TTE 7/22 shows normal pulmonary pressures, f/u pulmnology, cardiology\par RHF: seen on TTE after PE, f/u cardiology\par HLD: Recommend low fat diet, wt loss, exercise, nutritional counseling provided, f/u lipid panel\par JIA: c/w cpap, f/u pulm, Discussed improved sleep hygiene, avoid sleeping in supine position, recommend wt loss/exercise, avoid driving while sleepy, avoid alcohol use prior to sleeping, f/u pulm\par Cervical myelopathy/stenosis: improved after cervical spine sx 3/2021, s/p PT, recommend low-mod intensity exercise/stretching, f/u orthopedist\par Lumbar disc disease: recommend low-mod intensity exercise/stretching, f/u orthopedist\par BMI 34: Recommend low fat diet, wt loss, exercise, nutritional counseling provided\par Vitamin D def: f/u level\par Screen for colon CA: pt declined colonoscpy, discussed importance of colon surveillance, pt will consider in the future, discussed risks of colon CA\par RTC 2 wks

## 2022-07-19 NOTE — HEALTH RISK ASSESSMENT
[Patient declined colonoscopy] : Patient declined colonoscopy [Very Good] : ~his/her~  mood as very good [Never] : Never [Yes] : Yes [Monthly or less (1 pt)] : Monthly or less (1 point) [1 or 2 (0 pts)] : 1 or 2 (0 points) [Less than monthly (1 pt)] : Less than monthly (1 point) [No] : In the past 12 months have you used drugs other than those required for medical reasons? No [No falls in past year] : Patient reported no falls in the past year [0] : 2) Feeling down, depressed, or hopeless: Not at all (0) [PHQ-2 Negative - No further assessment needed] : PHQ-2 Negative - No further assessment needed [HIV test declined] : HIV test declined [Hepatitis C test declined] : Hepatitis C test declined [With Family] : lives with family [Employed] : employed [] :  [Sexually Active] : sexually active [Feels Safe at Home] : Feels safe at home [Fully functional (bathing, dressing, toileting, transferring, walking, feeding)] : Fully functional (bathing, dressing, toileting, transferring, walking, feeding) [Fully functional (using the telephone, shopping, preparing meals, housekeeping, doing laundry, using] : Fully functional and needs no help or supervision to perform IADLs (using the telephone, shopping, preparing meals, housekeeping, doing laundry, using transportation, managing medications and managing finances) [Audit-CScore] : 2 [de-identified] : needs improvement [de-identified] : needs improvement [[id number]] : 0 [High Risk Behavior] : no high risk behavior [Reports changes in hearing] : Reports no changes in hearing [Reports changes in vision] : Reports no changes in vision [Reports changes in dental health] : Reports no changes in dental health

## 2022-07-19 NOTE — HISTORY OF PRESENT ILLNESS
[FreeTextEntry1] : CPE [de-identified] : 46 yo male presents for annual physical. No acute complaints. Reports feeling well. Denies fever, chills, cp, palpitations, sob, nv, heat/cold intolerance, dizziness, melena, hematochezia, muscle weakness, loss of sensation, bowel/bladder incontinence or calf pain.\par

## 2022-07-20 ENCOUNTER — NON-APPOINTMENT (OUTPATIENT)
Age: 47
End: 2022-07-20

## 2022-07-20 LAB
25(OH)D3 SERPL-MCNC: 28 NG/ML
ALBUMIN SERPL ELPH-MCNC: 4.6 G/DL
ALP BLD-CCNC: 91 U/L
ALT SERPL-CCNC: 21 U/L
ANION GAP SERPL CALC-SCNC: 10 MMOL/L
AST SERPL-CCNC: 20 U/L
BASOPHILS # BLD AUTO: 0.03 K/UL
BASOPHILS NFR BLD AUTO: 0.4 %
BILIRUB SERPL-MCNC: 0.4 MG/DL
BUN SERPL-MCNC: 16 MG/DL
CALCIUM SERPL-MCNC: 9.6 MG/DL
CHLORIDE SERPL-SCNC: 103 MMOL/L
CHOLEST SERPL-MCNC: 176 MG/DL
CO2 SERPL-SCNC: 25 MMOL/L
CREAT SERPL-MCNC: 0.97 MG/DL
EGFR: 97 ML/MIN/1.73M2
EOSINOPHIL # BLD AUTO: 0.16 K/UL
EOSINOPHIL NFR BLD AUTO: 2.3 %
ESTIMATED AVERAGE GLUCOSE: 108 MG/DL
FOLATE SERPL-MCNC: 16 NG/ML
GLUCOSE SERPL-MCNC: 94 MG/DL
HBA1C MFR BLD HPLC: 5.4 %
HCT VFR BLD CALC: 47.9 %
HDLC SERPL-MCNC: 62 MG/DL
HGB BLD-MCNC: 16.3 G/DL
IMM GRANULOCYTES NFR BLD AUTO: 0.3 %
LDLC SERPL CALC-MCNC: 93 MG/DL
LYMPHOCYTES # BLD AUTO: 2.86 K/UL
LYMPHOCYTES NFR BLD AUTO: 40.7 %
MAGNESIUM SERPL-MCNC: 2 MG/DL
MAN DIFF?: NORMAL
MCHC RBC-ENTMCNC: 29.5 PG
MCHC RBC-ENTMCNC: 34 GM/DL
MCV RBC AUTO: 86.8 FL
MONOCYTES # BLD AUTO: 0.52 K/UL
MONOCYTES NFR BLD AUTO: 7.4 %
NEUTROPHILS # BLD AUTO: 3.43 K/UL
NEUTROPHILS NFR BLD AUTO: 48.9 %
NONHDLC SERPL-MCNC: 114 MG/DL
PLATELET # BLD AUTO: 217 K/UL
POTASSIUM SERPL-SCNC: 4.7 MMOL/L
PROT SERPL-MCNC: 7.1 G/DL
RBC # BLD: 5.52 M/UL
RBC # FLD: 13.2 %
SODIUM SERPL-SCNC: 138 MMOL/L
TRIGL SERPL-MCNC: 106 MG/DL
TSH SERPL-ACNC: 2.86 UIU/ML
VIT B12 SERPL-MCNC: 766 PG/ML
WBC # FLD AUTO: 7.02 K/UL

## 2022-08-16 ENCOUNTER — APPOINTMENT (OUTPATIENT)
Dept: PULMONOLOGY | Facility: CLINIC | Age: 47
End: 2022-08-16

## 2022-08-16 VITALS
DIASTOLIC BLOOD PRESSURE: 74 MMHG | SYSTOLIC BLOOD PRESSURE: 134 MMHG | RESPIRATION RATE: 16 BRPM | HEIGHT: 71 IN | HEART RATE: 68 BPM | OXYGEN SATURATION: 99 % | WEIGHT: 250 LBS | BODY MASS INDEX: 35 KG/M2

## 2022-08-16 DIAGNOSIS — Z87.891 PERSONAL HISTORY OF NICOTINE DEPENDENCE: ICD-10-CM

## 2022-08-16 DIAGNOSIS — Z87.898 PERSONAL HISTORY OF OTHER SPECIFIED CONDITIONS: ICD-10-CM

## 2022-08-16 PROCEDURE — 99214 OFFICE O/P EST MOD 30 MIN: CPT

## 2022-08-16 NOTE — HISTORY OF PRESENT ILLNESS
[Former] : former [Never] : never [None] : No associated symptoms are reported [Good Compliance] : good compliance with treatment [Good Tolerance] : good tolerance of treatment [Good Symptom Control] : good symptom control [Follow-Up - Routine Clinic] : a routine clinic follow-up of [Excess Weight] : excess weight [Currently Experiencing] : The patient is currently experiencing symptoms. [Dyspnea] : dyspnea [Knee Pain] : knee pain [Back Pain] : back pain [Joint Pain] : joint pain [Low Calorie Diet] : low calorie diet [Fair Compliance] : fair compliance with treatment [Fair Tolerance] : fair tolerance of treatment [Poor Symptom Control] : poor symptom control [Dyslipidemia] : dyslipidemia [Sleep Apnea] : sleep apnea [High] : high [Low Calorie] : low calorie [Well Balanced Diet] : well balanced meals [Infrequently] : exercises infrequently [Walking] : walking [TextBox_4] : Patient c/o SOBOE but is otherwise without associated respiratory complaints. \par He has a h/o PE for which he is s/p Eliquis therapy.\par Pt reports PE about 2 weeks after the 2nd Covid vaccine dose and after neck surgery. Pt s/p medical thrombectomy with improvement in echo findings but with reported pulm HTN though last echo from 7/15/21 was essentially normal without pulm HTN.\par Last HST was negative for JIA with an AHI of 1.6 [TextBox_11] : .5 [TextBox_13] : 2 [YearQuit] : 2015 [de-identified] : APAP

## 2022-08-16 NOTE — DISCUSSION/SUMMARY
[FreeTextEntry1] : \par #1. Pt no longer requires PAP therapy given resolution of JIA with now an AHI of 1.6 on HST with weight loss \par #2. Diet and exercise for weight loss\par #3. Pt completed Eliquis for h/o PE per cardiology; could consider heme evaluation if desired but may have been related to prior Covid vaccination\par #4. Last echo without pulm HTN\par #5. Further pulm w/u and management per Dr. Wadsworth as needed\par #6. Replace equipment as needed; ordered 6/1/22 but no longer requires\par #7. Pt had both Covid vaccines; rec flu annual vaccine\par #8. Could consider baseline PFTs but currently pt without respiratory symptoms so pt does not want\par #9. Reviewed risks of exposure and symptoms of Covid-19 virus, including how the virus is spread and precautions to avoid lev virus.\par #10. F/u as needed but currently without an active sleep or pulmonary issues\par \par The patient expressed understanding and agreement with the above recommendations/plan and accepts responsibility to be compliant with recommended testing, therapies, and f/u visits.\par All relevant questions and concerns were addressed.

## 2022-09-02 ENCOUNTER — APPOINTMENT (OUTPATIENT)
Dept: ORTHOPEDIC SURGERY | Facility: CLINIC | Age: 47
End: 2022-09-02

## 2022-09-02 VITALS
BODY MASS INDEX: 35 KG/M2 | WEIGHT: 250 LBS | SYSTOLIC BLOOD PRESSURE: 149 MMHG | HEART RATE: 63 BPM | HEIGHT: 71 IN | DIASTOLIC BLOOD PRESSURE: 93 MMHG

## 2022-09-02 PROCEDURE — 99213 OFFICE O/P EST LOW 20 MIN: CPT | Mod: 25

## 2022-09-02 PROCEDURE — 72040 X-RAY EXAM NECK SPINE 2-3 VW: CPT

## 2022-09-02 PROCEDURE — 20552 NJX 1/MLT TRIGGER POINT 1/2: CPT

## 2022-09-02 PROCEDURE — 72100 X-RAY EXAM L-S SPINE 2/3 VWS: CPT

## 2022-09-02 NOTE — PHYSICAL EXAM
[de-identified] : CONSTITUTIONAL: The patient is a very pleasant individual who is well-nourished and who appears stated age.\par PSYCHIATRIC: The patient is alert and oriented X 3 and in no apparent distress, and participates with orthopedic evaluation well.\par HEAD: Atraumatic and is nonsyndromic in appearance.\par EENT: No visible thyromegaly, EOMI.\par RESPIRATORY: Respiratory rate is regular, not dyspneic on examination.\par LYMPHATICS: There is no inguinal lymphadenopathy\par INTEGUMENTARY: Skin is clean, dry, and intact about the bilateral lower extremities and lumbar spine.  Previous anterior posterior cervical incisions are well-healed\par VASCULAR: There is brisk capillary refill about the bilateral lower extremities.\par NEUROLOGIC: There are no pathologic reflexes. There is no decrease in sensation of the bilateral lower extremities on Wartenberg pinwheel examination. Deep tendon reflexes are well maintained at 2+/4 of the bilateral lower extremities and are symmetric.  Lower extremity hyperreflexia is present from his previous myelopathic diagnosis\par MUSCULOSKELETAL: There is no visible muscular atrophy. Manual motor strength is well maintained in the bilateral lower extremities. Range of motion of lumbar spine is well maintained. The patient ambulates in a non-myelopathic manner. Negative tension sign and straight leg raise bilaterally. Quad extension, ankle dorsiflexion, EHL, plantar flexion, and ankle eversion are well preserved. Normal secondary orthopaedic exam of bilateral hips, greater trochanteric area, knees and ankles.  Patient has a very prominent right sided lumbar myositis and trigger point findings [de-identified] : Lumbar x-rays taken on today's date of September 2, 2022 demonstrates mild L5-S1 lumbar degenerative disc disease.\par \par Cervical x-rays demonstrate corpectomy cage as well as posterior instrumented fusion well-positioned

## 2022-09-02 NOTE — PROCEDURE
[de-identified] : Verbal consent was obtained and all questions, comments and concerns were addressed.  After right lower lumbar trigger point in the affected area into superficial muscular trigger point was cleansed with alcohol prep X 3, ethyl chloride was used as local anesthetic.  Under sterile precautions 1cc of 1 percent lidocaine without epinephrine, plus 10 mg depomedrol, were instilled into the affected trigger points.  Patient tolerated procedure well. Dry sterile dressing was placed and patient described relief of pain 5 minutes after procedure was performed.

## 2022-09-02 NOTE — HISTORY OF PRESENT ILLNESS
[de-identified] : Patient presents with a primary complaint of bilateral lumbar myositis.  He states he has no complaints with regard to his cervical spine he has much improved JACKLYN questionnaire.  He has tried approximately 5 or 6 visits of physical therapy states it is ineffective in controlling his low back pain.  Anti-inflammatories also provided short-lived relief.  He is doing home exercises that appear to be stable [Worsening] : worsening [10] : a maximum pain level of 10/10 [Bending] : worsened by bending [Lifting] : worsened by lifting [NSAIDs] : relieved by nonsteroidal anti-inflammatory drugs [Rest] : relieved by rest [Ataxia] : no ataxia [Incontinence] : no incontinence [Loss of Dexterity] : good dexterity [Urinary Ret.] : no urinary retention

## 2022-09-02 NOTE — DISCUSSION/SUMMARY
[de-identified] : Patient tolerated trigger point injection very well he was provided oral Medrol Dosepak and Toradol.  Patient is going to follow-up in 4 to 6 weeks if his lumbar pain is still persistent with relation to L5-S1 degenerative disc disease and myositis diagnosis and MRI can be considered and/or repeat trigger point injection depending on his response to today's injection

## 2022-10-18 ENCOUNTER — APPOINTMENT (OUTPATIENT)
Dept: ORTHOPEDIC SURGERY | Facility: CLINIC | Age: 47
End: 2022-10-18

## 2022-10-18 VITALS
HEART RATE: 67 BPM | DIASTOLIC BLOOD PRESSURE: 90 MMHG | HEIGHT: 71 IN | BODY MASS INDEX: 35 KG/M2 | WEIGHT: 250 LBS | SYSTOLIC BLOOD PRESSURE: 144 MMHG

## 2022-10-18 PROCEDURE — 99213 OFFICE O/P EST LOW 20 MIN: CPT

## 2022-10-18 NOTE — DISCUSSION/SUMMARY
[de-identified] : Conservative treatment was discussed with the patient at length. Anticipatory guidance regarding disease process bar degenerative disc disease, status post cervical fusion, avoidance of acute exacerbation this was discussed at length and all patients commenting concerns were answered to the patient's satisfaction. Physical therapy for decrease pain and increase function was discussed the patient is opting for home exercises which she has been doing on a daily basis and has greatly relieved his pain. Patient was given home exercises as approved by [employer] and works well held directed toward this particular process. Intermittent use of acetaminophen 500 mg 2 tablets t.i.d. p.r.n. mild to moderate pain, ibuprofen 600 mg t.i.d. p.r.n. severe pain with food or milk if there is no medical contraindication. Home exercise including stretching on a daily basis for 20-30 minutes was recommended. Heat, ice, topical were discussed as needed. The patient will followup as needed continued back pain at which point in time if symptoms continue we will order MRI studies to guide treatment plan including possible injection therapy with pain management versus surgical option.  If pain returns we can also offer trigger point injections approximately 4 times a year as needed.\par Guarding status post cervical fusion he can return to office in March 2023 at which point he will be 2 years postsurgery.

## 2022-10-18 NOTE — PHYSICAL EXAM
[de-identified] : CONSTITUTIONAL: The patient is a very pleasant individual who is well-nourished and who appears stated age.\par PSYCHIATRIC: The patient is alert and oriented X 3 and in no apparent distress, and participates with orthopedic evaluation well.\par HEAD: Atraumatic and is nonsyndromic in appearance.\par EENT: No visible thyromegaly, EOMI.\par RESPIRATORY: Respiratory rate is regular, not dyspneic on examination.\par LYMPHATICS: There is no inguinal lymphadenopathy\par INTEGUMENTARY: Skin is clean, dry, and intact about the bilateral lower extremities and lumbar spine.\par VASCULAR: There is brisk capillary refill about the bilateral lower extremities.\par NEUROLOGIC: There are no pathologic reflexes. There is no decrease in sensation of the bilateral lower extremities on Wartenberg pinwheel examination. Deep tendon reflexes are well maintained at 2+/4 of the bilateral lower extremities and are symmetric..\par MUSCULOSKELETAL: There is no visible muscular atrophy. Manual motor strength is well maintained in the bilateral lower extremities. Range of motion of lumbar spine is well maintained. The patient ambulates in a non-myelopathic manner. Negative tension sign and straight leg raise bilaterally. Quad extension, ankle dorsiflexion, EHL, plantar flexion, and ankle eversion are well preserved. Normal secondary orthopaedic exam of bilateral hips, greater trochanteric area, knees and ankles [de-identified] : no New imaging\par  Lumbar x-rays PA lateral  taken on September 2, 2022 demonstrates mild L5-S1 lumbar degenerative disc disease.\par \par prior Cervical x-rays AP lateral demonstrate corpectomy cage as well as posterior instrumented fusion well-positioned.  Cephalad posterior instrumentation at C4 lateral masses appear to be fractured this is goes back to the September 2022 x-ray\par

## 2022-10-18 NOTE — HISTORY OF PRESENT ILLNESS
[de-identified] : 47-year-old male is here for follow-up regarding low back pain.  He received a trigger point injection on his last visit and states he has had no pain ever since then.  The pain had been going on for approximately 4 months prior to his trigger point injection that was given last visit.  Does the gym regularly, does proper lifting and bending on a regular basis.  He has not stretching however.  He does not have any pain of the back or the lower extremities at this time.  Also has history of cervical reconstruction, states he has no neck pain at this time nor does he have any weakness of the arms or legs.

## 2022-12-12 NOTE — PROGRESS NOTE ADULT - SUBJECTIVE AND OBJECTIVE BOX
Maverick mccain is a very pleasant gentleman who is approximately 3 weeks status post an anterior posterior cervical reconstruction. Patient is discharged home on enteric-coated aspirin 325 once a day for DVT prophylaxis. He presents with chest pain shortness of breath and ultimate diagnosis of a pulmonary embolism. This was discussed with the physician assistant assisted/nurse practitioner yesterday evening. I think complete treatment for pulmonary embolism takes precedence at this point in time although his anticoagulation etc. is reasonable at this point in time.    Past medical history is positive for hyperlipidemia  Drug allergies penicillin  Medications atorvastatin  Past orthopedic surgical history acute 3 week history of anterior posterior cervical reconstruction in a C6-C7 burst fracture approximately 10 years ago think complete spinal cord injury.  Review of systems  Negative at this point in time however the pulmonary aspect is positive for shortness of breath and ultimate diagnosis of pulmonary embolism.    Physical exam  CONSTITUTIONAL:  Patient is a very pleasant individual who is well-nourished and appears stated age.   PSYCHIATRIC:  Alert and oriented times three and in no apparent distress, and participates with orthopedic evaluation well.  HEAD:  Atraumatic and  nonsyndromic in appearance.  EENT: No thyromegaly, EOMI.  RESPIRATORY:  Respiratory rate is regular, not dyspneic on examination.  LYMPHATICS:  There is no cervical or axillary lymphadenopathy.  INTEGUMENTARY:  Skin is clean, dry, and intact about the bilateral upper extremities and cervical spine.   VASCULAR:   There is brisk capillary refill about the bilateral upper extremities and radial pulses are 2/4.   NEUROLOGIC:  Negative L'hirmitte, negative Spurling's sign. There are no pathologic reflexes. There is no decrease in sensation of the bilateral upper extremities on Wartenberg pinwheel/manual examination.  Deep tendon reflexes are well-maintained at +2/4 of the bilateral upper extremities and are symmetric.  MUSCULOSKELETAL:  There is no visible muscular atrophy.  Manual motor strength is well maintained in the bilateral upper extremities.  Cervical range of motion is well maintained.  The patient ambulates in a non-myelopathic manner. Normal secondary orthopaedic exam of bilateral shoulders, elbows and hands.  Elbow flexion and extension, wrist extension, finger flexion and abduction are well maintained.       From orthopedic surgical perspective upper and lower extremity motor strength is well maintained. Patient is in no acute distress at this point in time.    Impression and plan  Status post anterior posterior cervical reconstruction. Continue with cardiothoracic surgery potential thrombectomy flex the coagulation treatment is required for pulmonary wasn't as allowable this point in time.   Maverick mccain is a very pleasant gentleman who is approximately 3 weeks status post an anterior posterior cervical reconstruction. Patient is discharged home on enteric-coated aspirin 325 once a day for DVT prophylaxis. He presents with tachycardia and ultimate diagnosis of a pulmonary embolism. This was discussed with the physician assistant assisted/nurse practitioner yesterday evening. I think complete treatment for pulmonary embolism takes precedence at this point in time although his anticoagulation etc. is reasonable at this point in time.  Patient evaluated and on/in the ER at bed B  10  Past medical history is positive for hyperlipidemia  Drug allergies penicillin  Medications atorvastatin  Past orthopedic surgical history acute 3 week history of anterior posterior cervical reconstruction in a C6-C7 burst fracture approximately 10 years ago think complete spinal cord injury.  Review of systems  Negative at this point in time however the pulmonary aspect is positive for shortness of breath and ultimate diagnosis of pulmonary embolism.    Physical exam  CONSTITUTIONAL:  Patient is a very pleasant individual who is well-nourished and appears stated age.   PSYCHIATRIC:  Alert and oriented times three and in no apparent distress, and participates with orthopedic evaluation well.  HEAD:  Atraumatic and  nonsyndromic in appearance.  EENT: No thyromegaly, EOMI.  RESPIRATORY:  Respiratory rate is regular, not dyspneic on examination.  LYMPHATICS:  There is no cervical or axillary lymphadenopathy. C edema and patient states even prior to the hospital presentation it is no lower extremity edema  INTEGUMENTARY:  Skin is clean, dry, and intact about the bilateral upper extremities and cervical spine.   VASCULAR:   There is brisk capillary refill about the bilateral upper extremities and radial pulses are 2/4.   NEUROLOGIC:  Negative L'hirmitte, negative Spurling's sign. There are no pathologic reflexes. There is no decrease in sensation of the bilateral upper extremities on Wartenberg pinwheel/manual examination.  Deep tendon reflexes are well-maintained at +2/4 of the bilateral upper extremities and are symmetric.  MUSCULOSKELETAL:  There is no visible muscular atrophy.  Manual motor strength is well maintained in the bilateral upper extremities.  Cervical range of motion is well maintained.  The patient ambulates in a non-myelopathic manner. Normal secondary orthopaedic exam of bilateral shoulders, elbows and hands.  Elbow flexion and extension, wrist extension, finger flexion and abduction are well maintained.       From orthopedic surgical perspective upper and lower extremity motor strength is well maintained. Patient is in no acute distress at this point in time.    Impression and plan  Status post anterior posterior cervical reconstruction. Continue with cardiothoracic surgery potential thrombectomy flex the coagulation treatment is required for pulmonary wasn't as allowable this point in time.   Peng Advancement Flap Text: The defect edges were debeveled with a #15 scalpel blade.  Given the location of the defect, shape of the defect and the proximity to free margins a Peng advancement flap was deemed most appropriate.  Using a sterile surgical marker, an appropriate advancement flap was drawn incorporating the defect and placing the expected incisions within the relaxed skin tension lines where possible. The area thus outlined was incised deep to adipose tissue with a #15 scalpel blade.  The skin margins were undermined to an appropriate distance in all directions utilizing iris scissors.

## 2023-01-19 ENCOUNTER — APPOINTMENT (OUTPATIENT)
Dept: ORTHOPEDIC SURGERY | Facility: CLINIC | Age: 48
End: 2023-01-19
Payer: COMMERCIAL

## 2023-01-19 VITALS
DIASTOLIC BLOOD PRESSURE: 78 MMHG | TEMPERATURE: 98.1 F | HEART RATE: 74 BPM | BODY MASS INDEX: 35.84 KG/M2 | SYSTOLIC BLOOD PRESSURE: 117 MMHG | WEIGHT: 256 LBS | HEIGHT: 71 IN

## 2023-01-19 PROCEDURE — 20552 NJX 1/MLT TRIGGER POINT 1/2: CPT

## 2023-01-19 PROCEDURE — 99213 OFFICE O/P EST LOW 20 MIN: CPT | Mod: 25

## 2023-01-19 PROCEDURE — 72040 X-RAY EXAM NECK SPINE 2-3 VW: CPT | Mod: 26

## 2023-01-19 NOTE — PROCEDURE
[de-identified] : Verbal consent was obtained and all questions, comments and concerns were addressed.  After trigger point in the affected area left lumbar paraspinals into superficial muscular trigger point was cleansed with alcohol prep X 3, ethyl chloride was used as local anesthetic.  Under sterile precautions 1cc of 1 percent lidocaine without epinephrine, plus 10 mg depomedrol, were instilled into the affected trigger points.  Patient tolerated procedure well. Dry sterile dressing was placed and patient described relief of pain 5 minutes after procedure was performed.

## 2023-01-19 NOTE — HISTORY OF PRESENT ILLNESS
[de-identified] : 48-year-old male is following up for low back pain.  He states he had a trigger point injection approximately 4 months ago helped for 3 and half months but his pain has been back.  He describes it as sore and a dull ache.  Is worse if he is bending he has a hard time tying his shoes because of the pain.  He also describes it as stiffness.  The pain is not radiating down his legs he does not have any weakness of the legs.  He stretches and exercises 5 days a week.  Is not taking any medication on a regular basis for this pain.  No change in bowel or bladder.\par Regarding his neck he is 2 years status post reconstructive surgery for cervical stenosis with myelopathy.  Neck pain is well controlled only intermittent described as sore.  Denies upper extremity pain tingling numbness.  He does state that after a brief viral illness which caused him fever for 3 days back 1 month ago the back and neck pain was exacerbated and he did have some numbness of the tips of 2 fingers on the left side however this entire symptom is resolved except for the low back pain.

## 2023-01-19 NOTE — PHYSICAL EXAM
[de-identified] : CONSTITUTIONAL: The patient is a very pleasant individual who is well-nourished and who appears stated age.\par PSYCHIATRIC: The patient is alert and oriented X 3 and in no apparent distress, and participates with orthopedic evaluation well.\par HEAD: Atraumatic and is nonsyndromic in appearance.\par EENT: No visible thyromegaly, EOMI.\par RESPIRATORY: Respiratory rate is regular, not dyspneic on examination.\par LYMPHATICS: There is no inguinal lymphadenopathy\par INTEGUMENTARY: Skin is clean, dry, and intact about the bilateral lower extremities and lumbar spine.\par VASCULAR: There is brisk capillary refill about the bilateral lower extremities.\par NEUROLOGIC: There are no pathologic reflexes. There is no decrease in sensation of the bilateral lower extremities on Wartenberg pinwheel examination. Deep tendon reflexes are well maintained at 2+/4 of the bilateral lower extremities and are symmetric..\par MUSCULOSKELETAL: There is no visible muscular atrophy. Manual motor strength is well maintained in the bilateral lower extremities. Range of motion of lumbar spine is well maintained. The patient ambulates in a non-myelopathic manner. Negative tension sign and straight leg raise bilaterally. Quad extension, ankle dorsiflexion, EHL, plantar flexion, and ankle eversion are well preserved. Normal secondary orthopaedic exam of bilateral hips, greater trochanteric area, knees and ankles lumbar trigger point left lower lumbar paraspinals was found.  Areas were prepped with ChloraPrep and trigger point injection was performed.  Patient did well.\par \par  [de-identified] :  Lumbar x-rays PA lateral taken on September 2, 2022 demonstrates mild L5-S1 lumbar degenerative disc disease\par \par Done today on January 19, 2023 cervical x-rays AP lateral demonstrate corpectomy cage as well as posterior instrumented fusion well-positioned. Cephalad posterior instrumentation at C4 lateral masses appear to be fractured this is goes back to the September 2022 x-ray

## 2023-01-19 NOTE — DISCUSSION/SUMMARY
[de-identified] : He did well after trigger point injection lumbar spine.  He can return for trigger point injection every 4 to 6 months as needed.  If back pain continues or becomes persistent or changes in nature we will obtain a lumbar MRI which will guide treatment plan.  Continue home exercise stretching.  Heat, ice, topicals as needed.  Tylenol 1000 mg p.o. 3 times daily as needed mild to moderate pain, ibuprofen 600 mg every 8 hours as needed severe pain.\par Regarding status post cervical decompression and reconstruction, he is not having any significant neck pain or upper extremity pain paresthesia or weakness.  Follow-up in 3 to 4 months or as needed.

## 2023-06-04 NOTE — HISTORY OF PRESENT ILLNESS
Patient: Herman Diaz Date: 2023   : 1952 Attending: Anton Kim MD   71 year old male Room: /A     Chief Complaint: AV/MV disease  Post-op Day #: 4  Surgical Procedure: redo-sternotomy, redo-aortic valve replacement (25mm Inspiris Resilia tissue valve), redo-mitral valve replacement (29mm Epic tissue valve) and removal of large vegetation/thrombus in left atrium    Subjective: tired. Denies pain, SOB, nausea.     Vital Last Value 24 Hour Range   Temperature 96.7 °F (35.9 °C) (23 0400) Temp  Min: 96.7 °F (35.9 °C)  Max: 98.3 °F (36.8 °C)   Pulse (!) 50 (23 0500) Pulse  Min: 50  Max: 68   Respiratory Rate (!) 22 (23 0500) Resp  Min: 19  Max: 40   Non-Invasive   Blood Pressure 114/74 (23 0500) BP  Min: 95/64  Max: 132/68   Arterial  Blood Pressure 130/68 (23 0500) Arterial Line BP  Min: 110/66  Max: 159/82   Pulse Oximetry 93 % (23 0500) SpO2  Min: 89 %  Max: 99 %     Oxygen: RA    Hemodynamics:      Last Value 24 Hour Range   CVP (!) 19 mmHg (23 0500) CVP (mmHg)  Min: 12 mmHg  Max: 27 mmHg   PAS/PAD (!) 38/31 (23 1400) No data recorded   CO 5 l/min (23 1500) No data recorded   CI (!) 2.3 l/min/m2 (23 1500) No data recorded   SV02 72 % (23 1500) No data recorded   SVR 1360 (dyne*sec)/cm5 (23 1500)   No data recorded       Weight over the past 48 Hours:   Patient Vitals for the past 48 hrs:   Weight   23 0600 105.4 kg (232 lb 5.8 oz)   23 0600 103.3 kg (227 lb 11.8 oz)      Admit Weight:   Weight: 108.8 kg (239 lb 13.8 oz) (23)  BMI:   BMI (Calculated): 36.47 (23)    Intake/Output:    Last Stool Occurrence: 1 (23 1515)    I/O this shift:  In: -   Out: 190 [Urine:190]    I/O last 3 completed shifts:  In: 2278.9 [P.O.:1160; I.V.:668.9; IV Piggyback:450]  Out: 4795 [Urine:4795]      Intake/Output Summary (Last 24 hours) at 2023 0830  Last data filed at 2023 0700  Gross per 24  hour   Intake 2278.91 ml   Output 4735 ml   Net -2456.09 ml       Rhythm: SB/BB/1ST Degree    Physical Exam:   Heart: Regular rate and rhythm and Distant  Lungs: Diminished breath sounds  bilaterally  Abdomen: semi-firm, mildly distended, hypoactive BT, tolerating PO  Incision(s): Sternal MAGDI  Neurologic: Grossly normal  Extremities:Warm extremities. Generalized edema    Laboratory Results:    Recent Labs   Lab 06/04/23  0403 06/03/23  1655 06/03/23  1218 06/03/23  0607 06/02/23  0237 06/01/23  0414 05/31/23  1733 05/31/23  0331 05/30/23  2029 05/30/23  0327 05/29/23  1343 05/29/23  0352 05/28/23  1210 05/28/23  1209   SODIUM 140  --   --  140 143 144  --  141  --  138  --  143   < >  --    POTASSIUM 3.8 4.2 3.6 3.8 3.9 4.4  4.5 4.0 3.9   < > 4.2   < > 3.5   < >  --    CHLORIDE 103  --   --  104 108 109  --  105  --  103  --  104   < >  --    CO2 30  --   --  32 30 28  --  31  --  28  --  29   < >  --    BUN 56*  --   --  48* 52* 68*  --  68*  --  73*  --  74*   < >  --    CREATININE 1.81*  --   --  1.79* 1.83* 2.14*  --  1.74*  --  1.70*  --  1.87*   < >  --    GLUCOSE 116*  --   --  147* 126* 140*  --  143*  --  120*  --  118*   < >  --    MG  --   --   --  2.9* 2.8* 3.6* 4.4* 3.0*  --  2.9*  --  3.0*   < >  --    WBC 14.8*  --   --  15.5*  --  13.6* 18.7* 10.8  --   --   --  11.1*  --  11.3*   HGB 10.4*  --   --  10.8*  --  10.8* 10.5* 14.8  --   --   --  13.6  --  13.3   HCT 32.4*  --   --  34.0*  --  33.2* 32.6* 44.2  --   --   --  41.6  --  40.4     --   --  144  --  120* 160 212  --   --   --  196  --  210   PT  --   --   --   --   --  13.5* 14.3*  --   --   --   --   --   --  12.8*   INR  --   --   --   --   --  1.3 1.4  --   --   --   --   --   --  1.3   PTT  --   --   --   --   --  29 30  --   --   --   --   --   --   --    BILIRUBIN  --   --   --   --   --   --   --  0.8  --  0.9  --  0.8   < >  --    ALKPT  --   --   --   --   --   --   --  55  --  50  --  52   < >  --    AST  --   --   --   --    --   --   --  46*  --  52*  --  56*   < >  --    GPT  --   --   --   --   --   --   --  82*  --  89*  --  104*   < >  --     < > = values in this interval not displayed.       ABG    Recent Labs   Lab 06/02/23  0244   APH 7.48*   APO2 84   AHCO3 31*   ASAT 98         Cultures: Reviewed    Chest X-Ray: Reviewed    Medications/Infusions:  Scheduled:   • doxycycline hyclate (VIBRAMYCIN) capsule 100 mg Oral 2 times per day   • AMIODarone (PACERONE) tablet 400 mg Oral Daily   • insulin lispro (ADMELOG,HumaLOG) - Correction Dose Subcutaneous TID WC   • buPROPion XL (WELLBUTRIN XL) tablet 150 mg Oral Daily   • melatonin tablet 6 mg Oral Nightly   • OLANZapine (ZyPREXA) tablet 5 mg Oral Nightly   • pramipexole (MIRAPEX) tablet 0.5 mg Oral Nightly   • aspirin chewable 81 mg Oral Daily   • famotidine (PEPCID) tablet 20 mg Oral Daily   • VANCOMYCIN - PHARMACIST MONITORED Misc Does not apply See Admin Instructions   • cefTAZidime (FORTAZ) 2,000 mg in sodium chloride 0.9 % 100 mL IVPB Intravenous 2 times per day   • vancomycin (VANCOCIN) 750 mg in sodium chloride 0.9 % 250 mL IVPB Intravenous Q24H   • magnesium sulfate 2 g in 50 mL premix IVPB Intravenous Once   • docusate sodium-sennosides (SENOKOT S) 50-8.6 MG 2 tablet Oral Daily   • polyethylene glycol (MIRALAX) packet 17 g Oral BID   • cholecalciferol (VITAMIN D) tablet 25 mcg Oral Daily   • levothyroxine (SYNTHROID, LEVOTHROID) tablet 100 mcg Oral QAM AC   • pravastatin (PRAVACHOL) tablet 40 mg Oral Nightly       Continuous Infusions:   Current Facility-Administered Medications   Medication Dose Route Frequency Provider Last Rate Last Admin   • dextrose 5 % / sodium chloride 0.45% with KCl 40 mEq infusion   Intravenous Continuous PRN Shayy Couch PA-C       • dextrose 5 % / sodium chloride 0.45% infusion   Intravenous Continuous PRN Shayy Couch PA-C 10 mL/hr at 06/04/23 0800 Maintain Infusion Rate/Dose at 06/04/23 0800   • dextrose 5 % / sodium chloride  [de-identified] : 46 year old M presents for interpretation of recent CAT scan results and continued discussion regarding an upcoming cervical reconstruction. Patient still complains of worsening paresthesia in his LUE that gets worse when he lays down. He has complaints of  weakness in his left hand as well. JACKLYN questionnaire positive due to this LUE  weakness. 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 This all occurred in the DR. His gait and strength continue to decline. Currently gainfully employed as a . \par \par  0.45% infusion   Intravenous Continuous PRN Shayy Couch PA-C       • sodium chloride 0.9% infusion   Intravenous Continuous PRN Shayy Couch PA-C       • sodium chloride 0.9% infusion   Intravenous Continuous PRN Shayy Couch PA-C       • sodium chloride 0.9% infusion   Intravenous Continuous PRN Shayy Couch PA-C       • vasopressin (VASOSTRICT) 20 unit/100 mL dextrose 5 % infusion  0-0.1 Units/min Intravenous Continuous Shayy Couch PA-C   Stopped at 05/31/23 7526       CMS Criteria:  ASA: Yes    BB: Beta-blocker not ordered due to other: bradycardia    Statin:Yes    ACE: ACEI / ARB not ordered due to Renal Insufficiency    Surgical Care Improvement Project:  Cammie in Place: Yes, plan to D/C    DVT/VTE Prophylaxis:  VTE Pharmacologic Prophylaxis: No pharmacologic Venous Thromboembolism prophylaxis due to Active Bleeding / Risk of Bleeding  VTE Mechanical Prophylaxis: Yes    Antibiotics D/C'd within 24 hours of surgery end: Yes    Beta Blocker: Beta-blocker not ordered due to other: Inotropes, Hypotension     Surgical Central Line: Yes, medically necessary    Cardiologist: Dr. Moreno [place]  EF:   Ejection Fraction   Date Value Ref Range Status   05/27/2023 29 % Final     Nares MRSA:.Negative   MSSA: Negative  Hemoglobin A1c:   Hemoglobin A1C (%)   Date Value   05/26/2023 7.0 (H)     Preoperative Creatinine:   Creatinine (mg/dL)   Date Value   06/04/2023 1.81 (H)       STS Risk of Mortality:: 6.610%    Assessment/Plan:  S/P: redo-sternotomy, redo-aortic valve replacement (25mm Inspiris Resilia tissue valve), redo-mitral valve replacement (29mm Epic tissue valve) and removal of large vegetation/thrombus in left atrium  Intra-op vegetation/concern for endocarditis: Intra-op cultures pending with NGTD. Empiric antibiotics per ID  HFrEF: No BB d/t rhythm/bradycardia. Will discuss need for lifevest with MD  Frequent PVC/bradycardia : bradycardia with BBB,  [Ataxia] : no ataxia [Incontinence] : no incontinence intermittent periods of pacing. EP consulted. Decrease PO amio dose for bradycardia   Right pneumothorax: resolved  BRIE on CKD: Nephrology following. Stop bumex gtt, dc rochelle   DM: Change to SSI. Lantus x1      Pathways:  Wires Out: No  Ambulating: Yes    Discussed with or notes reviewed:  Patient, RN and MD    Discharge Disposition: To be determined    [Loss of Dexterity] : good dexterity [Urinary Ret.] : no urinary retention

## 2023-06-15 ENCOUNTER — NON-APPOINTMENT (OUTPATIENT)
Age: 48
End: 2023-06-15

## 2023-06-16 ENCOUNTER — APPOINTMENT (OUTPATIENT)
Dept: RADIOLOGY | Facility: CLINIC | Age: 48
End: 2023-06-16
Payer: COMMERCIAL

## 2023-06-16 ENCOUNTER — OUTPATIENT (OUTPATIENT)
Dept: OUTPATIENT SERVICES | Facility: HOSPITAL | Age: 48
LOS: 1 days | End: 2023-06-16
Payer: COMMERCIAL

## 2023-06-16 DIAGNOSIS — Z98.1 ARTHRODESIS STATUS: Chronic | ICD-10-CM

## 2023-06-16 DIAGNOSIS — M79.672 PAIN IN LEFT FOOT: ICD-10-CM

## 2023-06-16 PROCEDURE — 73630 X-RAY EXAM OF FOOT: CPT | Mod: 26,LT

## 2023-06-16 PROCEDURE — 73630 X-RAY EXAM OF FOOT: CPT

## 2023-07-24 ENCOUNTER — APPOINTMENT (OUTPATIENT)
Dept: CARDIOLOGY | Facility: CLINIC | Age: 48
End: 2023-07-24
Payer: COMMERCIAL

## 2023-07-24 ENCOUNTER — NON-APPOINTMENT (OUTPATIENT)
Age: 48
End: 2023-07-24

## 2023-07-24 VITALS
BODY MASS INDEX: 36.4 KG/M2 | HEIGHT: 71 IN | OXYGEN SATURATION: 98 % | DIASTOLIC BLOOD PRESSURE: 78 MMHG | SYSTOLIC BLOOD PRESSURE: 123 MMHG | WEIGHT: 260 LBS | HEART RATE: 77 BPM

## 2023-07-24 PROCEDURE — 99214 OFFICE O/P EST MOD 30 MIN: CPT | Mod: 25

## 2023-07-24 PROCEDURE — 93000 ELECTROCARDIOGRAM COMPLETE: CPT

## 2023-07-24 RX ORDER — KETOROLAC TROMETHAMINE 10 MG/1
10 TABLET, FILM COATED ORAL 3 TIMES DAILY
Qty: 15 | Refills: 0 | Status: DISCONTINUED | COMMUNITY
Start: 2022-09-02 | End: 2023-07-24

## 2023-07-24 RX ORDER — METHYLPREDNISOLONE 4 MG/1
4 TABLET ORAL
Qty: 1 | Refills: 0 | Status: DISCONTINUED | COMMUNITY
Start: 2022-09-02 | End: 2023-07-24

## 2023-07-24 NOTE — HISTORY OF PRESENT ILLNESS
[FreeTextEntry1] : Pt is a 48 PMH HLD, BMI 36, JIA.  Pt had cervical spine surgery 03/08/2021.  A few weeks later he was having a hard time breathing and passed out, was taken to [facility] and found to have PE s/p mechanical thrombectomy.  TTE showed RVE with decreased function, sev pHTN.   \par TTE 07/2021 showed normal LV and RV function, RVSP 13mmHg \par TTE 07/2022 EF 60%, normal RV function, RVSP 26\par He has stopped Eliquis 01/2022\par \par Pt reports feeling well and has no active cardiac complaints - denies CP, SOB, palpitations, dizziness, syncope, edema, orthopnea, PND, orthopnea.  No exertional symptoms.\par No new hospitalizations, emergency room/urgent care visits, new medical diagnoses, new medications, surgery, or cardiac testing since last OV.   \par \par PMH: HLD, PE after cervical surgery 03/2021, BMI 37\par Smoking status: never\par social ETOH\par no drug use\par Current exercise: none\par Daily water intake:\par Daily caffeine intake:\par OTC medications: \par Family hx: parents HTN/HLD/DM\par \par

## 2023-07-24 NOTE — DISCUSSION/SUMMARY
[FreeTextEntry1] : Pt is a 48 PMH HLD, BMI 36.  Pt had cervical spine surgery 03/08/2021.  A few weeks later he was having a hard time breathing and passed out, was taken to [facility] and found to have PE s/p mechanical thrombectomy.  TTE showed RVE with decreased function, sev pHTN. \par \par PE, R heart failure, pHTN:\par TTE 07/2021 normal LV and RV function, RVSP 13mmHg\par TTE 07/2022 EF 60%, normal RV function, RVSP 26\par pt is doing very well - exercising regularly\par repeat TTE\par \par HLD:\par c/w statin\par repeat labs\par \par JIA:\par following with pulm, on CPAP\par \par Pt will return in 12 mnths or sooner as needed but I encouraged communication via phone or portal if necessary. \par The described plan was discussed with the pt.  All questions and concerns were addressed to the best of my knowledge.

## 2023-08-03 ENCOUNTER — TRANSCRIPTION ENCOUNTER (OUTPATIENT)
Age: 48
End: 2023-08-03

## 2023-08-03 LAB
ALBUMIN SERPL ELPH-MCNC: 4.5 G/DL
ALP BLD-CCNC: 76 U/L
ALT SERPL-CCNC: 25 U/L
ANION GAP SERPL CALC-SCNC: 10 MMOL/L
AST SERPL-CCNC: 24 U/L
BILIRUB SERPL-MCNC: 0.5 MG/DL
BUN SERPL-MCNC: 17 MG/DL
CALCIUM SERPL-MCNC: 9 MG/DL
CHLORIDE SERPL-SCNC: 105 MMOL/L
CHOLEST SERPL-MCNC: 200 MG/DL
CO2 SERPL-SCNC: 24 MMOL/L
CREAT SERPL-MCNC: 1.14 MG/DL
EGFR: 79 ML/MIN/1.73M2
ESTIMATED AVERAGE GLUCOSE: 111 MG/DL
FOLATE SERPL-MCNC: >20 NG/ML
GLUCOSE SERPL-MCNC: 109 MG/DL
HBA1C MFR BLD HPLC: 5.5 %
HDLC SERPL-MCNC: 50 MG/DL
LDLC SERPL CALC-MCNC: 133 MG/DL
MAGNESIUM SERPL-MCNC: 2.1 MG/DL
NONHDLC SERPL-MCNC: 151 MG/DL
POTASSIUM SERPL-SCNC: 4.2 MMOL/L
PROT SERPL-MCNC: 6.8 G/DL
SODIUM SERPL-SCNC: 140 MMOL/L
TRIGL SERPL-MCNC: 98 MG/DL
TSH SERPL-ACNC: 1.96 UIU/ML
VIT B12 SERPL-MCNC: 840 PG/ML

## 2023-08-21 ENCOUNTER — APPOINTMENT (OUTPATIENT)
Dept: CARDIOLOGY | Facility: CLINIC | Age: 48
End: 2023-08-21
Payer: COMMERCIAL

## 2023-08-21 PROCEDURE — 93306 TTE W/DOPPLER COMPLETE: CPT

## 2023-08-24 ENCOUNTER — TRANSCRIPTION ENCOUNTER (OUTPATIENT)
Age: 48
End: 2023-08-24

## 2024-02-15 ENCOUNTER — APPOINTMENT (OUTPATIENT)
Dept: FAMILY MEDICINE | Facility: CLINIC | Age: 49
End: 2024-02-15
Payer: COMMERCIAL

## 2024-02-15 VITALS
DIASTOLIC BLOOD PRESSURE: 86 MMHG | SYSTOLIC BLOOD PRESSURE: 132 MMHG | HEART RATE: 72 BPM | HEIGHT: 71 IN | OXYGEN SATURATION: 98 % | WEIGHT: 265 LBS | BODY MASS INDEX: 37.1 KG/M2

## 2024-02-15 DIAGNOSIS — E55.9 VITAMIN D DEFICIENCY, UNSPECIFIED: ICD-10-CM

## 2024-02-15 DIAGNOSIS — Z13.1 ENCOUNTER FOR SCREENING FOR DIABETES MELLITUS: ICD-10-CM

## 2024-02-15 DIAGNOSIS — N28.1 CYST OF KIDNEY, ACQUIRED: ICD-10-CM

## 2024-02-15 DIAGNOSIS — G95.9 DISEASE OF SPINAL CORD, UNSPECIFIED: ICD-10-CM

## 2024-02-15 DIAGNOSIS — Z87.39 PERSONAL HISTORY OF OTHER DISEASES OF THE MUSCULOSKELETAL SYSTEM AND CONNECTIVE TISSUE: ICD-10-CM

## 2024-02-15 DIAGNOSIS — E66.9 OBESITY, UNSPECIFIED: ICD-10-CM

## 2024-02-15 DIAGNOSIS — Z00.00 ENCOUNTER FOR GENERAL ADULT MEDICAL EXAMINATION W/OUT ABNORMAL FINDINGS: ICD-10-CM

## 2024-02-15 DIAGNOSIS — Z12.11 ENCOUNTER FOR SCREENING FOR MALIGNANT NEOPLASM OF COLON: ICD-10-CM

## 2024-02-15 DIAGNOSIS — R22.30 LOCALIZED SWELLING, MASS AND LUMP, UNSPECIFIED UPPER LIMB: ICD-10-CM

## 2024-02-15 DIAGNOSIS — M54.12 DISEASE OF SPINAL CORD, UNSPECIFIED: ICD-10-CM

## 2024-02-15 DIAGNOSIS — Z12.5 ENCOUNTER FOR SCREENING FOR MALIGNANT NEOPLASM OF PROSTATE: ICD-10-CM

## 2024-02-15 DIAGNOSIS — G47.33 OBSTRUCTIVE SLEEP APNEA (ADULT) (PEDIATRIC): ICD-10-CM

## 2024-02-15 DIAGNOSIS — I27.20 PULMONARY HYPERTENSION, UNSPECIFIED: ICD-10-CM

## 2024-02-15 DIAGNOSIS — E78.00 PURE HYPERCHOLESTEROLEMIA, UNSPECIFIED: ICD-10-CM

## 2024-02-15 DIAGNOSIS — Z86.69 PERSONAL HISTORY OF OTHER DISEASES OF THE NERVOUS SYSTEM AND SENSE ORGANS: ICD-10-CM

## 2024-02-15 DIAGNOSIS — I50.810 RIGHT HEART FAILURE, UNSPECIFIED: ICD-10-CM

## 2024-02-15 DIAGNOSIS — Z13.29 ENCOUNTER FOR SCREENING FOR OTHER SUSPECTED ENDOCRINE DISORDER: ICD-10-CM

## 2024-02-15 PROCEDURE — 99396 PREV VISIT EST AGE 40-64: CPT

## 2024-02-15 PROCEDURE — 99213 OFFICE O/P EST LOW 20 MIN: CPT | Mod: 25

## 2024-02-15 RX ORDER — COVID-19 ANTIGEN TEST
KIT MISCELLANEOUS
Qty: 8 | Refills: 0 | Status: DISCONTINUED | COMMUNITY
Start: 2022-07-26 | End: 2024-02-15

## 2024-02-15 NOTE — HEALTH RISK ASSESSMENT
[Good] : ~his/her~  mood as  good [Yes] : Yes [Monthly or less (1 pt)] : Monthly or less (1 point) [1 or 2 (0 pts)] : 1 or 2 (0 points) [Never (0 pts)] : Never (0 points) [No] : In the past 12 months have you used drugs other than those required for medical reasons? No [No falls in past year] : Patient reported no falls in the past year [0] : 2) Feeling down, depressed, or hopeless: Not at all (0) [PHQ-2 Negative - No further assessment needed] : PHQ-2 Negative - No further assessment needed [HIV test declined] : HIV test declined [Hepatitis C test declined] : Hepatitis C test declined [With Family] : lives with family [Employed] : employed [] :  [Sexually Active] : sexually active [Feels Safe at Home] : Feels safe at home [Fully functional (bathing, dressing, toileting, transferring, walking, feeding)] : Fully functional (bathing, dressing, toileting, transferring, walking, feeding) [Fully functional (using the telephone, shopping, preparing meals, housekeeping, doing laundry, using] : Fully functional and needs no help or supervision to perform IADLs (using the telephone, shopping, preparing meals, housekeeping, doing laundry, using transportation, managing medications and managing finances) [Former] : Former [0-4] : 0-4 [< 15 Years] : < 15 Years [Audit-CScore] : 1 [de-identified] : needs improvement [de-identified] : needs improvement [[id number]] : 0 [High Risk Behavior] : no high risk behavior [Reports changes in hearing] : Reports no changes in hearing [Reports changes in vision] : Reports no changes in vision [Reports changes in dental health] : Reports no changes in dental health [de-identified] : 3 years intermittent smoking, quit 8 yrs ago

## 2024-02-15 NOTE — PHYSICAL EXAM
[Normal] : affect was normal and insight and judgment were intact [de-identified] : left axilla lump, 2x2cm, nontender, no erythema

## 2024-02-15 NOTE — HISTORY OF PRESENT ILLNESS
[FreeTextEntry1] : CPE [de-identified] : 48 yo male presents for annual physical. Reports feeling well. he notes a lump in his armpit left side that has been there for a year, he's not sure if occurred after shaving. It does not hurt. Denies fever, chills, cp, palpitations, sob, nvcd.

## 2024-02-22 LAB
25(OH)D3 SERPL-MCNC: 35.4 NG/ML
ALBUMIN SERPL ELPH-MCNC: 4.9 G/DL
ALP BLD-CCNC: 79 U/L
ALT SERPL-CCNC: 23 U/L
ANION GAP SERPL CALC-SCNC: 14 MMOL/L
APPEARANCE: CLEAR
AST SERPL-CCNC: 29 U/L
BACTERIA: NEGATIVE /HPF
BASOPHILS # BLD AUTO: 0.06 K/UL
BASOPHILS NFR BLD AUTO: 0.7 %
BILIRUB SERPL-MCNC: 0.6 MG/DL
BILIRUBIN URINE: NEGATIVE
BLOOD URINE: NEGATIVE
BUN SERPL-MCNC: 15 MG/DL
CALCIUM SERPL-MCNC: 9.8 MG/DL
CAST: 0 /LPF
CHLORIDE SERPL-SCNC: 104 MMOL/L
CHOLEST SERPL-MCNC: 177 MG/DL
CO2 SERPL-SCNC: 23 MMOL/L
COLOR: YELLOW
CREAT SERPL-MCNC: 1.07 MG/DL
EGFR: 85 ML/MIN/1.73M2
EOSINOPHIL # BLD AUTO: 0.09 K/UL
EOSINOPHIL NFR BLD AUTO: 1.1 %
EPITHELIAL CELLS: 1 /HPF
ESTIMATED AVERAGE GLUCOSE: 105 MG/DL
GLUCOSE QUALITATIVE U: NEGATIVE MG/DL
GLUCOSE SERPL-MCNC: 81 MG/DL
HBA1C MFR BLD HPLC: 5.3 %
HCT VFR BLD CALC: 45.6 %
HDLC SERPL-MCNC: 54 MG/DL
HGB BLD-MCNC: 16.1 G/DL
IMM GRANULOCYTES NFR BLD AUTO: 0.2 %
KETONES URINE: NEGATIVE MG/DL
LDLC SERPL CALC-MCNC: 103 MG/DL
LEUKOCYTE ESTERASE URINE: NEGATIVE
LYMPHOCYTES # BLD AUTO: 3.2 K/UL
LYMPHOCYTES NFR BLD AUTO: 39.7 %
MAN DIFF?: NORMAL
MCHC RBC-ENTMCNC: 29.2 PG
MCHC RBC-ENTMCNC: 35.3 GM/DL
MCV RBC AUTO: 82.6 FL
MICROSCOPIC-UA: NORMAL
MONOCYTES # BLD AUTO: 0.51 K/UL
MONOCYTES NFR BLD AUTO: 6.3 %
NEUTROPHILS # BLD AUTO: 4.19 K/UL
NEUTROPHILS NFR BLD AUTO: 52 %
NITRITE URINE: NEGATIVE
NONHDLC SERPL-MCNC: 123 MG/DL
PH URINE: 5.5
PLATELET # BLD AUTO: 214 K/UL
POTASSIUM SERPL-SCNC: 4.7 MMOL/L
PROT SERPL-MCNC: 7.4 G/DL
PROTEIN URINE: NEGATIVE MG/DL
PSA FREE FLD-MCNC: 55 %
PSA FREE SERPL-MCNC: 0.15 NG/ML
PSA SERPL-MCNC: 0.27 NG/ML
RBC # BLD: 5.52 M/UL
RBC # FLD: 13.1 %
RED BLOOD CELLS URINE: 1 /HPF
SODIUM SERPL-SCNC: 141 MMOL/L
SPECIFIC GRAVITY URINE: 1.03
TRIGL SERPL-MCNC: 111 MG/DL
TSH SERPL-ACNC: 2.19 UIU/ML
UROBILINOGEN URINE: 0.2 MG/DL
WBC # FLD AUTO: 8.07 K/UL
WHITE BLOOD CELLS URINE: 1 /HPF

## 2024-02-26 ENCOUNTER — APPOINTMENT (OUTPATIENT)
Dept: ULTRASOUND IMAGING | Facility: IMAGING CENTER | Age: 49
End: 2024-02-26
Payer: COMMERCIAL

## 2024-02-26 ENCOUNTER — APPOINTMENT (OUTPATIENT)
Dept: MAMMOGRAPHY | Facility: IMAGING CENTER | Age: 49
End: 2024-02-26
Payer: COMMERCIAL

## 2024-02-26 ENCOUNTER — OUTPATIENT (OUTPATIENT)
Dept: OUTPATIENT SERVICES | Facility: HOSPITAL | Age: 49
LOS: 1 days | End: 2024-02-26
Payer: COMMERCIAL

## 2024-02-26 DIAGNOSIS — Z98.1 ARTHRODESIS STATUS: Chronic | ICD-10-CM

## 2024-02-26 DIAGNOSIS — R22.30 LOCALIZED SWELLING, MASS AND LUMP, UNSPECIFIED UPPER LIMB: ICD-10-CM

## 2024-02-26 DIAGNOSIS — Z87.81 PERSONAL HISTORY OF (HEALED) TRAUMATIC FRACTURE: Chronic | ICD-10-CM

## 2024-02-26 PROCEDURE — 76882 US LMTD JT/FCL EVL NVASC XTR: CPT | Mod: 26,LT

## 2024-02-26 PROCEDURE — 76882 US LMTD JT/FCL EVL NVASC XTR: CPT

## 2024-03-03 DIAGNOSIS — L72.3 SEBACEOUS CYST: ICD-10-CM

## 2024-03-18 ENCOUNTER — APPOINTMENT (OUTPATIENT)
Dept: DERMATOLOGY | Facility: CLINIC | Age: 49
End: 2024-03-18
Payer: COMMERCIAL

## 2024-03-18 PROCEDURE — 99202 OFFICE O/P NEW SF 15 MIN: CPT

## 2024-03-24 ENCOUNTER — RESULT REVIEW (OUTPATIENT)
Age: 49
End: 2024-03-24

## 2024-03-25 ENCOUNTER — APPOINTMENT (OUTPATIENT)
Dept: DERMATOLOGY | Facility: CLINIC | Age: 49
End: 2024-03-25
Payer: COMMERCIAL

## 2024-03-25 DIAGNOSIS — L72.0 EPIDERMAL CYST: ICD-10-CM

## 2024-03-25 PROCEDURE — 12032 INTMD RPR S/A/T/EXT 2.6-7.5: CPT

## 2024-03-25 PROCEDURE — 11403 EXC TR-EXT B9+MARG 2.1-3CM: CPT

## 2024-03-25 RX ORDER — CLINDAMYCIN HYDROCHLORIDE 300 MG/1
300 CAPSULE ORAL
Qty: 21 | Refills: 0 | Status: ACTIVE | COMMUNITY
Start: 2024-03-25 | End: 1900-01-01

## 2024-03-25 NOTE — HISTORY OF PRESENT ILLNESS
[FreeTextEntry1] : Cyst Left Axillary vault [de-identified] : Dermatologic Surgery Consultation  03/25/2024   Referred by: Dr. Batres  We had the pleasure of seeing your patient in consultation for Dermatologic Surgery.  Mr. BRAYDON LOJA is a 49 year old M who presents for evaluation of a cyst in the left axillary vault. Had been present x mos, getting larger. Had not drained but had been tender in the past. Interested in excision.   Pertinent positives noted below  History of HIV or hepatitis: No Blood thinners: none Antibiotic Prophylaxis: None  Medical implants: None  Social History: no tobacco or alcohol   The patient's review of systems questionnaire was reviewed. Education needs were identified. There were no barriers to learning.  Dermatologic surgery consultation for Cyst left Axillary Vault  -- I explained the treatment options of excision vs. clinical observation.  Based on the size and bothersome nature advised excision is reasonable.  -I explained that following extirpation there will be a full thickness defect of the involved area. The reconstructive options will be based on the defect size and surrounding tissue laxity of the involved area. Primary closure is only possible for smaller defects. For larger defects, local tissue rearrangement or skin grafting may be necessary. Risks following layered primary closure or local tissue rearrangement include wound dehiscence, contour irregularity, bleeding, infection, and paresthesia (nerve damage including sensory deficit or motor weakness). Risks following skin grafting include wound dehiscence, skin graft nonadherence (partial or complete), contour irregularity, bleeding, infection, paresthesia, and donor site complications. I explained that the patient will need to abstain from physical activity for 1-2 weeks following the surgery, that they would heal with a scar, and also discussed the chances of infection, bleeding, potential sensory or motor nerve damage, and recurrence.  The patient indicated that s/he understood the risks and wished to proceed today -- In particular, for reconstruction we discussed linear closure  Thank you for this dermatologic surgery referral. We recommended that Mr. BRAYDON LOJA follow up with His referring dermatologist for routine skin exams following surgery.

## 2024-03-25 NOTE — ASSESSMENT
[FreeTextEntry1] : Cyst left Axillary Vault --excision performed today -- intermediate linear layered repair performed -- f/u for wound check PRN -Clindamycin 300 mg TID x 1 week -- f/u for routine skin exams as previously recommended by His referring dermatologist.   Thank you for this dermatologic surgery referral.   Tashi Lozada MD Physician, Dermatology & Dermatologic Surgery [facility]

## 2024-03-25 NOTE — PROCEDURE
[TextEntry] : Excision Operative Note   Indications:  Alternative therapies were discussed. Given the size, location, and lesion type we decided that excision offers the best option for treatment.   Preoperative diagnosis: Cyst Postoperative diagnosis: Same Location: left axillary vault Anesthetic: 0.5% lidocaine with 1:200,000 epinephrine Antiseptic: Chlorhexidine or Betadine Attending surgeon: Tashi Lozada MD Assistant(s): Brooklynn Lindsey RN, Dorothea Mendoza MA Estimated blood loss: < 5cc Complications: None Initial lesion size: 2.5 cm x 1.9 cm  Surgical margin: 0 cm  Total excision size (always includes surgical margin):  2.5 cm x 1.9 cm  Closure type: intermediate linear layered Length of closure: 5.3 cm  Suture material: 4-0 vicryl, 4-0 chromic  The patient was brought back to the minor surgery operating room. Prior to the procedure the medical record was reviewed by the physician. A pre-procedure checklist in the presence of the patient was reviewed. A surgery " timeout " was observed. The following were confirmed during the surgery timeout: patient name, confirmation of consent, patient position, allergies, type of anesthesia, and antibiotic given (if any, see emr entry for any medications).  The risks of the procedure, including bleeding, infection, nerve damage, possibility of recurrence, failure of the repair, and the certainty of a scar were discussed with the patient. Alternatives to the procedure, as well as their risks and benefits, were also discussed. The patient was offered an opportunity to ask any questions and, after expressing understanding of the proposed procedure and its alternatives, the patient signed the consent form. The patient was placed in appropriate position and the area was prepared and draped in the usual manner. Local anesthesia was obtained with the above mentioned anesthetic. Using a sterile surgical marking pen, an elliptical (or circular) excision was designed around the lesion and along relaxed skin tension lines, if possible, incorporating the margin of normal-appearing skin mentioned above. A full thickness skin incision using a #15 blade Bard-Rasta scalpel was performed and the lesion was excised sharply in the subcutaneous plane.  The specimen was submitted for histopathologic examination.   The defect was moderately undermined in the subcutaneous plane if needed to reduce wound closure tension and allow for easy wound edge eversion.  Hemostasis was maintained with the electrosurgical unit.  Interrupted, inverted, subcuticular buried mattress sutures were placed using the material mentioned above to approximate the dermal edges of the wound. Interrupted and running simple cutaneous sutures were used to further approximate and dora the wound edges.  The final length of the repair is listed in the summary above. A firm pressure dressing was applied over vaseline ointment and Telfa. Verbal postoperative wound care instructions were given and a wound care hand out was dispensed.  The patient was asked to follow up for a wound check as specified. The patient was also told to call us at anytime for signs of wound infection or any other concerns they might have regarding the surgery or the healing process.   The patient tolerated the procedure well and ambulated from the operatory without problem.

## 2024-03-25 NOTE — PHYSICAL EXAM
[Alert] : alert [Oriented x 3] : ~L oriented x 3 [Well Nourished] : well nourished [Conjunctiva Non-injected] : conjunctiva non-injected [No Visual Lymphadenopathy] : no visual  lymphadenopathy [No Clubbing] : no clubbing [No Edema] : no edema [No Bromhidrosis] : no bromhidrosis [No Chromhidrosis] : no chromhidrosis [Hair] : Hair [Scalp] : Scalp [Face] : Face [Nose] : Nose [Eyelids] : Eyelids [Ears] : Ears [Lips] : Lips [Neck] : Neck [R Arm] : R Arm [L Arm] : L Arm [FreeTextEntry3] : -left axilla with 2.5 cm x 1.9 cm firm subcutaneous nodule with puctum

## 2024-04-29 ENCOUNTER — RX RENEWAL (OUTPATIENT)
Age: 49
End: 2024-04-29

## 2024-04-29 RX ORDER — ATORVASTATIN CALCIUM 20 MG/1
20 TABLET, FILM COATED ORAL
Qty: 90 | Refills: 0 | Status: ACTIVE | COMMUNITY
Start: 2020-12-08 | End: 1900-01-01

## 2024-06-13 ENCOUNTER — APPOINTMENT (OUTPATIENT)
Dept: ORTHOPEDIC SURGERY | Facility: CLINIC | Age: 49
End: 2024-06-13

## 2024-06-13 VITALS
HEART RATE: 82 BPM | SYSTOLIC BLOOD PRESSURE: 135 MMHG | BODY MASS INDEX: 37.8 KG/M2 | DIASTOLIC BLOOD PRESSURE: 65 MMHG | WEIGHT: 270 LBS | HEIGHT: 71 IN

## 2024-06-13 DIAGNOSIS — Z98.1 ARTHRODESIS STATUS: ICD-10-CM

## 2024-06-13 DIAGNOSIS — M60.9 MYOSITIS, UNSPECIFIED: ICD-10-CM

## 2024-06-13 DIAGNOSIS — M51.9 UNSPECIFIED THORACIC, THORACOLUMBAR AND LUMBOSACRAL INTERVERTEBRAL DISC DISORDER: ICD-10-CM

## 2024-06-13 PROCEDURE — 72100 X-RAY EXAM L-S SPINE 2/3 VWS: CPT

## 2024-06-13 PROCEDURE — 99214 OFFICE O/P EST MOD 30 MIN: CPT | Mod: 25

## 2024-06-13 PROCEDURE — 20552 NJX 1/MLT TRIGGER POINT 1/2: CPT

## 2024-06-14 NOTE — PROCEDURE
[de-identified] : Verbal consent was obtained and all questions, comments and concerns were addressed.  After bilateral lumbar paraspinal trigger point in the affected area into superficial muscular trigger point was cleansed with alcohol prep X 3, ethyl chloride was used as local anesthetic.  Under sterile precautions 1cc of 1 percent lidocaine without epinephrine, plus 10 mg depomedrol, were instilled into the affected trigger points.  Patient tolerated procedure well. Dry sterile dressing was placed and patient described relief of pain 5 minutes after procedure was performed.

## 2024-06-14 NOTE — HISTORY OF PRESENT ILLNESS
[Worsening] : worsening [4] : a current pain level of 4/10 [10] : a maximum pain level of 10/10 [Bending] : worsened by bending [Lifting] : worsened by lifting [Rest] : relieved by rest [de-identified] : 49-year-old male presents today for follow-up evaluation of lower back pain.  He is status post large cervical reconstruction with a two-level corpectomy anterior posterior cervical fusion in March 2021.  He presents today with more myositic type pain in the lumbar spine.  Pain has been present for few weeks.  There was no inciting event or trauma.  Pain is worse with increased activity such as lifting bending twisting.  He has been taking over-the-counter anti-inflammatories with minimal relief in symptoms.  No radiating pain down bilateral lower extremities.  No numbness tingling weakness.  JACKLYN questionnaire is negative. There is no ataxia or other abnormal gait. Patient is not falling more than usual and does not have any decrease in dexterity.  No bowel / bladder incontinence. No saddle anesthesia. [Ataxia] : no ataxia [Incontinence] : no incontinence [Urinary Ret.] : no urinary retention [de-identified] :  trigger point injection

## 2024-06-14 NOTE — PHYSICAL EXAM
[Antalgic] : antalgic [de-identified] : CONSTITUTIONAL: The patient is a very pleasant individual who is well-nourished and who appears stated age. PSYCHIATRIC: The patient is alert and oriented X 3 and in no apparent distress, and participates with orthopedic evaluation well. HEAD: Atraumatic and is nonsyndromic in appearance. EENT: No visible thyromegaly, EOMI. RESPIRATORY: Respiratory rate is regular, not dyspneic on examination. LYMPHATICS: There is no inguinal lymphadenopathy INTEGUMENTARY: Skin is clean, dry, and intact about the bilateral lower extremities and lumbar spine. VASCULAR: There is brisk capillary refill about the bilateral lower extremities. NEUROLOGIC: There are no pathologic reflexes. There is no decrease in sensation of the bilateral lower extremities on manual  examination. Deep tendon reflexes are well maintained at 2+/4 of the bilateral lower extremities and are symmetric.. MUSCULOSKELETAL: There is no visible muscular atrophy. Manual motor strength is well maintained in the bilateral lower extremities. Range of motion of lumbar spine is well maintained. The patient ambulates in a non-myelopathic manner. Negative tension sign and straight leg raise bilaterally. Quad extension, ankle dorsiflexion, EHL, plantar flexion, and ankle eversion are well preserved. Normal secondary orthopaedic exam of bilateral hips, greater trochanteric area, knees and ankles. Positive tenderness to palpation to the lower paraspinal musculature,  consistent with bilateral lumbar trigger point [de-identified] : X-rays of the lumbar spine taken on today's date shows well-maintained lumbar lordosis L5-S1 mild loss of disc height.  Previous cervical x-rays showing a cervical corpectomy anterior cage plate as well as posterior instrumentation noted as well.

## 2024-06-14 NOTE — DISCUSSION/SUMMARY
[de-identified] : Maverick is a great sara known to us for a very large cervical reconstruction with a two-level corpectomy anterior posterior cervical fusion today with worsening low back pain he done very well with trigger point injections either provided on today's date patient is hollie 5 he Sensia on a as needed basis with regard to lumbar myositis and trigger points.  I like to see him back in approximately 1 year for routine clinical follow-up and monitoring of his cervical construct.

## 2024-07-01 ENCOUNTER — APPOINTMENT (OUTPATIENT)
Dept: CARDIOLOGY | Facility: CLINIC | Age: 49
End: 2024-07-01
Payer: COMMERCIAL

## 2024-07-01 ENCOUNTER — NON-APPOINTMENT (OUTPATIENT)
Age: 49
End: 2024-07-01

## 2024-07-01 VITALS
SYSTOLIC BLOOD PRESSURE: 118 MMHG | BODY MASS INDEX: 38.5 KG/M2 | DIASTOLIC BLOOD PRESSURE: 78 MMHG | OXYGEN SATURATION: 96 % | HEIGHT: 71 IN | WEIGHT: 275 LBS | HEART RATE: 90 BPM

## 2024-07-01 DIAGNOSIS — I26.99 OTHER PULMONARY EMBOLISM W/OUT ACUTE COR PULMONALE: ICD-10-CM

## 2024-07-01 PROCEDURE — 99214 OFFICE O/P EST MOD 30 MIN: CPT | Mod: 25

## 2024-07-01 PROCEDURE — 93000 ELECTROCARDIOGRAM COMPLETE: CPT

## 2024-07-26 ENCOUNTER — RX RENEWAL (OUTPATIENT)
Age: 49
End: 2024-07-26

## 2024-08-12 ENCOUNTER — APPOINTMENT (OUTPATIENT)
Dept: MRI IMAGING | Facility: CLINIC | Age: 49
End: 2024-08-12

## 2024-08-12 PROCEDURE — 70553 MRI BRAIN STEM W/O & W/DYE: CPT | Mod: 26

## 2024-08-12 PROCEDURE — 70544 MR ANGIOGRAPHY HEAD W/O DYE: CPT | Mod: 26,59

## 2024-09-23 NOTE — ASSESSMENT
[FreeTextEntry1] : Annual physical: f/u routine labwork Cervical myelopathy/spinal injury: s/p reconstructive surgery 3 yrs ago, s/p PT, recommend low-mod int exercise/stretching, f/u orthopedist HLD: Recommend low fat diet, wt loss, exercise, nutritional counseling provided, f/u lipid panel, c/w statin therapy Lumbar disc disease: recommend low-mod int exercise/stretching Pulm HTN: f/u pulmonology/cardiology PE: single episode 2021, occurred after covid19 vaccination, seen by hematology for hypercoag workup, f/u pulmonology/hematology Right heart failure: currently asymp, f/u cardiology Obesity: Recommend low fat diet, wt loss, exercise, and DASH diet. Renal cyst: asymp, will ct monitor JIA: resolution of jia after wt loss, f/u pulm Colon CA screening: refer to GI for colonoscopy Vitamin D def: f/u level Left axilla lump: no B symptoms, nontender, no erythema, US left axilla ordered RTC 3 wks
4 = No assist / stand by assistance

## 2024-12-23 ENCOUNTER — APPOINTMENT (OUTPATIENT)
Dept: FAMILY MEDICINE | Facility: CLINIC | Age: 49
End: 2024-12-23
Payer: COMMERCIAL

## 2024-12-23 VITALS
HEIGHT: 71 IN | DIASTOLIC BLOOD PRESSURE: 70 MMHG | BODY MASS INDEX: 39.76 KG/M2 | HEART RATE: 88 BPM | OXYGEN SATURATION: 98 % | WEIGHT: 284 LBS | SYSTOLIC BLOOD PRESSURE: 114 MMHG

## 2024-12-23 DIAGNOSIS — M77.11 LATERAL EPICONDYLITIS, RIGHT ELBOW: ICD-10-CM

## 2024-12-23 DIAGNOSIS — E78.2 MIXED HYPERLIPIDEMIA: ICD-10-CM

## 2024-12-23 DIAGNOSIS — M65.30 TRIGGER FINGER, UNSPECIFIED FINGER: ICD-10-CM

## 2024-12-23 PROCEDURE — 99214 OFFICE O/P EST MOD 30 MIN: CPT

## 2024-12-23 RX ORDER — ACETAMINOPHEN 500 MG/1
500 TABLET ORAL
Qty: 180 | Refills: 0 | Status: ACTIVE | COMMUNITY
Start: 2024-12-23

## 2025-01-11 NOTE — H&P PST ADULT - NSICDXFAMILYHX_GEN_ALL_CORE_FT
Problem: Discharge Planning  Goal: Discharge to home or other facility with appropriate resources  Outcome: Progressing     Problem: Safety - Adult  Goal: Free from fall injury  Outcome: Progressing      FAMILY HISTORY:  Family history of diabetes mellitus (DM)

## 2025-02-21 ENCOUNTER — APPOINTMENT (OUTPATIENT)
Dept: FAMILY MEDICINE | Facility: CLINIC | Age: 50
End: 2025-02-21

## 2025-05-13 ENCOUNTER — APPOINTMENT (OUTPATIENT)
Dept: FAMILY MEDICINE | Facility: CLINIC | Age: 50
End: 2025-05-13
Payer: COMMERCIAL

## 2025-05-13 VITALS
WEIGHT: 274 LBS | BODY MASS INDEX: 38.36 KG/M2 | DIASTOLIC BLOOD PRESSURE: 80 MMHG | OXYGEN SATURATION: 98 % | HEIGHT: 71 IN | HEART RATE: 88 BPM | SYSTOLIC BLOOD PRESSURE: 110 MMHG

## 2025-05-13 DIAGNOSIS — E78.00 PURE HYPERCHOLESTEROLEMIA, UNSPECIFIED: ICD-10-CM

## 2025-05-13 DIAGNOSIS — G95.9 DISEASE OF SPINAL CORD, UNSPECIFIED: ICD-10-CM

## 2025-05-13 DIAGNOSIS — R22.30 LOCALIZED SWELLING, MASS AND LUMP, UNSPECIFIED UPPER LIMB: ICD-10-CM

## 2025-05-13 DIAGNOSIS — M54.12 DISEASE OF SPINAL CORD, UNSPECIFIED: ICD-10-CM

## 2025-05-13 DIAGNOSIS — G47.33 OBSTRUCTIVE SLEEP APNEA (ADULT) (PEDIATRIC): ICD-10-CM

## 2025-05-13 DIAGNOSIS — E66.9 OBESITY, UNSPECIFIED: ICD-10-CM

## 2025-05-13 DIAGNOSIS — Z11.3 ENCOUNTER FOR SCREENING FOR INFECTIONS WITH A PREDOMINANTLY SEXUAL MODE OF TRANSMISSION: ICD-10-CM

## 2025-05-13 DIAGNOSIS — L72.0 EPIDERMAL CYST: ICD-10-CM

## 2025-05-13 DIAGNOSIS — Z00.00 ENCOUNTER FOR GENERAL ADULT MEDICAL EXAMINATION W/OUT ABNORMAL FINDINGS: ICD-10-CM

## 2025-05-13 DIAGNOSIS — N28.1 CYST OF KIDNEY, ACQUIRED: ICD-10-CM

## 2025-05-13 DIAGNOSIS — Z12.5 ENCOUNTER FOR SCREENING FOR MALIGNANT NEOPLASM OF PROSTATE: ICD-10-CM

## 2025-05-13 DIAGNOSIS — Z86.39 PERSONAL HISTORY OF OTHER ENDOCRINE, NUTRITIONAL AND METABOLIC DISEASE: ICD-10-CM

## 2025-05-13 DIAGNOSIS — Z87.2 PERSONAL HISTORY OF DISEASES OF THE SKIN AND SUBCUTANEOUS TISSUE: ICD-10-CM

## 2025-05-13 DIAGNOSIS — I26.99 OTHER PULMONARY EMBOLISM W/OUT ACUTE COR PULMONALE: ICD-10-CM

## 2025-05-13 DIAGNOSIS — E78.2 MIXED HYPERLIPIDEMIA: ICD-10-CM

## 2025-05-13 DIAGNOSIS — I27.20 PULMONARY HYPERTENSION, UNSPECIFIED: ICD-10-CM

## 2025-05-13 DIAGNOSIS — Z13.1 ENCOUNTER FOR SCREENING FOR DIABETES MELLITUS: ICD-10-CM

## 2025-05-13 DIAGNOSIS — Z13.29 ENCOUNTER FOR SCREENING FOR OTHER SUSPECTED ENDOCRINE DISORDER: ICD-10-CM

## 2025-05-13 DIAGNOSIS — R29.2 ABNORMAL REFLEX: ICD-10-CM

## 2025-05-13 DIAGNOSIS — I50.810 RIGHT HEART FAILURE, UNSPECIFIED: ICD-10-CM

## 2025-05-13 DIAGNOSIS — Z87.39 PERSONAL HISTORY OF OTHER DISEASES OF THE MUSCULOSKELETAL SYSTEM AND CONNECTIVE TISSUE: ICD-10-CM

## 2025-05-13 PROCEDURE — 99396 PREV VISIT EST AGE 40-64: CPT

## 2025-05-20 DIAGNOSIS — R74.01 ELEVATION OF LEVELS OF LIVER TRANSAMINASE LEVELS: ICD-10-CM

## 2025-05-20 DIAGNOSIS — R76.8 OTHER SPECIFIED ABNORMAL IMMUNOLOGICAL FINDINGS IN SERUM: ICD-10-CM

## 2025-05-20 DIAGNOSIS — D72.820 LYMPHOCYTOSIS (SYMPTOMATIC): ICD-10-CM

## 2025-05-20 LAB
ALBUMIN SERPL ELPH-MCNC: 4.7 G/DL
ALP BLD-CCNC: 99 U/L
ALT SERPL-CCNC: 32 U/L
ANION GAP SERPL CALC-SCNC: 16 MMOL/L
APPEARANCE: CLEAR
AST SERPL-CCNC: 42 U/L
BACTERIA: NEGATIVE /HPF
BASOPHILS # BLD AUTO: 0.04 K/UL
BASOPHILS NFR BLD AUTO: 0.5 %
BILIRUB SERPL-MCNC: 0.8 MG/DL
BILIRUBIN URINE: NEGATIVE
BLOOD URINE: NEGATIVE
BUN SERPL-MCNC: 16 MG/DL
C TRACH RRNA SPEC QL NAA+PROBE: NOT DETECTED
CALCIUM SERPL-MCNC: 9.7 MG/DL
CAST: 0 /LPF
CHLORIDE SERPL-SCNC: 105 MMOL/L
CHOLEST SERPL-MCNC: 171 MG/DL
CO2 SERPL-SCNC: 21 MMOL/L
COLOR: YELLOW
CREAT SERPL-MCNC: 1.11 MG/DL
EGFRCR SERPLBLD CKD-EPI 2021: 81 ML/MIN/1.73M2
EOSINOPHIL # BLD AUTO: 0.13 K/UL
EOSINOPHIL NFR BLD AUTO: 1.7 %
EPITHELIAL CELLS: 2 /HPF
ESTIMATED AVERAGE GLUCOSE: 114 MG/DL
GLUCOSE QUALITATIVE U: NEGATIVE MG/DL
GLUCOSE SERPL-MCNC: 86 MG/DL
HBA1C MFR BLD HPLC: 5.6 %
HBV CORE IGG+IGM SER QL: REACTIVE
HBV CORE IGM SER QL: NONREACTIVE
HBV E AB SER QL: NONREACTIVE
HBV E AG SER QL: NONREACTIVE
HBV SURFACE AB SER QL: REACTIVE
HBV SURFACE AG SER QL: NONREACTIVE
HCT VFR BLD CALC: 45.1 %
HCV AB SER QL: NONREACTIVE
HCV S/CO RATIO: 0.12 S/CO
HDLC SERPL-MCNC: 53 MG/DL
HGB BLD-MCNC: 15.3 G/DL
HIV1+2 AB SPEC QL IA.RAPID: NONREACTIVE
IMM GRANULOCYTES NFR BLD AUTO: 0.3 %
KETONES URINE: ABNORMAL MG/DL
LDLC SERPL-MCNC: 97 MG/DL
LEUKOCYTE ESTERASE URINE: NEGATIVE
LYMPHOCYTES # BLD AUTO: 3.55 K/UL
LYMPHOCYTES NFR BLD AUTO: 46.3 %
MAN DIFF?: NORMAL
MCHC RBC-ENTMCNC: 29.3 PG
MCHC RBC-ENTMCNC: 33.9 G/DL
MCV RBC AUTO: 86.4 FL
MICROSCOPIC-UA: NORMAL
MONOCYTES # BLD AUTO: 0.59 K/UL
MONOCYTES NFR BLD AUTO: 7.7 %
N GONORRHOEA RRNA SPEC QL NAA+PROBE: NOT DETECTED
NEUTROPHILS # BLD AUTO: 3.33 K/UL
NEUTROPHILS NFR BLD AUTO: 43.5 %
NITRITE URINE: NEGATIVE
NONHDLC SERPL-MCNC: 118 MG/DL
PH URINE: 5.5
PLATELET # BLD AUTO: 218 K/UL
POTASSIUM SERPL-SCNC: 4.7 MMOL/L
PROT SERPL-MCNC: 7.4 G/DL
PROTEIN URINE: NEGATIVE MG/DL
PSA FREE FLD-MCNC: 40 %
PSA FREE SERPL-MCNC: 0.13 NG/ML
PSA SERPL-MCNC: 0.31 NG/ML
RBC # BLD: 5.22 M/UL
RBC # FLD: 13.2 %
RED BLOOD CELLS URINE: 2 /HPF
SODIUM SERPL-SCNC: 142 MMOL/L
SOURCE AMPLIFICATION: NORMAL
SPECIFIC GRAVITY URINE: 1.03
T PALLIDUM AB SER QL IA: NEGATIVE
TRIGL SERPL-MCNC: 118 MG/DL
TSH SERPL-ACNC: 1.69 UIU/ML
UROBILINOGEN URINE: 0.2 MG/DL
WBC # FLD AUTO: 7.66 K/UL
WHITE BLOOD CELLS URINE: 2 /HPF

## 2025-05-23 LAB — NONINV COLON CA DNA+OCC BLD SCRN STL QL: POSITIVE

## 2025-05-26 DIAGNOSIS — R19.5 OTHER FECAL ABNORMALITIES: ICD-10-CM

## 2025-06-06 ENCOUNTER — LABORATORY RESULT (OUTPATIENT)
Age: 50
End: 2025-06-06

## 2025-06-06 ENCOUNTER — APPOINTMENT (OUTPATIENT)
Dept: FAMILY MEDICINE | Facility: CLINIC | Age: 50
End: 2025-06-06
Payer: COMMERCIAL

## 2025-06-06 PROCEDURE — 36415 COLL VENOUS BLD VENIPUNCTURE: CPT

## 2025-06-16 ENCOUNTER — APPOINTMENT (OUTPATIENT)
Dept: FAMILY MEDICINE | Facility: CLINIC | Age: 50
End: 2025-06-16
Payer: COMMERCIAL

## 2025-06-16 PROBLEM — R74.01 TRANSAMINITIS: Status: RESOLVED | Noted: 2025-05-20 | Resolved: 2025-06-16

## 2025-06-16 PROCEDURE — 99213 OFFICE O/P EST LOW 20 MIN: CPT | Mod: 95

## 2025-06-25 RX ORDER — TIRZEPATIDE 2.5 MG/.5ML
2.5 INJECTION, SOLUTION SUBCUTANEOUS
Qty: 1 | Refills: 0 | Status: ACTIVE | COMMUNITY
Start: 2025-06-16

## 2025-06-27 ENCOUNTER — NON-APPOINTMENT (OUTPATIENT)
Age: 50
End: 2025-06-27

## 2025-07-02 ENCOUNTER — APPOINTMENT (OUTPATIENT)
Dept: GASTROENTEROLOGY | Facility: CLINIC | Age: 50
End: 2025-07-02
Payer: COMMERCIAL

## 2025-07-02 VITALS
WEIGHT: 280 LBS | HEART RATE: 73 BPM | HEIGHT: 71 IN | BODY MASS INDEX: 39.2 KG/M2 | DIASTOLIC BLOOD PRESSURE: 74 MMHG | SYSTOLIC BLOOD PRESSURE: 130 MMHG | OXYGEN SATURATION: 98 % | RESPIRATION RATE: 14 BRPM

## 2025-07-02 PROCEDURE — G2211 COMPLEX E/M VISIT ADD ON: CPT | Mod: NC

## 2025-07-02 PROCEDURE — 99204 OFFICE O/P NEW MOD 45 MIN: CPT

## 2025-07-02 RX ORDER — SODIUM SULFATE, POTASSIUM SULFATE AND MAGNESIUM SULFATE 1.6; 3.13; 17.5 G/177ML; G/177ML; G/177ML
17.5-3.13-1.6 SOLUTION ORAL
Qty: 1 | Refills: 0 | Status: ACTIVE | COMMUNITY
Start: 2025-07-02 | End: 1900-01-01

## 2025-07-10 ENCOUNTER — NON-APPOINTMENT (OUTPATIENT)
Age: 50
End: 2025-07-10

## 2025-07-11 ENCOUNTER — OUTPATIENT (OUTPATIENT)
Dept: OUTPATIENT SERVICES | Facility: HOSPITAL | Age: 50
LOS: 1 days | End: 2025-07-11
Payer: COMMERCIAL

## 2025-07-11 ENCOUNTER — APPOINTMENT (OUTPATIENT)
Dept: ULTRASOUND IMAGING | Facility: CLINIC | Age: 50
End: 2025-07-11
Payer: COMMERCIAL

## 2025-07-11 ENCOUNTER — APPOINTMENT (OUTPATIENT)
Dept: FAMILY MEDICINE | Facility: CLINIC | Age: 50
End: 2025-07-11
Payer: COMMERCIAL

## 2025-07-11 DIAGNOSIS — Z98.1 ARTHRODESIS STATUS: Chronic | ICD-10-CM

## 2025-07-11 DIAGNOSIS — R10.32 LEFT LOWER QUADRANT PAIN: ICD-10-CM

## 2025-07-11 DIAGNOSIS — E04.1 NONTOXIC SINGLE THYROID NODULE: ICD-10-CM

## 2025-07-11 DIAGNOSIS — Z87.81 PERSONAL HISTORY OF (HEALED) TRAUMATIC FRACTURE: Chronic | ICD-10-CM

## 2025-07-11 DIAGNOSIS — Z00.8 ENCOUNTER FOR OTHER GENERAL EXAMINATION: ICD-10-CM

## 2025-07-11 PROCEDURE — 93975 VASCULAR STUDY: CPT | Mod: 26

## 2025-07-11 PROCEDURE — 98962 EDU&TRN PT SLF-MGMT NQHP 5-8: CPT

## 2025-07-11 PROCEDURE — 76870 US EXAM SCROTUM: CPT | Mod: 26

## 2025-07-11 PROCEDURE — 93975 VASCULAR STUDY: CPT

## 2025-07-11 PROCEDURE — 76870 US EXAM SCROTUM: CPT

## 2025-07-31 ENCOUNTER — APPOINTMENT (OUTPATIENT)
Dept: FAMILY MEDICINE | Facility: CLINIC | Age: 50
End: 2025-07-31
Payer: COMMERCIAL

## 2025-07-31 VITALS
HEART RATE: 77 BPM | WEIGHT: 268 LBS | OXYGEN SATURATION: 98 % | BODY MASS INDEX: 37.52 KG/M2 | HEIGHT: 71 IN | SYSTOLIC BLOOD PRESSURE: 118 MMHG | DIASTOLIC BLOOD PRESSURE: 70 MMHG

## 2025-07-31 DIAGNOSIS — E66.9 OBESITY, UNSPECIFIED: ICD-10-CM

## 2025-07-31 DIAGNOSIS — D72.820 LYMPHOCYTOSIS (SYMPTOMATIC): ICD-10-CM

## 2025-07-31 PROCEDURE — 99213 OFFICE O/P EST LOW 20 MIN: CPT

## 2025-08-15 ENCOUNTER — APPOINTMENT (OUTPATIENT)
Dept: PULMONOLOGY | Facility: CLINIC | Age: 50
End: 2025-08-15

## 2025-08-19 ENCOUNTER — NON-APPOINTMENT (OUTPATIENT)
Age: 50
End: 2025-08-19

## 2025-08-20 ENCOUNTER — APPOINTMENT (OUTPATIENT)
Dept: CARDIOLOGY | Facility: CLINIC | Age: 50
End: 2025-08-20
Payer: COMMERCIAL

## 2025-08-20 VITALS
SYSTOLIC BLOOD PRESSURE: 128 MMHG | BODY MASS INDEX: 35.84 KG/M2 | HEIGHT: 71 IN | HEART RATE: 76 BPM | OXYGEN SATURATION: 97 % | WEIGHT: 256 LBS | DIASTOLIC BLOOD PRESSURE: 76 MMHG

## 2025-08-20 DIAGNOSIS — E78.2 MIXED HYPERLIPIDEMIA: ICD-10-CM

## 2025-08-20 PROCEDURE — 93000 ELECTROCARDIOGRAM COMPLETE: CPT

## 2025-08-20 PROCEDURE — 99214 OFFICE O/P EST MOD 30 MIN: CPT | Mod: 25

## 2025-08-23 ENCOUNTER — NON-APPOINTMENT (OUTPATIENT)
Age: 50
End: 2025-08-23

## 2025-08-25 ENCOUNTER — APPOINTMENT (OUTPATIENT)
Dept: PULMONOLOGY | Facility: CLINIC | Age: 50
End: 2025-08-25
Payer: COMMERCIAL

## 2025-08-25 VITALS — WEIGHT: 258 LBS | HEIGHT: 69.5 IN | BODY MASS INDEX: 37.36 KG/M2

## 2025-08-25 VITALS
OXYGEN SATURATION: 98 % | HEART RATE: 74 BPM | RESPIRATION RATE: 14 BRPM | DIASTOLIC BLOOD PRESSURE: 72 MMHG | SYSTOLIC BLOOD PRESSURE: 120 MMHG

## 2025-08-25 DIAGNOSIS — Z86.79 PERSONAL HISTORY OF OTHER DISEASES OF THE CIRCULATORY SYSTEM: ICD-10-CM

## 2025-08-25 DIAGNOSIS — I50.810 RIGHT HEART FAILURE, UNSPECIFIED: ICD-10-CM

## 2025-08-25 DIAGNOSIS — Z86.69 PERSONAL HISTORY OF OTHER DISEASES OF THE NERVOUS SYSTEM AND SENSE ORGANS: ICD-10-CM

## 2025-08-25 DIAGNOSIS — Z01.818 ENCOUNTER FOR OTHER PREPROCEDURAL EXAMINATION: ICD-10-CM

## 2025-08-25 PROCEDURE — 99203 OFFICE O/P NEW LOW 30 MIN: CPT | Mod: 25

## 2025-08-25 PROCEDURE — 94010 BREATHING CAPACITY TEST: CPT

## 2025-09-05 ENCOUNTER — OUTPATIENT (OUTPATIENT)
Dept: OUTPATIENT SERVICES | Facility: HOSPITAL | Age: 50
LOS: 1 days | End: 2025-09-05
Payer: COMMERCIAL

## 2025-09-05 ENCOUNTER — APPOINTMENT (OUTPATIENT)
Dept: ORTHOPEDIC SURGERY | Facility: CLINIC | Age: 50
End: 2025-09-05
Payer: COMMERCIAL

## 2025-09-05 ENCOUNTER — APPOINTMENT (OUTPATIENT)
Dept: GASTROENTEROLOGY | Facility: HOSPITAL | Age: 50
End: 2025-09-05

## 2025-09-05 ENCOUNTER — TRANSCRIPTION ENCOUNTER (OUTPATIENT)
Age: 50
End: 2025-09-05

## 2025-09-05 VITALS
SYSTOLIC BLOOD PRESSURE: 137 MMHG | WEIGHT: 247 LBS | HEART RATE: 80 BPM | BODY MASS INDEX: 34.58 KG/M2 | DIASTOLIC BLOOD PRESSURE: 83 MMHG | HEIGHT: 71 IN

## 2025-09-05 VITALS
OXYGEN SATURATION: 100 % | DIASTOLIC BLOOD PRESSURE: 74 MMHG | RESPIRATION RATE: 13 BRPM | SYSTOLIC BLOOD PRESSURE: 123 MMHG | TEMPERATURE: 98 F | HEIGHT: 71 IN | HEART RATE: 78 BPM | WEIGHT: 248.9 LBS

## 2025-09-05 VITALS
OXYGEN SATURATION: 95 % | DIASTOLIC BLOOD PRESSURE: 91 MMHG | SYSTOLIC BLOOD PRESSURE: 122 MMHG | RESPIRATION RATE: 15 BRPM | HEART RATE: 83 BPM

## 2025-09-05 DIAGNOSIS — M51.9 UNSPECIFIED THORACIC, THORACOLUMBAR AND LUMBOSACRAL INTERVERTEBRAL DISC DISORDER: ICD-10-CM

## 2025-09-05 DIAGNOSIS — R19.5 OTHER FECAL ABNORMALITIES: ICD-10-CM

## 2025-09-05 DIAGNOSIS — Z87.81 PERSONAL HISTORY OF (HEALED) TRAUMATIC FRACTURE: Chronic | ICD-10-CM

## 2025-09-05 DIAGNOSIS — Z98.1 ARTHRODESIS STATUS: Chronic | ICD-10-CM

## 2025-09-05 DIAGNOSIS — G95.9 DISEASE OF SPINAL CORD, UNSPECIFIED: ICD-10-CM

## 2025-09-05 DIAGNOSIS — M54.12 DISEASE OF SPINAL CORD, UNSPECIFIED: ICD-10-CM

## 2025-09-05 DIAGNOSIS — M54.16 RADICULOPATHY, LUMBAR REGION: ICD-10-CM

## 2025-09-05 DIAGNOSIS — Z98.1 ARTHRODESIS STATUS: ICD-10-CM

## 2025-09-05 PROCEDURE — 72100 X-RAY EXAM L-S SPINE 2/3 VWS: CPT

## 2025-09-05 PROCEDURE — G0121: CPT

## 2025-09-05 PROCEDURE — 99214 OFFICE O/P EST MOD 30 MIN: CPT

## 2025-09-05 PROCEDURE — 45378 DIAGNOSTIC COLONOSCOPY: CPT

## 2025-09-05 RX ADMIN — Medication 75 MILLILITER(S): at 08:44

## 2025-09-08 RX ORDER — KETOROLAC TROMETHAMINE 10 MG/1
10 TABLET, FILM COATED ORAL EVERY 6 HOURS
Qty: 20 | Refills: 0 | Status: ACTIVE | COMMUNITY
Start: 2025-09-08 | End: 1900-01-01

## 2025-09-08 RX ORDER — METHYLPREDNISOLONE 4 MG/1
4 TABLET ORAL
Qty: 1 | Refills: 0 | Status: ACTIVE | COMMUNITY
Start: 2025-09-08 | End: 1900-01-01

## 2025-09-11 ENCOUNTER — APPOINTMENT (OUTPATIENT)
Dept: FAMILY MEDICINE | Facility: CLINIC | Age: 50
End: 2025-09-11
Payer: COMMERCIAL

## 2025-09-11 DIAGNOSIS — E66.9 OBESITY, UNSPECIFIED: ICD-10-CM

## 2025-09-11 DIAGNOSIS — E78.00 PURE HYPERCHOLESTEROLEMIA, UNSPECIFIED: ICD-10-CM

## 2025-09-11 PROCEDURE — 99213 OFFICE O/P EST LOW 20 MIN: CPT | Mod: 95

## 2025-09-20 ENCOUNTER — APPOINTMENT (OUTPATIENT)
Dept: MRI IMAGING | Facility: CLINIC | Age: 50
End: 2025-09-20
Payer: COMMERCIAL

## 2025-09-20 PROCEDURE — 72148 MRI LUMBAR SPINE W/O DYE: CPT | Mod: 26

## (undated) DEVICE — TUBING ALARIS PUMP MODULE NON-DEHP

## (undated) DEVICE — SOL BAG NS 0.9% 1000ML

## (undated) DEVICE — UNDERPAD LINEN SAVER 23 X 36"

## (undated) DEVICE — MASK PROCEDURE EARLOOP LVL 2 50/BX

## (undated) DEVICE — FORCEP RADIAL JAW 4 W NDL 2.4MM 2.8MM 240CM ORANGE DISP

## (undated) DEVICE — DRSG CURITY GAUZE SPONGE 4 X 4" 12-PLY NON-STERILE

## (undated) DEVICE — TUBING IV EXTENSION MACRO W CLAVE 7"

## (undated) DEVICE — SENSOR O2 FINGER ADULT

## (undated) DEVICE — SYR LUER SLIP TIP 50CC

## (undated) DEVICE — DENTURE CUP PINK

## (undated) DEVICE — SYR SLIP 10CC

## (undated) DEVICE — SYR IV FLUSH SALINE 10ML 30/TY

## (undated) DEVICE — DRSG 2X2

## (undated) DEVICE — CATH IV SAFE BC 22G X 1" (BLUE)

## (undated) DEVICE — SOL IRR BAG H2O 1000ML

## (undated) DEVICE — GOWN IMPERV XL

## (undated) DEVICE — PACK IV START WITH CHG